# Patient Record
Sex: FEMALE | Race: WHITE | NOT HISPANIC OR LATINO | Employment: OTHER | URBAN - METROPOLITAN AREA
[De-identification: names, ages, dates, MRNs, and addresses within clinical notes are randomized per-mention and may not be internally consistent; named-entity substitution may affect disease eponyms.]

---

## 2022-10-10 ENCOUNTER — OFFICE VISIT (OUTPATIENT)
Dept: ORTHOPEDIC SURGERY | Facility: CLINIC | Age: 87
End: 2022-10-10

## 2022-10-10 VITALS — TEMPERATURE: 98.7 F | WEIGHT: 140 LBS | HEIGHT: 69 IN | BODY MASS INDEX: 20.73 KG/M2

## 2022-10-10 DIAGNOSIS — S42.291A CLOSED FRACTURE OF HEAD OF RIGHT HUMERUS, INITIAL ENCOUNTER: ICD-10-CM
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
## 2022-10-10 PROCEDURE — 99203 OFFICE O/P NEW LOW 30 MIN: CPT | Performed by: NURSE PRACTITIONER

## 2022-10-10 PROCEDURE — 73060 X-RAY EXAM OF HUMERUS: CPT | Performed by: NURSE PRACTITIONER

## 2022-10-10 NOTE — PROGRESS NOTES
Patient Name: Agueda Krishna   YOB: 1925  Referring Primary Care Physician: Angel Story MD  BMI: Body mass index is 20.67 kg/m².    Chief Complaint:    Chief Complaint   Patient presents with   • Right Upper Arm - Pain        HPI: New pt here with son with a humeral neck fracture - fell at home on 10-5-22. Pt has expressive aphasia from a stroke and lives independently . Denies hitting head.   Pt ambulates with a cane   Past history of injury to arm before 11 year ago humeral head fracture and RHD     Agueda Krishna is a 89 y.o. female who presents today for evaluation of   Chief Complaint   Patient presents with   • Right Upper Arm - Pain         Subjective   Medications:   Home Medications:  Current Outpatient Medications on File Prior to Visit   Medication Sig   • carbidopa-levodopa (SINEMET)  MG per tablet    • cholestyramine (QUESTRAN) 4 g packet DISSOLVE AND TAKE 1 PACKET BY MOUTH THREE TIMES DAILY WITH MEALS FOR CONTROL OF DIARRHEA   • levothyroxine (SYNTHROID, LEVOTHROID) 75 MCG tablet    • metoprolol tartrate (LOPRESSOR) 25 MG tablet    • rosuvastatin (CRESTOR) 40 MG tablet    • vitamin D3 125 MCG (5000 UT) capsule capsule Take 5,000 Units by mouth Daily.     No current facility-administered medications on file prior to visit.     Current Medications:  Scheduled Meds:  Continuous Infusions:No current facility-administered medications for this visit.    PRN Meds:.    I have reviewed the patient's medical history in detail and updated the computerized patient record.  Review and summarization of old records includes:    Past Medical History:   Diagnosis Date   • History of stroke 03/31/2009   • Hyperlipidemia    • Hypertension    • Hypothyroid         Past Surgical History:   Procedure Laterality Date   • CATARACT EXTRACTION Bilateral    • HYSTERECTOMY     • REPLACEMENT TOTAL KNEE Left    • TOTAL HIP ARTHROPLASTY Right         Social History     Occupational History   • Not on file  "  Tobacco Use   • Smoking status: Never   • Smokeless tobacco: Never   Substance and Sexual Activity   • Alcohol use: Yes   • Drug use: Not on file   • Sexual activity: Not on file      Social History     Social History Narrative   • Not on file      History reviewed. No pertinent family history.    ROS: 14 point review of systems was performed and all other systems were reviewed and are negative except for documented findings in HPI and today's encounter.     Allergies:   Allergies   Allergen Reactions   • Nitrofurantoin Hives   • Sulfa Antibiotics Hives     Constitutional:  Denies fever, shaking or chills   Eyes:  Denies change in visual acuity   HENT:  Denies nasal congestion or sore throat   Respiratory:  Denies cough or shortness of breath   Cardiovascular:  Denies chest pain or severe LE edema   GI:  Denies abdominal pain, nausea, vomiting, bloody stools or diarrhea   Musculoskeletal:  Numbness, tingling, pain, or loss of motor function only as noted above in history of present illness.  : Denies painful urination or hematuria  Integument:  Denies rash, lesion or ulceration   Neurologic:  Denies headache or focal weakness  Endocrine:  Denies lymphadenopathy  Psych:  Denies confusion or change in mental status   Hem:  Denies active bleeding    OBJECTIVE:  Physical Exam: 89 y.o. female  Wt Readings from Last 3 Encounters:   10/10/22 63.5 kg (140 lb)     Ht Readings from Last 1 Encounters:   10/10/22 175.3 cm (69\")     Body mass index is 20.67 kg/m².  Vitals:    10/10/22 1329   Temp: 98.7 °F (37.1 °C)     Vital signs reviewed.     General Appearance:    Alert, cooperative, in no acute distress                  Eyes: conjunctiva clear  ENT: external ears and nose atraumatic  CV: no peripheral edema  Resp: normal respiratory effort  Skin: no rashes or wounds; normal turgor  Psych: mood and affect appropriate  Lymph: no nodes appreciated  Neuro: gross sensation intact  Vascular:  Palpable peripheral pulse in noted " extremity  Musculoskeletal Extremities: skin warm dry and intact, hematoma to humeral head with no deformity, elbow nttp, +pms and nvi, capp refill   Radiology: 2 views right humerus done for pain with no comparison films show humeral head fracture       Assessment:     ICD-10-CM ICD-9-CM   1. Accidental fall, initial encounter  W19.XXXA E888.9   2. Closed fracture of head of right humerus, initial encounter  S42.291A 812.09        Procedures  Continue sling and rest, ice, elevate and     MDM/Plan:   The diagnosis(es), natural history, pathophysiology and treatment for diagnosis(es) were discussed. Opportunity given and questions answered.  Biomechanics of pertinent body areas discussed.  When appropriate, the use of ambulatory aids discussed.  EXERCISES:  Advice on benefits of, and types of regular/moderate exercise pertaining to orthopedic diagnosis(es).  MEDICATIONS:  The risks, benefits, warnings,side effects and alternatives of medications discussed.  Inflammation/pain control; with cold, heat, elevation and/or liniments discussed as appropriate  MEDICAL RECORDS reviewed from other provider(s) for past and current medical history pertinent to this complaint.      10/10/2022    Much of this encounter note is an electronic transcription/translation of spoken language to printed text. The electronic translation of spoken language may permit erroneous, or at times, nonsensical words or phrases to be inadvertently transcribed; Although I have reviewed the note for such errors, some may still exist

## 2022-10-24 ENCOUNTER — OFFICE VISIT (OUTPATIENT)
Dept: ORTHOPEDIC SURGERY | Facility: CLINIC | Age: 87
End: 2022-10-24

## 2022-10-24 VITALS — TEMPERATURE: 97.6 F | HEIGHT: 68 IN | WEIGHT: 165 LBS | BODY MASS INDEX: 25.01 KG/M2

## 2022-10-24 DIAGNOSIS — S42.201A CLOSED FRACTURE OF PROXIMAL END OF RIGHT HUMERUS, UNSPECIFIED FRACTURE MORPHOLOGY, INITIAL ENCOUNTER: Primary | ICD-10-CM

## 2022-10-24 DIAGNOSIS — Z09 FRACTURE FOLLOW-UP: ICD-10-CM

## 2022-10-24 PROCEDURE — 99214 OFFICE O/P EST MOD 30 MIN: CPT | Performed by: ORTHOPAEDIC SURGERY

## 2022-10-24 PROCEDURE — 73060 X-RAY EXAM OF HUMERUS: CPT | Performed by: ORTHOPAEDIC SURGERY

## 2022-10-24 RX ORDER — LEVOFLOXACIN 500 MG/1
TABLET, FILM COATED ORAL
COMMUNITY
Start: 2022-10-20

## 2022-10-24 NOTE — PROGRESS NOTES
Agueda Krishna : 10/20/1925 MRN: [id number] DATE: 10/24/2022    CC: Closed treatment of right proximal humerus fracture    HPI: Patient presents with her son that she had history of stroke and is nonverbal.  Right-hand-dominant.  Patient apparently had injury about 3 weeks ago and was seen several days later in ER placed in a sling.  Comes out occasionally just to move the elbow and wrist.    Vitals:    10/24/22 1338   Temp: 97.6 °F (36.4 °C)       Exam:  Skin intact and benign.  Arm and forearm are both soft.  Shoulder moves fluidly with pendulum exercises.  Good motor and sensory function distally.  Palpable radial pulse with good cap refill.      Imaginv xrays including AP and scapular Y are ordered and reviewed by me to evaluate alignment and for comparison purposes.  Evidence of proximal humerus fracture alignment satisfactory.    Impression:   2.5 weeks s/p closed treatment of right proximal humerus fracture    Plan:    1.  Continue use of sling.  2.  Begin working on pendulum exercises.  Okay to move elbow and wrist avoid stiffness do all this 3-5 times a day.  Ice as needed.  Tylenol okay for pain avoid anti-inflammatories at this time.  Recommend calcium and vitamin D for bone healing and health.  3.  Follow-up in 3 weeks with repeat 2v xrays.  4.  Counseled the patient about appropriate activity modifications and restrictions, including no driving.    Ronald Florez MD

## 2022-11-17 ENCOUNTER — OFFICE VISIT (OUTPATIENT)
Dept: ORTHOPEDIC SURGERY | Facility: CLINIC | Age: 87
End: 2022-11-17

## 2022-11-17 VITALS — WEIGHT: 165 LBS | HEIGHT: 68 IN | TEMPERATURE: 97.6 F | BODY MASS INDEX: 25.01 KG/M2

## 2022-11-17 DIAGNOSIS — R52 PAIN: ICD-10-CM

## 2022-11-17 DIAGNOSIS — S42.201D CLOSED FRACTURE OF PROXIMAL END OF RIGHT HUMERUS WITH ROUTINE HEALING, UNSPECIFIED FRACTURE MORPHOLOGY, SUBSEQUENT ENCOUNTER: Primary | ICD-10-CM

## 2022-11-17 PROCEDURE — 73030 X-RAY EXAM OF SHOULDER: CPT | Performed by: ORTHOPAEDIC SURGERY

## 2022-11-17 PROCEDURE — 99213 OFFICE O/P EST LOW 20 MIN: CPT | Performed by: ORTHOPAEDIC SURGERY

## 2022-11-17 NOTE — PROGRESS NOTES
"Agueda Krishna : 10/20/1925 MRN: [id number] DATE: 2022    CC: Closed treatment of right proximal humerus fracture    HPI: Pt. returns to clinic today for follow-up.  Reports that the pain continues to improve.  She is here with her son.  Again she is nonverbal at her baseline.  Denies any new concerns or issues.    Vitals:    22 1131   Temp: 97.6 °F (36.4 °C)   TempSrc: Temporal   Weight: 74.8 kg (165 lb)   Height: 172.7 cm (67.99\")       Exam:  Contour of shoulder appears normal.  Skin intact and benign.  Arm and forearm soft.  Shoulder motion is limited but well tolerated.  Good motor and sensory function distally.  Palpable pulse with good cap refill.      Imaging  AP and scapular Y views of the shoulder are ordered and reviewed by me to evaluate alignment and for comparison purposes.  No new or concerning findings noted.  Fracture appears to be healing.  No change in alignment.    Impression:   6 weeks s/p closed treatment of right proximal humerus fracture    Plan:    1.  Continue to work on progressive range of motion.  PT ordered.  Wean out of sling over next few weeks.  2.  F/u in 6 weeks with repeat 2v xrays.  3.  Counseled the patient about appropriate activity modifications and restrictions, including no heavy lifting, pushing or pulling  4.  Follow-up in 6 weeks.    Ronald Florez MD      "

## 2022-11-28 ENCOUNTER — TELEPHONE (OUTPATIENT)
Dept: ORTHOPEDIC SURGERY | Facility: CLINIC | Age: 87
End: 2022-11-28

## 2022-11-28 NOTE — TELEPHONE ENCOUNTER
Provider: KAMILA    Caller: JANNET FORMAN     Relationship to Patient: POA    Phone Number: [phone]    Reason for Call: TO DISCUSS REHAB FACILITY AND IF ARCELIA FORMAN  NEEDS ANOTHER APPOINTMENT WITH DR TREVIÑO    When was the patient last seen: 11/17/22

## 2023-06-15 ENCOUNTER — TRANSCRIBE ORDERS (OUTPATIENT)
Dept: ADMINISTRATIVE | Facility: HOSPITAL | Age: 88
End: 2023-06-15
Payer: MEDICARE

## 2023-06-15 DIAGNOSIS — N18.31 CHRONIC KIDNEY DISEASE (CKD) STAGE G3A/A1, MODERATELY DECREASED GLOMERULAR FILTRATION RATE (GFR) BETWEEN 45-59 ML/MIN/1.73 SQUARE METER AND ALBUMINURIA CREATININE RATIO LESS THAN 30 MG/G (CMS/H*: Primary | ICD-10-CM

## 2023-09-18 ENCOUNTER — HOSPITAL ENCOUNTER (OUTPATIENT)
Dept: ULTRASOUND IMAGING | Facility: HOSPITAL | Age: 88
Discharge: HOME | End: 2023-09-18
Admitting: INTERNAL MEDICINE
Payer: MEDICARE

## 2023-09-18 DIAGNOSIS — N18.31 CHRONIC KIDNEY DISEASE (CKD) STAGE G3A/A1, MODERATELY DECREASED GLOMERULAR FILTRATION RATE (GFR) BETWEEN 45-59 ML/MIN/1.73 SQUARE METER AND ALBUMINURIA CREATININE RATIO LESS THAN 30 MG/G (CMS/H*: ICD-10-CM

## 2023-09-18 PROCEDURE — 76775 US EXAM ABDO BACK WALL LIM: CPT

## 2025-07-01 ENCOUNTER — APPOINTMENT (OUTPATIENT)
Dept: GENERAL RADIOLOGY | Facility: HOSPITAL | Age: OVER 89
DRG: 853 | End: 2025-07-01
Payer: MEDICARE

## 2025-07-01 ENCOUNTER — ANESTHESIA (OUTPATIENT)
Dept: PERIOP | Facility: HOSPITAL | Age: OVER 89
End: 2025-07-01
Payer: MEDICARE

## 2025-07-01 ENCOUNTER — HOSPITAL ENCOUNTER (INPATIENT)
Facility: HOSPITAL | Age: OVER 89
LOS: 9 days | Discharge: NURSING, REHAB OR LONG-TERM CARE | DRG: 853 | End: 2025-07-10
Attending: EMERGENCY MEDICINE | Admitting: HOSPITALIST
Payer: MEDICARE

## 2025-07-01 ENCOUNTER — APPOINTMENT (OUTPATIENT)
Dept: CT IMAGING | Facility: HOSPITAL | Age: OVER 89
DRG: 853 | End: 2025-07-01
Payer: MEDICARE

## 2025-07-01 ENCOUNTER — ANESTHESIA EVENT (OUTPATIENT)
Dept: PERIOP | Facility: HOSPITAL | Age: OVER 89
End: 2025-07-01
Payer: MEDICARE

## 2025-07-01 DIAGNOSIS — D69.6 THROMBOCYTOPENIA: ICD-10-CM

## 2025-07-01 DIAGNOSIS — R11.10 VOMITING AND DIARRHEA: ICD-10-CM

## 2025-07-01 DIAGNOSIS — D72.829 LEUKOCYTOSIS, UNSPECIFIED TYPE: ICD-10-CM

## 2025-07-01 DIAGNOSIS — D64.9 ANEMIA, UNSPECIFIED TYPE: ICD-10-CM

## 2025-07-01 DIAGNOSIS — N20.1 LEFT URETERAL CALCULUS: Primary | ICD-10-CM

## 2025-07-01 DIAGNOSIS — E87.20 LACTIC ACIDOSIS: ICD-10-CM

## 2025-07-01 DIAGNOSIS — N17.9 ACUTE KIDNEY INJURY: ICD-10-CM

## 2025-07-01 DIAGNOSIS — R57.1 HYPOVOLEMIC SHOCK: ICD-10-CM

## 2025-07-01 DIAGNOSIS — R19.7 VOMITING AND DIARRHEA: ICD-10-CM

## 2025-07-01 LAB
ABO GROUP BLD: NORMAL
ALBUMIN SERPL-MCNC: 2.7 G/DL (ref 3.5–5.2)
ALBUMIN/GLOB SERPL: 1.1 G/DL
ALP SERPL-CCNC: 113 U/L (ref 39–117)
ALT SERPL W P-5'-P-CCNC: 44 U/L (ref 1–33)
ANION GAP SERPL CALCULATED.3IONS-SCNC: 13 MMOL/L (ref 5–15)
ANISOCYTOSIS BLD QL: ABNORMAL
ANTI-FYA: NORMAL
APTT PPP: 35.5 SECONDS (ref 22.7–35.4)
AST SERPL-CCNC: 99 U/L (ref 1–32)
BACTERIA UR QL AUTO: ABNORMAL /HPF
BASOPHILS # BLD MANUAL: 0 10*3/MM3 (ref 0–0.2)
BASOPHILS NFR BLD MANUAL: 0 % (ref 0–1.5)
BILIRUB SERPL-MCNC: 0.6 MG/DL (ref 0–1.2)
BILIRUB UR QL STRIP: NEGATIVE
BLD GP AB SCN SERPL QL: POSITIVE
BUN SERPL-MCNC: 42 MG/DL (ref 8–23)
BUN/CREAT SERPL: 14.6 (ref 7–25)
CALCIUM SPEC-SCNC: 8.2 MG/DL (ref 8.2–9.6)
CHLORIDE SERPL-SCNC: 111 MMOL/L (ref 98–107)
CLARITY UR: ABNORMAL
CO2 SERPL-SCNC: 17 MMOL/L (ref 22–29)
COLOR UR: ABNORMAL
CREAT SERPL-MCNC: 2.88 MG/DL (ref 0.57–1)
D-LACTATE SERPL-SCNC: 2.2 MMOL/L (ref 0.5–2)
D-LACTATE SERPL-SCNC: 4.4 MMOL/L (ref 0.5–2)
D-LACTATE SERPL-SCNC: 5.5 MMOL/L (ref 0.5–2)
DEPRECATED RDW RBC AUTO: 48 FL (ref 37–54)
DUFFY A ANTIGEN: NEGATIVE
EGFRCR SERPLBLD CKD-EPI 2021: 14.2 ML/MIN/1.73
ELLIPTOCYTES BLD QL SMEAR: ABNORMAL
EOSINOPHIL # BLD MANUAL: 0 10*3/MM3 (ref 0–0.4)
EOSINOPHIL NFR BLD MANUAL: 0 % (ref 0.3–6.2)
ERYTHROCYTE [DISTWIDTH] IN BLOOD BY AUTOMATED COUNT: 14.4 % (ref 12.3–15.4)
GLOBULIN UR ELPH-MCNC: 2.5 GM/DL
GLUCOSE SERPL-MCNC: 60 MG/DL (ref 65–99)
GLUCOSE UR STRIP-MCNC: NEGATIVE MG/DL
GRAN CASTS URNS QL MICRO: PRESENT /LPF
HCT VFR BLD AUTO: 30.4 % (ref 34–46.6)
HGB BLD-MCNC: 9.2 G/DL (ref 12–15.9)
HGB UR QL STRIP.AUTO: ABNORMAL
HYALINE CASTS UR QL AUTO: ABNORMAL /LPF
INR PPP: 1.72 (ref 0.9–1.1)
KETONES UR QL STRIP: NEGATIVE
LEUKOCYTE ESTERASE UR QL STRIP.AUTO: ABNORMAL
LIPASE SERPL-CCNC: 16 U/L (ref 13–60)
LYMPHOCYTES # BLD MANUAL: 0.23 10*3/MM3 (ref 0.7–3.1)
LYMPHOCYTES NFR BLD MANUAL: 4 % (ref 5–12)
MCH RBC QN AUTO: 27.8 PG (ref 26.6–33)
MCHC RBC AUTO-ENTMCNC: 30.3 G/DL (ref 31.5–35.7)
MCV RBC AUTO: 91.8 FL (ref 79–97)
MONOCYTES # BLD: 0.94 10*3/MM3 (ref 0.1–0.9)
NEUTROPHILS # BLD AUTO: 22.24 10*3/MM3 (ref 1.7–7)
NEUTROPHILS NFR BLD MANUAL: 95 % (ref 42.7–76)
NITRITE UR QL STRIP: POSITIVE
PH UR STRIP.AUTO: <=5 [PH] (ref 5–8)
PLAT MORPH BLD: NORMAL
PLATELET # BLD AUTO: 22 10*3/MM3 (ref 140–450)
PLATELET # BLD AUTO: 36 10*3/MM3 (ref 140–450)
PLATELETS.RETICULATED NFR BLD AUTO: 13.5 % (ref 0.9–6.5)
POIKILOCYTOSIS BLD QL SMEAR: ABNORMAL
POTASSIUM SERPL-SCNC: 4.9 MMOL/L (ref 3.5–5.2)
PROCALCITONIN SERPL-MCNC: 95.3 NG/ML (ref 0–0.25)
PROT SERPL-MCNC: 5.2 G/DL (ref 6–8.5)
PROT UR QL STRIP: ABNORMAL
PROTHROMBIN TIME: 20.2 SECONDS (ref 11.7–14.2)
RBC # BLD AUTO: 3.31 10*6/MM3 (ref 3.77–5.28)
RBC # UR STRIP: ABNORMAL /HPF
REF LAB TEST METHOD: ABNORMAL
RETICS # AUTO: 0.1 10*6/MM3 (ref 0.02–0.13)
RETICS/RBC NFR AUTO: 2.37 % (ref 0.7–1.9)
RH BLD: POSITIVE
SODIUM SERPL-SCNC: 141 MMOL/L (ref 136–145)
SP GR UR STRIP: 1.01 (ref 1–1.03)
SQUAMOUS #/AREA URNS HPF: ABNORMAL /HPF
T&S EXPIRATION DATE: NORMAL
UROBILINOGEN UR QL STRIP: ABNORMAL
VARIANT LYMPHS NFR BLD MANUAL: 1 % (ref 19.6–45.3)
WBC # UR STRIP: ABNORMAL /HPF
WBC CLUMPS # UR AUTO: PRESENT /HPF
WBC MORPH BLD: NORMAL
WBC NRBC COR # BLD AUTO: 23.41 10*3/MM3 (ref 3.4–10.8)

## 2025-07-01 PROCEDURE — 86905 BLOOD TYPING RBC ANTIGENS: CPT | Performed by: EMERGENCY MEDICINE

## 2025-07-01 PROCEDURE — P9612 CATHETERIZE FOR URINE SPEC: HCPCS

## 2025-07-01 PROCEDURE — C1769 GUIDE WIRE: HCPCS | Performed by: STUDENT IN AN ORGANIZED HEALTH CARE EDUCATION/TRAINING PROGRAM

## 2025-07-01 PROCEDURE — C2617 STENT, NON-COR, TEM W/O DEL: HCPCS | Performed by: STUDENT IN AN ORGANIZED HEALTH CARE EDUCATION/TRAINING PROGRAM

## 2025-07-01 PROCEDURE — 86902 BLOOD TYPE ANTIGEN DONOR EA: CPT

## 2025-07-01 PROCEDURE — 25010000002 LIDOCAINE 2% SOLUTION: Performed by: ANESTHESIOLOGY

## 2025-07-01 PROCEDURE — 25810000003 LACTATED RINGERS SOLUTION: Performed by: EMERGENCY MEDICINE

## 2025-07-01 PROCEDURE — 80053 COMPREHEN METABOLIC PANEL: CPT | Performed by: EMERGENCY MEDICINE

## 2025-07-01 PROCEDURE — 25010000002 ONDANSETRON PER 1 MG: Performed by: ANESTHESIOLOGY

## 2025-07-01 PROCEDURE — 36415 COLL VENOUS BLD VENIPUNCTURE: CPT | Performed by: EMERGENCY MEDICINE

## 2025-07-01 PROCEDURE — 83605 ASSAY OF LACTIC ACID: CPT | Performed by: EMERGENCY MEDICINE

## 2025-07-01 PROCEDURE — 85025 COMPLETE CBC W/AUTO DIFF WBC: CPT | Performed by: EMERGENCY MEDICINE

## 2025-07-01 PROCEDURE — 85007 BL SMEAR W/DIFF WBC COUNT: CPT | Performed by: EMERGENCY MEDICINE

## 2025-07-01 PROCEDURE — 0T778DZ DILATION OF LEFT URETER WITH INTRALUMINAL DEVICE, VIA NATURAL OR ARTIFICIAL OPENING ENDOSCOPIC: ICD-10-PCS | Performed by: STUDENT IN AN ORGANIZED HEALTH CARE EDUCATION/TRAINING PROGRAM

## 2025-07-01 PROCEDURE — 25810000003 LACTATED RINGERS PER 1000 ML: Performed by: EMERGENCY MEDICINE

## 2025-07-01 PROCEDURE — 84145 PROCALCITONIN (PCT): CPT | Performed by: HOSPITALIST

## 2025-07-01 PROCEDURE — 25010000002 DEXAMETHASONE SODIUM PHOSPHATE 20 MG/5ML SOLUTION: Performed by: ANESTHESIOLOGY

## 2025-07-01 PROCEDURE — 85055 RETICULATED PLATELET ASSAY: CPT | Performed by: HOSPITALIST

## 2025-07-01 PROCEDURE — 99291 CRITICAL CARE FIRST HOUR: CPT

## 2025-07-01 PROCEDURE — 83690 ASSAY OF LIPASE: CPT | Performed by: EMERGENCY MEDICINE

## 2025-07-01 PROCEDURE — 85045 AUTOMATED RETICULOCYTE COUNT: CPT | Performed by: HOSPITALIST

## 2025-07-01 PROCEDURE — 25010000002 FENTANYL CITRATE (PF) 50 MCG/ML SOLUTION: Performed by: ANESTHESIOLOGY

## 2025-07-01 PROCEDURE — 86900 BLOOD TYPING SEROLOGIC ABO: CPT | Performed by: EMERGENCY MEDICINE

## 2025-07-01 PROCEDURE — 25010000002 FAMOTIDINE 10 MG/ML SOLUTION: Performed by: ANESTHESIOLOGY

## 2025-07-01 PROCEDURE — 25510000001 IOPAMIDOL 61 % SOLUTION: Performed by: STUDENT IN AN ORGANIZED HEALTH CARE EDUCATION/TRAINING PROGRAM

## 2025-07-01 PROCEDURE — 25010000002 VASOPRESSIN 20 UNIT/ML SOLUTION: Performed by: ANESTHESIOLOGY

## 2025-07-01 PROCEDURE — 86920 COMPATIBILITY TEST SPIN: CPT

## 2025-07-01 PROCEDURE — 86922 COMPATIBILITY TEST ANTIGLOB: CPT

## 2025-07-01 PROCEDURE — 87154 CUL TYP ID BLD PTHGN 6+ TRGT: CPT | Performed by: EMERGENCY MEDICINE

## 2025-07-01 PROCEDURE — 74420 UROGRAPHY RTRGR +-KUB: CPT

## 2025-07-01 PROCEDURE — 85610 PROTHROMBIN TIME: CPT | Performed by: EMERGENCY MEDICINE

## 2025-07-01 PROCEDURE — 83540 ASSAY OF IRON: CPT | Performed by: INTERNAL MEDICINE

## 2025-07-01 PROCEDURE — 81001 URINALYSIS AUTO W/SCOPE: CPT | Performed by: EMERGENCY MEDICINE

## 2025-07-01 PROCEDURE — 82728 ASSAY OF FERRITIN: CPT | Performed by: INTERNAL MEDICINE

## 2025-07-01 PROCEDURE — 25010000002 PIPERACILLIN SOD-TAZOBACTAM PER 1 G: Performed by: EMERGENCY MEDICINE

## 2025-07-01 PROCEDURE — C1758 CATHETER, URETERAL: HCPCS | Performed by: STUDENT IN AN ORGANIZED HEALTH CARE EDUCATION/TRAINING PROGRAM

## 2025-07-01 PROCEDURE — BT1F1ZZ FLUOROSCOPY OF LEFT KIDNEY, URETER AND BLADDER USING LOW OSMOLAR CONTRAST: ICD-10-PCS | Performed by: STUDENT IN AN ORGANIZED HEALTH CARE EDUCATION/TRAINING PROGRAM

## 2025-07-01 PROCEDURE — 25010000002 PROPOFOL 10 MG/ML EMULSION: Performed by: ANESTHESIOLOGY

## 2025-07-01 PROCEDURE — 86870 RBC ANTIBODY IDENTIFICATION: CPT | Performed by: EMERGENCY MEDICINE

## 2025-07-01 PROCEDURE — 86901 BLOOD TYPING SEROLOGIC RH(D): CPT | Performed by: EMERGENCY MEDICINE

## 2025-07-01 PROCEDURE — 87186 SC STD MICRODIL/AGAR DIL: CPT | Performed by: EMERGENCY MEDICINE

## 2025-07-01 PROCEDURE — 25810000003 SODIUM CHLORIDE 0.9 % SOLUTION: Performed by: ANESTHESIOLOGY

## 2025-07-01 PROCEDURE — 85730 THROMBOPLASTIN TIME PARTIAL: CPT | Performed by: EMERGENCY MEDICINE

## 2025-07-01 PROCEDURE — 74176 CT ABD & PELVIS W/O CONTRAST: CPT

## 2025-07-01 PROCEDURE — 84466 ASSAY OF TRANSFERRIN: CPT | Performed by: INTERNAL MEDICINE

## 2025-07-01 PROCEDURE — 87040 BLOOD CULTURE FOR BACTERIA: CPT | Performed by: EMERGENCY MEDICINE

## 2025-07-01 PROCEDURE — 86850 RBC ANTIBODY SCREEN: CPT | Performed by: EMERGENCY MEDICINE

## 2025-07-01 PROCEDURE — 25010000002 DROPERIDOL PER 5 MG: Performed by: ANESTHESIOLOGY

## 2025-07-01 DEVICE — URETERAL STENT
Type: IMPLANTABLE DEVICE | Site: URETER | Status: FUNCTIONAL
Brand: CONTOUR™

## 2025-07-01 RX ORDER — DEXAMETHASONE SODIUM PHOSPHATE 4 MG/ML
INJECTION, SOLUTION INTRA-ARTICULAR; INTRALESIONAL; INTRAMUSCULAR; INTRAVENOUS; SOFT TISSUE AS NEEDED
Status: DISCONTINUED | OUTPATIENT
Start: 2025-07-01 | End: 2025-07-01 | Stop reason: SURG

## 2025-07-01 RX ORDER — NITROGLYCERIN 0.4 MG/1
0.4 TABLET SUBLINGUAL
Status: DISCONTINUED | OUTPATIENT
Start: 2025-07-01 | End: 2025-07-10 | Stop reason: HOSPADM

## 2025-07-01 RX ORDER — SODIUM CHLORIDE 0.9 % (FLUSH) 0.9 %
10 SYRINGE (ML) INJECTION AS NEEDED
Status: DISCONTINUED | OUTPATIENT
Start: 2025-07-01 | End: 2025-07-10 | Stop reason: HOSPADM

## 2025-07-01 RX ORDER — SODIUM CHLORIDE 9 MG/ML
9 INJECTION, SOLUTION INTRAVENOUS ONCE
Status: COMPLETED | OUTPATIENT
Start: 2025-07-01 | End: 2025-07-01

## 2025-07-01 RX ORDER — ACETAMINOPHEN 325 MG/1
650 TABLET ORAL EVERY 4 HOURS PRN
Status: DISCONTINUED | OUTPATIENT
Start: 2025-07-01 | End: 2025-07-10 | Stop reason: HOSPADM

## 2025-07-01 RX ORDER — SODIUM CHLORIDE 0.9 % (FLUSH) 0.9 %
10 SYRINGE (ML) INJECTION EVERY 12 HOURS SCHEDULED
Status: DISCONTINUED | OUTPATIENT
Start: 2025-07-01 | End: 2025-07-10 | Stop reason: HOSPADM

## 2025-07-01 RX ORDER — ATROPINE SULFATE 0.4 MG/ML
0.4 INJECTION, SOLUTION INTRAMUSCULAR; INTRAVENOUS; SUBCUTANEOUS ONCE AS NEEDED
Status: DISCONTINUED | OUTPATIENT
Start: 2025-07-01 | End: 2025-07-01 | Stop reason: HOSPADM

## 2025-07-01 RX ORDER — ACETAMINOPHEN 650 MG/1
650 SUPPOSITORY RECTAL EVERY 4 HOURS PRN
Status: DISCONTINUED | OUTPATIENT
Start: 2025-07-01 | End: 2025-07-10 | Stop reason: HOSPADM

## 2025-07-01 RX ORDER — PROPOFOL 10 MG/ML
VIAL (ML) INTRAVENOUS AS NEEDED
Status: DISCONTINUED | OUTPATIENT
Start: 2025-07-01 | End: 2025-07-01 | Stop reason: SURG

## 2025-07-01 RX ORDER — SODIUM CHLORIDE 9 MG/ML
40 INJECTION, SOLUTION INTRAVENOUS AS NEEDED
Status: DISCONTINUED | OUTPATIENT
Start: 2025-07-01 | End: 2025-07-10 | Stop reason: HOSPADM

## 2025-07-01 RX ORDER — SODIUM CHLORIDE 9 MG/ML
125 INJECTION, SOLUTION INTRAVENOUS CONTINUOUS
Status: DISCONTINUED | OUTPATIENT
Start: 2025-07-01 | End: 2025-07-01

## 2025-07-01 RX ORDER — EPHEDRINE SULFATE 50 MG/ML
5 INJECTION, SOLUTION INTRAVENOUS ONCE AS NEEDED
Status: DISCONTINUED | OUTPATIENT
Start: 2025-07-01 | End: 2025-07-01 | Stop reason: HOSPADM

## 2025-07-01 RX ORDER — IPRATROPIUM BROMIDE AND ALBUTEROL SULFATE 2.5; .5 MG/3ML; MG/3ML
3 SOLUTION RESPIRATORY (INHALATION) ONCE AS NEEDED
Status: DISCONTINUED | OUTPATIENT
Start: 2025-07-01 | End: 2025-07-01 | Stop reason: HOSPADM

## 2025-07-01 RX ORDER — SODIUM CHLORIDE, SODIUM LACTATE, POTASSIUM CHLORIDE, CALCIUM CHLORIDE 600; 310; 30; 20 MG/100ML; MG/100ML; MG/100ML; MG/100ML
9 INJECTION, SOLUTION INTRAVENOUS CONTINUOUS
Status: ACTIVE | OUTPATIENT
Start: 2025-07-01 | End: 2025-07-02

## 2025-07-01 RX ORDER — ACETAMINOPHEN 160 MG/5ML
650 SOLUTION ORAL EVERY 4 HOURS PRN
Status: DISCONTINUED | OUTPATIENT
Start: 2025-07-01 | End: 2025-07-10 | Stop reason: HOSPADM

## 2025-07-01 RX ORDER — SODIUM CHLORIDE, SODIUM LACTATE, POTASSIUM CHLORIDE, CALCIUM CHLORIDE 600; 310; 30; 20 MG/100ML; MG/100ML; MG/100ML; MG/100ML
125 INJECTION, SOLUTION INTRAVENOUS CONTINUOUS
Status: ACTIVE | OUTPATIENT
Start: 2025-07-01 | End: 2025-07-01

## 2025-07-01 RX ORDER — CHOLECALCIFEROL (VITAMIN D3) 25 MCG
5000 TABLET ORAL DAILY
Status: DISCONTINUED | OUTPATIENT
Start: 2025-07-01 | End: 2025-07-10 | Stop reason: HOSPADM

## 2025-07-01 RX ORDER — SODIUM CHLORIDE 0.9 % (FLUSH) 0.9 %
3 SYRINGE (ML) INJECTION EVERY 12 HOURS SCHEDULED
Status: DISCONTINUED | OUTPATIENT
Start: 2025-07-01 | End: 2025-07-01 | Stop reason: HOSPADM

## 2025-07-01 RX ORDER — LEVOTHYROXINE SODIUM 75 UG/1
75 TABLET ORAL
Status: DISCONTINUED | OUTPATIENT
Start: 2025-07-02 | End: 2025-07-10 | Stop reason: HOSPADM

## 2025-07-01 RX ORDER — SOLIFENACIN SUCCINATE 5 MG/1
5 TABLET, FILM COATED ORAL DAILY
COMMUNITY
End: 2025-07-10 | Stop reason: HOSPADM

## 2025-07-01 RX ORDER — BUPIVACAINE HCL/0.9 % NACL/PF 0.125 %
PLASTIC BAG, INJECTION (ML) EPIDURAL AS NEEDED
Status: DISCONTINUED | OUTPATIENT
Start: 2025-07-01 | End: 2025-07-01 | Stop reason: SURG

## 2025-07-01 RX ORDER — CARBIDOPA AND LEVODOPA 25; 100 MG/1; MG/1
1 TABLET ORAL 2 TIMES DAILY
Status: DISCONTINUED | OUTPATIENT
Start: 2025-07-01 | End: 2025-07-10 | Stop reason: HOSPADM

## 2025-07-01 RX ORDER — INDOMETHACIN 25 MG/1
CAPSULE ORAL AS NEEDED
Status: DISCONTINUED | OUTPATIENT
Start: 2025-07-01 | End: 2025-07-01 | Stop reason: SURG

## 2025-07-01 RX ORDER — ONDANSETRON 4 MG/1
4 TABLET, ORALLY DISINTEGRATING ORAL EVERY 6 HOURS PRN
Status: DISCONTINUED | OUTPATIENT
Start: 2025-07-01 | End: 2025-07-10 | Stop reason: HOSPADM

## 2025-07-01 RX ORDER — FAMOTIDINE 10 MG/ML
20 INJECTION, SOLUTION INTRAVENOUS ONCE
Status: COMPLETED | OUTPATIENT
Start: 2025-07-01 | End: 2025-07-01

## 2025-07-01 RX ORDER — ONDANSETRON 2 MG/ML
INJECTION INTRAMUSCULAR; INTRAVENOUS AS NEEDED
Status: DISCONTINUED | OUTPATIENT
Start: 2025-07-01 | End: 2025-07-01 | Stop reason: SURG

## 2025-07-01 RX ORDER — IOPAMIDOL 612 MG/ML
INJECTION, SOLUTION INTRAVASCULAR AS NEEDED
Status: DISCONTINUED | OUTPATIENT
Start: 2025-07-01 | End: 2025-07-01 | Stop reason: HOSPADM

## 2025-07-01 RX ORDER — LIDOCAINE HYDROCHLORIDE 20 MG/ML
INJECTION, SOLUTION INFILTRATION; PERINEURAL AS NEEDED
Status: DISCONTINUED | OUTPATIENT
Start: 2025-07-01 | End: 2025-07-01 | Stop reason: SURG

## 2025-07-01 RX ORDER — VASOPRESSIN 20 [USP'U]/ML
INJECTION, SOLUTION INTRAVENOUS AS NEEDED
Status: DISCONTINUED | OUTPATIENT
Start: 2025-07-01 | End: 2025-07-01 | Stop reason: SURG

## 2025-07-01 RX ORDER — ONDANSETRON 2 MG/ML
4 INJECTION INTRAMUSCULAR; INTRAVENOUS ONCE AS NEEDED
Status: DISCONTINUED | OUTPATIENT
Start: 2025-07-01 | End: 2025-07-01 | Stop reason: HOSPADM

## 2025-07-01 RX ORDER — DROPERIDOL 2.5 MG/ML
0.62 INJECTION, SOLUTION INTRAMUSCULAR; INTRAVENOUS
Status: DISCONTINUED | OUTPATIENT
Start: 2025-07-01 | End: 2025-07-01 | Stop reason: HOSPADM

## 2025-07-01 RX ORDER — METOPROLOL TARTRATE 25 MG/1
12.5 TABLET, FILM COATED ORAL 2 TIMES DAILY
Status: DISCONTINUED | OUTPATIENT
Start: 2025-07-01 | End: 2025-07-10 | Stop reason: HOSPADM

## 2025-07-01 RX ORDER — DEXTROSE MONOHYDRATE 25 G/50ML
25 INJECTION, SOLUTION INTRAVENOUS ONCE
Status: COMPLETED | OUTPATIENT
Start: 2025-07-01 | End: 2025-07-01

## 2025-07-01 RX ORDER — PROMETHAZINE HYDROCHLORIDE 25 MG/1
25 SUPPOSITORY RECTAL ONCE AS NEEDED
Status: DISCONTINUED | OUTPATIENT
Start: 2025-07-01 | End: 2025-07-01 | Stop reason: HOSPADM

## 2025-07-01 RX ORDER — FENTANYL CITRATE 50 UG/ML
50 INJECTION, SOLUTION INTRAMUSCULAR; INTRAVENOUS ONCE AS NEEDED
Status: DISCONTINUED | OUTPATIENT
Start: 2025-07-01 | End: 2025-07-01 | Stop reason: HOSPADM

## 2025-07-01 RX ORDER — SODIUM CHLORIDE 0.9 % (FLUSH) 0.9 %
3-10 SYRINGE (ML) INJECTION AS NEEDED
Status: DISCONTINUED | OUTPATIENT
Start: 2025-07-01 | End: 2025-07-01 | Stop reason: HOSPADM

## 2025-07-01 RX ORDER — LIDOCAINE HYDROCHLORIDE 10 MG/ML
0.5 INJECTION, SOLUTION INFILTRATION; PERINEURAL ONCE AS NEEDED
Status: DISCONTINUED | OUTPATIENT
Start: 2025-07-01 | End: 2025-07-01 | Stop reason: HOSPADM

## 2025-07-01 RX ORDER — MAGNESIUM HYDROXIDE 1200 MG/15ML
LIQUID ORAL AS NEEDED
Status: DISCONTINUED | OUTPATIENT
Start: 2025-07-01 | End: 2025-07-01 | Stop reason: HOSPADM

## 2025-07-01 RX ORDER — DIPHENHYDRAMINE HYDROCHLORIDE 50 MG/ML
12.5 INJECTION, SOLUTION INTRAMUSCULAR; INTRAVENOUS
Status: DISCONTINUED | OUTPATIENT
Start: 2025-07-01 | End: 2025-07-01 | Stop reason: HOSPADM

## 2025-07-01 RX ORDER — AMOXICILLIN 500 MG/1
1 CAPSULE ORAL EVERY 12 HOURS SCHEDULED
COMMUNITY
Start: 2025-06-09 | End: 2025-07-01

## 2025-07-01 RX ORDER — HYDRALAZINE HYDROCHLORIDE 20 MG/ML
5 INJECTION INTRAMUSCULAR; INTRAVENOUS
Status: DISCONTINUED | OUTPATIENT
Start: 2025-07-01 | End: 2025-07-01 | Stop reason: HOSPADM

## 2025-07-01 RX ORDER — PROMETHAZINE HYDROCHLORIDE 25 MG/1
25 TABLET ORAL ONCE AS NEEDED
Status: DISCONTINUED | OUTPATIENT
Start: 2025-07-01 | End: 2025-07-01 | Stop reason: HOSPADM

## 2025-07-01 RX ORDER — ONDANSETRON 2 MG/ML
4 INJECTION INTRAMUSCULAR; INTRAVENOUS EVERY 6 HOURS PRN
Status: DISCONTINUED | OUTPATIENT
Start: 2025-07-01 | End: 2025-07-10 | Stop reason: HOSPADM

## 2025-07-01 RX ORDER — FENTANYL CITRATE 50 UG/ML
25 INJECTION, SOLUTION INTRAMUSCULAR; INTRAVENOUS
Status: DISCONTINUED | OUTPATIENT
Start: 2025-07-01 | End: 2025-07-01 | Stop reason: HOSPADM

## 2025-07-01 RX ORDER — NALOXONE HCL 0.4 MG/ML
0.2 VIAL (ML) INJECTION AS NEEDED
Status: DISCONTINUED | OUTPATIENT
Start: 2025-07-01 | End: 2025-07-01 | Stop reason: HOSPADM

## 2025-07-01 RX ORDER — FLUMAZENIL 0.1 MG/ML
0.2 INJECTION INTRAVENOUS AS NEEDED
Status: DISCONTINUED | OUTPATIENT
Start: 2025-07-01 | End: 2025-07-01 | Stop reason: HOSPADM

## 2025-07-01 RX ORDER — LABETALOL HYDROCHLORIDE 5 MG/ML
5 INJECTION, SOLUTION INTRAVENOUS
Status: DISCONTINUED | OUTPATIENT
Start: 2025-07-01 | End: 2025-07-01 | Stop reason: HOSPADM

## 2025-07-01 RX ADMIN — SODIUM CHLORIDE 9 ML/HR: 9 INJECTION, SOLUTION INTRAVENOUS at 17:28

## 2025-07-01 RX ADMIN — DEXAMETHASONE SODIUM PHOSPHATE 4 MG: 4 INJECTION, SOLUTION INTRAMUSCULAR; INTRAVENOUS at 17:55

## 2025-07-01 RX ADMIN — Medication 200 MCG: at 17:56

## 2025-07-01 RX ADMIN — PIPERACILLIN AND TAZOBACTAM 3.38 G: 3; .375 INJECTION, POWDER, FOR SOLUTION INTRAVENOUS at 14:28

## 2025-07-01 RX ADMIN — SODIUM BICARBONATE 50 MEQ: 84 INJECTION INTRAVENOUS at 17:42

## 2025-07-01 RX ADMIN — SODIUM CHLORIDE, POTASSIUM CHLORIDE, SODIUM LACTATE AND CALCIUM CHLORIDE 1000 ML: 600; 310; 30; 20 INJECTION, SOLUTION INTRAVENOUS at 12:31

## 2025-07-01 RX ADMIN — PROPOFOL 50 MG: 10 INJECTION, EMULSION INTRAVENOUS at 17:45

## 2025-07-01 RX ADMIN — Medication 200 MCG: at 18:06

## 2025-07-01 RX ADMIN — FAMOTIDINE 20 MG: 10 INJECTION INTRAVENOUS at 17:28

## 2025-07-01 RX ADMIN — SODIUM CHLORIDE, POTASSIUM CHLORIDE, SODIUM LACTATE AND CALCIUM CHLORIDE 1109 ML: 600; 310; 30; 20 INJECTION, SOLUTION INTRAVENOUS at 13:35

## 2025-07-01 RX ADMIN — ONDANSETRON 4 MG: 2 INJECTION, SOLUTION INTRAMUSCULAR; INTRAVENOUS at 18:05

## 2025-07-01 RX ADMIN — LIDOCAINE HYDROCHLORIDE 50 MG: 20 INJECTION, SOLUTION INFILTRATION; PERINEURAL at 17:45

## 2025-07-01 RX ADMIN — DEXTROSE MONOHYDRATE 25 ML: 25 INJECTION, SOLUTION INTRAVENOUS at 13:32

## 2025-07-01 RX ADMIN — DROPERIDOL 0.62 MG: 2.5 INJECTION, SOLUTION INTRAMUSCULAR; INTRAVENOUS at 19:31

## 2025-07-01 RX ADMIN — FENTANYL CITRATE 25 MCG: 50 INJECTION, SOLUTION INTRAMUSCULAR; INTRAVENOUS at 18:40

## 2025-07-01 RX ADMIN — FENTANYL CITRATE 25 MCG: 50 INJECTION, SOLUTION INTRAMUSCULAR; INTRAVENOUS at 18:35

## 2025-07-01 RX ADMIN — VASOPRESSIN 2 UNITS: 20 INJECTION INTRAVENOUS at 17:44

## 2025-07-01 RX ADMIN — Medication 10 ML: at 21:47

## 2025-07-01 RX ADMIN — SODIUM CHLORIDE, POTASSIUM CHLORIDE, SODIUM LACTATE AND CALCIUM CHLORIDE 125 ML/HR: 600; 310; 30; 20 INJECTION, SOLUTION INTRAVENOUS at 15:16

## 2025-07-01 NOTE — PROGRESS NOTES
Clinical Pharmacy Services: Medication History    Agueda Krishna is a 89 y.o. female presenting to [facility] for   Chief Complaint   Patient presents with    Vomiting       She  has a past medical history of History of stroke (03/31/2009), Hyperlipidemia, Hypertension, and Hypothyroid.    Allergies as of 07/01/2025 - Reviewed 07/01/2025   Allergen Reaction Noted    Nitrofurantoin Hives 09/30/2020    Sulfa antibiotics Hives 09/04/2014       Medication information was obtained from: Family Members   Pharmacy and Phone Number:     Prior to Admission Medications       Prescriptions Last Dose Informant Patient Reported? Taking?    carbidopa-levodopa (SINEMET)  MG per tablet  Pharmacy Yes Yes    Take 1 tablet by mouth 2 (Two) Times a Day.    levothyroxine (SYNTHROID, LEVOTHROID) 75 MCG tablet  Pharmacy Yes Yes    Take 1 tablet by mouth Every Morning.    metoprolol tartrate (LOPRESSOR) 25 MG tablet  Pharmacy Yes Yes    Take 0.5 tablets by mouth 2 (Two) Times a Day.    rosuvastatin (CRESTOR) 40 MG tablet  Pharmacy Yes Yes    Take 1 tablet by mouth Daily.    solifenacin (VESICARE) 5 MG tablet  Family Member Yes Yes    Take 1 tablet by mouth Daily.    vitamin D3 125 MCG (5000 UT) capsule capsule  Family Member Yes Yes    Take 1 capsule by mouth Daily.    cholestyramine (QUESTRAN) 4 g packet Not Taking  Yes No    DISSOLVE AND TAKE 1 PACKET BY MOUTH THREE TIMES DAILY WITH MEALS FOR CONTROL OF DIARRHEA    Patient not taking:  Reported on 7/1/2025    levoFLOXacin (LEVAQUIN) 500 MG tablet Not Taking  Yes No    TAKE 1 TABLET BY MOUTH ONCE DAILY FOR 10 DAYS    Patient not taking:  Reported on 7/1/2025              Medication notes:     This medication list is complete to the best of my knowledge as of 7/1/2025    Please call if questions.    Harjit Knapp  Medication History Technician  [phone]    7/1/2025 13:58 EDT

## 2025-07-01 NOTE — ANESTHESIA POSTPROCEDURE EVALUATION
Patient: Agueda Krishna    Procedure Summary       Date: 07/01/25 Room / Location: [facility] OR 01 / [facility] MAIN OR    Anesthesia Start: 1739 Anesthesia Stop: 1823    Procedure: CYSTOSCOPY, RETROGRADE PYELOGRAM, AND LEFT STENT INSERTION (Left) Diagnosis:       Acute kidney injury      Left ureteral calculus      (Acute kidney injury [N17.9])      (Left ureteral calculus [N20.1])    Surgeons: Joel Beebe MD Provider: Miguel Lutz MD    Anesthesia Type: general ASA Status: 4 - Emergent            Anesthesia Type: general    Vitals  Vitals Value Taken Time   /84 07/01/25 18:45   Temp 36.4 °C (97.6 °F) 07/01/25 18:22   Pulse 68 07/01/25 19:51   Resp 20 07/01/25 18:45   SpO2 96 % 07/01/25 19:51   Vitals shown include unfiled device data.        Anesthesia Post Evaluation

## 2025-07-01 NOTE — ED PROVIDER NOTES
EMERGENCY DEPARTMENT ENCOUNTER  Room Number:  30/30  PCP: Angel Stroy MD  Independent Historians: Patient, sons      HPI:  Chief Complaint: had concerns including Vomiting.  Diarrhea    A complete HPI/ROS/PMH/PSH/SH/FH are unobtainable due to: Weakness      Context: Agueda Krishna is a 89 y.o. female with a medical history of stroke, hyperlipidemia, hypertension who presents to the ED c/o acute weakness.  Family states that she developed vomiting and diarrhea yesterday.  She has not vomited today but she has continued to have diarrhea.  No known fever.  No black or bloody stool.  No reported hematemesis.  Patient lives alone and is typically ambulatory.      Review of prior external notes (non-ED) -and- Review of prior external test results outside of this encounter: I reviewed family medicine visit from 6/4/2025.  Patient seen in follow-up for stage III CKD, vitamin D deficiency.        PAST MEDICAL HISTORY  Active Ambulatory Problems     Diagnosis Date Noted    No Active Ambulatory Problems     Resolved Ambulatory Problems     Diagnosis Date Noted    No Resolved Ambulatory Problems     Past Medical History:   Diagnosis Date    History of stroke 03/31/2009    Hyperlipidemia     Hypertension     Hypothyroid          PAST SURGICAL HISTORY  Past Surgical History:   Procedure Laterality Date    CATARACT EXTRACTION Bilateral     HYSTERECTOMY      REPLACEMENT TOTAL KNEE Left     TOTAL HIP ARTHROPLASTY Right          FAMILY HISTORY  History reviewed. No pertinent family history.      SOCIAL HISTORY  Social History     Socioeconomic History    Marital status:    Tobacco Use    Smoking status: Never    Smokeless tobacco: Never   Substance and Sexual Activity    Alcohol use: Yes         ALLERGIES  Nitrofurantoin and Sulfa antibiotics      REVIEW OF SYSTEMS  Review of all 14 systems is negative other than stated in the HPI above.      PHYSICAL EXAM    I have reviewed the triage vital signs and nursing  notes.    ED Triage Vitals   Temp Heart Rate Resp BP SpO2   07/01/25 1207 07/01/25 1206 07/01/25 1206 07/01/25 1209 07/01/25 1206   98 °F (36.7 °C) 79 16 (!) 82/47 98 %      Temp src Heart Rate Source Patient Position BP Location FiO2 (%)   07/01/25 1207 07/01/25 1206 07/01/25 1209 07/01/25 1209 --   Oral Monitor Sitting Right arm          GENERAL: awake and alert, patient appears generally weak, slow to respond to questions  HENT: Normocephalic, atraumatic  EYES: no scleral icterus  CV: regular rhythm, regular rate  RESPIRATORY: normal effort  ABDOMEN: soft, nondistended, nontender throughout  MUSCULOSKELETAL: no deformity  NEURO: alert, moves all extremities, follows commands  PSYCH: calm, cooperative  SKIN: Warm, dry          LAB RESULTS  Recent Results (from the past 24 hours)   Comprehensive Metabolic Panel    Collection Time: 07/01/25 12:30 PM    Specimen: Blood   Result Value Ref Range    Glucose 60 (L) 65 - 99 mg/dL    BUN 42.0 (H) 8.0 - 23.0 mg/dL    Creatinine 2.88 (H) 0.57 - 1.00 mg/dL    Sodium 141 136 - 145 mmol/L    Potassium 4.9 3.5 - 5.2 mmol/L    Chloride 111 (H) 98 - 107 mmol/L    CO2 17.0 (L) 22.0 - 29.0 mmol/L    Calcium 8.2 8.2 - 9.6 mg/dL    Total Protein 5.2 (L) 6.0 - 8.5 g/dL    Albumin 2.7 (L) 3.5 - 5.2 g/dL    ALT (SGPT) 44 (H) 1 - 33 U/L    AST (SGOT) 99 (H) 1 - 32 U/L    Alkaline Phosphatase 113 39 - 117 U/L    Total Bilirubin 0.6 0.0 - 1.2 mg/dL    Globulin 2.5 gm/dL    A/G Ratio 1.1 g/dL    BUN/Creatinine Ratio 14.6 7.0 - 25.0    Anion Gap 13.0 5.0 - 15.0 mmol/L    eGFR 14.2 (L) >60.0 mL/min/1.73   Lipase    Collection Time: 07/01/25 12:30 PM    Specimen: Blood   Result Value Ref Range    Lipase 16 13 - 60 U/L   Lactic Acid, Plasma    Collection Time: 07/01/25 12:30 PM    Specimen: Blood   Result Value Ref Range    Lactate 4.4 (C) 0.5 - 2.0 mmol/L   CBC Auto Differential    Collection Time: 07/01/25 12:30 PM    Specimen: Blood   Result Value Ref Range    WBC 23.41 (H) 3.40 - 10.80  10*3/mm3    RBC 3.31 (L) 3.77 - 5.28 10*6/mm3    Hemoglobin 9.2 (L) 12.0 - 15.9 g/dL    Hematocrit 30.4 (L) 34.0 - 46.6 %    MCV 91.8 79.0 - 97.0 fL    MCH 27.8 26.6 - 33.0 pg    MCHC 30.3 (L) 31.5 - 35.7 g/dL    RDW 14.4 12.3 - 15.4 %    RDW-SD 48.0 37.0 - 54.0 fl    Platelets 36 (C) 140 - 450 10*3/mm3   Manual Differential    Collection Time: 07/01/25 12:30 PM    Specimen: Blood   Result Value Ref Range    Neutrophil % 95.0 (H) 42.7 - 76.0 %    Lymphocyte % 1.0 (L) 19.6 - 45.3 %    Monocyte % 4.0 (L) 5.0 - 12.0 %    Eosinophil % 0.0 (L) 0.3 - 6.2 %    Basophil % 0.0 0.0 - 1.5 %    Neutrophils Absolute 22.24 (H) 1.70 - 7.00 10*3/mm3    Lymphocytes Absolute 0.23 (L) 0.70 - 3.10 10*3/mm3    Monocytes Absolute 0.94 (H) 0.10 - 0.90 10*3/mm3    Eosinophils Absolute 0.00 0.00 - 0.40 10*3/mm3    Basophils Absolute 0.00 0.00 - 0.20 10*3/mm3    Anisocytosis Mod/2+ None Seen    Elliptocytes Slight/1+ None Seen    Poikilocytes Mod/2+ None Seen    WBC Morphology Normal Normal    Platelet Morphology Normal Normal   aPTT    Collection Time: 07/01/25  1:24 PM    Specimen: Blood   Result Value Ref Range    PTT 35.5 (H) 22.7 - 35.4 seconds   Protime-INR    Collection Time: 07/01/25  1:24 PM    Specimen: Blood   Result Value Ref Range    Protime 20.2 (H) 11.7 - 14.2 Seconds    INR 1.72 (H) 0.90 - 1.10   Type & Screen    Collection Time: 07/01/25  1:24 PM    Specimen: Blood   Result Value Ref Range    ABO Type A     RH type Positive     Antibody Screen Positive     T&S Expiration Date 7/4/2025 11:59:59 PM        The above labs were ordered by me and independently reviewed by me.     RADIOLOGY  CT Abdomen Pelvis Without Contrast  Result Date: 7/1/2025  CT ABDOMEN PELVIS WO CONTRAST-  DATE OF EXAM: 7/1/2025 1:41 PM  INDICATION: vomiting, diarrhea, WBC 23, hypotensive.  COMPARISON: Prior renal ultrasound 9/18/2023.  TECHNIQUE: Multiple contiguous axial images were acquired through the abdomen and pelvis without the intravenous  administration of contrast. Reformatted coronal and sagittal sequences were also reviewed. Radiation dose reduction techniques were utilized, including automated exposure control and exposure modulation based on body size.  FINDINGS: Motion artifact limits evaluation.  Partially imaged small left pleural effusion and trace right pleural fluid with multifocal bibasilar subsegmental atelectasis and/or scarring. Patchy dependent groundglass opacities in the base of each lower lobe could reflect superimposed pneumonia. Bilateral lower lobe bronchial plugging. Partially imaged severe calcified coronary artery disease and aortic valve calcifications. Trace pericardial fluid.  Status post cholecystectomy. Mildly heterogeneous hepatic attenuation with subtle nodular liver contours, which could reflect underlying hepatocellular disease/cirrhosis. Recanalization of the periumbilical vein. Mild splenomegaly with the spleen measuring 14.5 cm in diameter. The pancreas and adrenal glands are unremarkable in limited noncontrast CT appearance. Incompletely evaluated 1.5 cm slightly high attenuation exophytic lesion at the upper pole of the right kidney, probable benign hemorrhagic or proteinaceous cyst. Large 7 mm x 21 mm stone in the left renal pelvis with mild left pelviectasis and mild left peripelvic and perinephric fat stranding, likely intermittently obstructing. Additional nonobstructing 3 to 4 mm stones in the left kidney. No ureteral stone is identified. The urinary bladder is nondistended. Mild urinary bladder wall thickening and patchy perivesical fat stranding, which could be accentuated by under distention. Status post hysterectomy. The adnexa are not definitively identified.  Mild diffuse gastric wall thickening could be accentuated by under distention but could also reflect a nonspecific gastritis. Short segment of distal ileum in the upper right hemipelvis with mild bowel wall thickening suggesting a nonspecific  segmental enteritis. Mild colorectal stool. Extensive colonic diverticula, without specific CT evidence for acute diverticulitis. No bowel obstruction. The appendix is normal.  Trace perihepatic free fluid and trace free fluid in the pelvis. No free intraperitoneal air. No pathologically enlarged lymph nodes in the abdomen or pelvis. Moderate to severe calcified atherosclerotic disease in the abdominal aorta and its branches without aneurysm.  Mild diffuse anasarca. Diffuse osteopenia. Mild to moderate multilevel lumbar spondylosis with mild likely chronic/degenerative grade 1 anterolisthesis of L3 on L4 and L4 on L5. Partially imaged right MARQUEZ with associated hardware artifact. No evidence of hardware complications. Mild to moderate left hip joint DJD. No acute osseous abnormality or concerning osseous lesion. The upper extremities are partially included in the field-of-view but are not adequately evaluated.       1. Large 7 mm x 21 mm stone in the left renal pelvis with mild left pelviectasis and mild left peripelvic and perinephric fat stranding, suggesting possible intermediate obstruction and possible urinary tract infection. Additional nonobstructing stones in the left kidney. 2. Urinary bladder wall thickening and mild perivesical fat stranding, which could be accentuated by underdistention but could reflect a nonspecific cystitis. Recommend correlation with urinalysis. 3. Mild diffuse gastric wall thickening could be accentuated by underdistention but could reflect a nonspecific gastritis. 4. Short segment of distal ileal wall thickening, suggesting a nonspecific segmental enteritis. 5. Mildly heterogeneous hepatic attenuation with subtle nodular liver contours, which could reflect underlying hepatocellular disease/cirrhosis, with evidence of portal venous hypertension including splenomegaly, recanalization of the periumbilical vein, and trace ascites. 6. Partially imaged small left pleural effusion and  trace right pleural fluid with multifocal bibasilar subsegmental atelectasis and/are scarring and patchy groundglass opacities in the dependent base of each lower lobe but could reflect superimposed pneumonia.  This report was finalized on 7/1/2025 2:53 PM by Rodrigo Alejandre MD on Workstation: [id number]        The above radiology studies were ordered by me.  See ED course for independent interpretations.     MEDICATIONS GIVEN IN ER  Medications   sodium chloride 0.9 % flush 10 mL (has no administration in time range)   lactated ringers infusion (has no administration in time range)   lactated ringers bolus 1,000 mL (0 mL Intravenous Stopped 7/1/25 1235)   piperacillin-tazobactam (ZOSYN) 3.375 g IVPB in 100 mL NS MBP (CD) (3.375 g Intravenous New Bag 7/1/25 1428)   lactated ringers bolus 2,109 mL (0 mL Intravenous Stopped 7/1/25 1506)   dextrose (D50W) (25 g/50 mL) IV injection 25 mL (25 mL Intravenous Given 7/1/25 1332)         ORDERS PLACED DURING THIS VISIT:  Orders Placed This Encounter   Procedures    Blood Culture - Blood,    Blood Culture - Blood,    CT Abdomen Pelvis Without Contrast    Comprehensive Metabolic Panel    Lipase    Lactic Acid, Plasma    CBC Auto Differential    Manual Differential    STAT Lactic Acid, Reflex    aPTT    Protime-INR    Urinalysis With Microscopic If Indicated (No Culture) - Urine, Catheter    Continuous Pulse Oximetry    Monitor Blood Pressure    Code Status and Medical Interventions: No CPR (Do Not Attempt to Resuscitate); Limited Support; No intubation (DNI)    [facility] (on-call MD unless specified) Details    Urology (on-call MD unless specified)    Palliative Care Nurse Consult    Type & Screen    Insert Peripheral IV    Inpatient Admission    CBC & Differential         OUTPATIENT MEDICATION MANAGEMENT:  Current Facility-Administered Medications Ordered in Epic   Medication Dose Route Frequency Provider Last Rate Last Admin    lactated ringers infusion  125 mL/hr Intravenous  Continuous Tommy Barclay MD        sodium chloride 0.9 % flush 10 mL  10 mL Intravenous PRN Tommy Barclay MD         Current Outpatient Medications Ordered in Epic   Medication Sig Dispense Refill    carbidopa-levodopa (SINEMET)  MG per tablet Take 1 tablet by mouth 2 (Two) Times a Day.      levothyroxine (SYNTHROID, LEVOTHROID) 75 MCG tablet Take 1 tablet by mouth Every Morning.      metoprolol tartrate (LOPRESSOR) 25 MG tablet Take 0.5 tablets by mouth 2 (Two) Times a Day.      rosuvastatin (CRESTOR) 40 MG tablet Take 1 tablet by mouth Daily.      solifenacin (VESICARE) 5 MG tablet Take 1 tablet by mouth Daily.      vitamin D3 125 MCG (5000 UT) capsule capsule Take 1 capsule by mouth Daily.           PROCEDURES  Procedures      EM_CC: Critical care provider statement:    Critical care time (minutes): 44.   Critical care time was exclusive of:  Separately billable procedures and treating other patients   Critical care was necessary to treat or prevent imminent or life-threatening deterioration of the following conditions:  Severe Shock   Critical care was time spent personally by me on the following activities:  Development of treatment plan with patient or surrogate, discussions with consultants, evaluation of patient's response to treatment, examination of patient, obtaining history from patient or surrogate, ordering and performing treatments and interventions, ordering and review of laboratory studies, ordering and review of radiographic studies, pulse oximetry, re-evaluation of patient's condition and review of old charts. Critical Care indicators: Unstable Vital signs       PROGRESS, DATA ANALYSIS, CONSULTS, AND MEDICAL DECISION MAKING  All labs have been independently interpreted by me.  All radiology studies have been reviewed by me. All EKG's have been independently viewed and interpreted by me.  Discussion below represents my analysis of pertinent findings related to patient's  condition, differential diagnosis, treatment plan and final disposition.    Differential diagnosis includes but is not limited to:  Hypovolemic shock  Colitis  Diverticulitis  C. difficile colitis  Sepsis    Clinical Scores:                  ED Course as of 07/01/25 1514   Tue Jul 01, 2025   1217 BP(!): 73/48 [JR]   1315 WBC(!): 23.41 [JR]   1315 Lactate(!!): 4.4 [JR]   1318 Creatinine(!): 2.88 [JR]   1318 BUN(!): 42.0 [JR]   1318 Glucose(!): 60 [JR]   1319 AST (SGOT)(!): 99 [JR]   1319 ALT (SGPT)(!): 44 [JR]   1319 Albumin(!): 2.7 [JR]   1319 Platelets(!!): 36 [JR]   1319 Hemoglobin(!): 9.2 [JR]   1323 I informed patient and her son at bedside that she is in critical condition and wants admission to the hospital.  Her most recent blood pressure reading is better.  Discussed plan to administer antibiotics as well as IV fluids.  He confirmed that patient is a DNR/DNI. [JR]   1347 I discussed with Dr. Engel, [facility] hospitalist, who agrees to admit to telemetry bed. [JR]   1404 CT abdomen pelvis independently interpreted PACS.  There appears to be an obstructing left UPJ calculus with mild left hydronephrosis.  There is also significant motion artifact and a small left pleural effusion. [JR]   1443 Discussed with JOSESITO Garcia with urology, who agrees to consult. [JR]   1513 Urinalysis pending at time of admission however patient has been covered with appropriate antibiotics and her blood pressure has responded initially to fluid resuscitation.  Urology has been informed and consulted regarding obstructing left ureteral calculus.   [JR]      ED Course User Index  [JR] Tommy Barclay MD             AS OF 15:14 EDT VITALS:    BP - 99/42  HR - 66  TEMP - 98 °F (36.7 °C) (Oral)  O2 SATS - 95%    COMPLEXITY OF CARE  The patient requires admission.      Chronic or social conditions impacting patient care (Social Determinants of Health):     DIAGNOSIS  Final diagnoses:   Hypovolemic shock   Vomiting and diarrhea    Anemia, unspecified type   Thrombocytopenia   Leukocytosis, unspecified type   Lactic acidosis   Acute kidney injury   Left ureteral calculus           DISPOSITION  Admit      Prescription drug monitoring program review:           Please note that portions of this document were completed with a voice recognition program.    Note Disclaimer: At [facility], we believe that sharing information builds trust and better relationships. You are receiving this note because you recently visited [facility]. It is possible you will see health information before a provider has talked with you about it. This kind of information can be easy to misunderstand. To help you fully understand what it means for your health, we urge you to discuss this note with your provider.         Tommy Barclay MD  07/01/25 [id number]

## 2025-07-01 NOTE — ANESTHESIA PREPROCEDURE EVALUATION
Anesthesia Evaluation     Patient summary reviewed   no history of anesthetic complications:   NPO Solid Status: Waived due to emergency  NPO Liquid Status: Waived due to emergency           Airway   Mallampati: II  TM distance: >3 FB  Neck ROM: full  No difficulty expected  Dental      Pulmonary     breath sounds clear to auscultationSleep apnea: STOP BANG 2.  Cardiovascular     Patient on routine beta blocker  Rhythm: regular  Rate: normal    (+) hypertension, hyperlipidemia  (-) past MI, dysrhythmias, angina      Neuro/Psych  (+) CVA (h/o)    ROS Comment:    - aphasic at baseline   GI/Hepatic/Renal/Endo    (+) renal disease (Cr 2.88)- stones, ARF and CRI, thyroid problem hypothyroidism  (-)  obesity    Musculoskeletal     Abdominal    Substance History      OB/GYN          Other   blood dyscrasia (Hb 9.2, Plt 36) anemia thrombocytopenia,     ROS/Med Hx Other:    Lactate 5.5   WBC 23.4                    Anesthesia Plan    ASA 4 - emergent     general     (last BP 97/48, sats 93% on RA    I have reviewed the patient's history and chart with the patient, including all pertinent laboratory results and imaging. I have explained the risks of anesthesia including but not limited to dental damage, corneal abrasion, nerve injury, MI, stroke, aspiration, and death. Patient has agreed to proceed.     )  intravenous induction     Anesthetic plan, risks, benefits, and alternatives have been provided, discussed and informed consent has been obtained with: patient and healthcare power of .    Use of blood products discussed with patient and healthcare power of  .        CODE STATUS:    Code Status (Patient has no pulse and is not breathing): No CPR (Do Not Attempt to Resuscitate)  Medical Interventions (Patient has pulse or is breathing): Limited Support  Medical Intervention Limits: No intubation (DNI)

## 2025-07-01 NOTE — ANESTHESIA PROCEDURE NOTES
Airway  Reason: elective    Date/Time: 7/1/2025 5:47 PM  Airway not difficult    General Information and Staff    Patient location during procedure: OR  Anesthesiologist: Miguel Lutz MD    Indications and Patient Condition  Indications for airway management: airway protection    Preoxygenated: yes  MILS maintained throughout    Mask difficulty assessment: 0 - not attempted    Final Airway Details    Final airway type: supraglottic airway      Successful airway: classic  Size: 3   Number of attempts at approach: 1  Assessment: lips, teeth, and gum same as pre-op and atraumatic intubation

## 2025-07-01 NOTE — H&P
HISTORY AND PHYSICAL   [facility]        Patient Identification:  Name: Agueda Krishna  Age: 89 y.o.  Sex: female  :  10/20/1925  MRN: [id number]                     Primary Care Physician: Angel Story MD    Chief Complaint: Weakness    History of Present Illness:   I am dictating this H&P for the second time.  Initially dictated at about 4:30 PM but the document has disappeared and neither I nor tactical support confined.  History is from ER staff, records and son at bedside.  89-year-old female brought in for acute weakness.  Reported that there was vomiting and diarrhea yesterday.  Patient cannot give a history.  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  Reportedly ambulatory at baseline.    Past Medical History:  Past Medical History:   Diagnosis Date    History of stroke 2009    Hyperlipidemia     Hypertension     Hypothyroid      Past Surgical History:  Past Surgical History:   Procedure Laterality Date    CATARACT EXTRACTION Bilateral     HYSTERECTOMY      REPLACEMENT TOTAL KNEE Left     TOTAL HIP ARTHROPLASTY Right       Home Meds:  Medications Prior to Admission   Medication Sig Dispense Refill Last Dose/Taking    carbidopa-levodopa (SINEMET)  MG per tablet Take 1 tablet by mouth 2 (Two) Times a Day.   Taking    levothyroxine (SYNTHROID, LEVOTHROID) 75 MCG tablet Take 1 tablet by mouth Every Morning.   Taking    metoprolol tartrate (LOPRESSOR) 25 MG tablet Take 0.5 tablets by mouth 2 (Two) Times a Day.   Taking    rosuvastatin (CRESTOR) 40 MG tablet Take 1 tablet by mouth Daily.   Taking    solifenacin (VESICARE) 5 MG tablet Take 1 tablet by mouth Daily.   Taking    vitamin D3 125 MCG (5000 UT) capsule capsule Take 1 capsule by mouth Daily.   Taking       Allergies:  Allergies   Allergen Reactions    Nitrofurantoin Hives    Sulfa Antibiotics Hives     Immunizations:  Immunization History   Administered Date(s)  Administered    COVID-19 (PFIZER) Purple Cap Monovalent 2021, 2021, 2022     Social History:   Social History     Social History Narrative    Not on file     Social History     Tobacco Use    Smoking status: Never    Smokeless tobacco: Never   Substance Use Topics    Alcohol use: Yes     Family History:  History reviewed. No pertinent family history.     Review of Systems  Review of Systems   Reason unable to perform ROS: Patient cannot communicate.       Objective:  T Max 24 hrs: Temp (24hrs), Av °F (36.7 °C), Min:97.9 °F (36.6 °C), Max:98 °F (36.7 °C)    Vitals Ranges:   Temp:  [97.9 °F (36.6 °C)-98 °F (36.7 °C)] 97.9 °F (36.6 °C)  Heart Rate:  [64-79] 65  Resp:  [16-22] 18  BP: ()/(40-73) 97/48      Exam:  Physical Exam  Constitutional:       Comments: Normal BMI but very frail appearing.  Appears confused.   HENT:      Head: Normocephalic and atraumatic.      Right Ear: External ear normal.      Left Ear: External ear normal.      Nose: Nose normal.      Mouth/Throat:      Mouth: Mucous membranes are dry.   Eyes:      General: No scleral icterus.        Right eye: No discharge.         Left eye: No discharge.      Conjunctiva/sclera: Conjunctivae normal.      Comments: Unable to evaluate EOM.   Cardiovascular:      Rate and Rhythm: Normal rate and regular rhythm.      Heart sounds:      No friction rub. No gallop.   Pulmonary:      Effort: Pulmonary effort is normal.      Breath sounds: Normal breath sounds.   Abdominal:      General: Abdomen is flat. Bowel sounds are normal. There is no distension.      Palpations: Abdomen is soft. There is no mass.      Tenderness: There is no abdominal tenderness. There is no guarding or rebound.   Musculoskeletal:      Right lower leg: No edema.      Left lower leg: No edema.   Skin:     General: Skin is warm and dry.   Neurological:      Mental Status: She is alert.      Comments: Patient is moving all extremities but highly confused.  Looking  only to the left.  Periodically reaches out for her son.  Does not respond to the examiner even with tactile stimuli.   Psychiatric:      Comments: See neurologic exam.         Data Review:  All labs and radiology reviewed.    Assessment:  Shock: Hypovolemic shock and possibly addition of septic shock.  Continue aggressive fluid cessation.  Sepsis: Likely of urologic origin.  There is been no urinary output yet.  RN is attempting In-N-Out catheterization.  Continue spectrum antibiotics pending cultures.  FAMILIA: Secondary to above as well as obstructive renal lithiasis.  Creatinine on 6/5/2025 1.8.  Obstructive renal lithiasis: Urology evaluation appreciated.  Patient going for urgent cystoscopy.  Thrombocytopenia: Chronic but worsened.  Platelet count on 6/11/2020 2598.  Based on CT scan very possibly hypersplenism but sepsis may be accounting for the worsening.  Anemia: Acute on chronic.  Hemoglobin 6/11/2025 10.7.  Recent iron panel consistent with iron deficiency as well as anemia chronic disease.  Monitor.  Reevaluate when stable.  Cirrhosis: New diagnosis.  With evidence of portal hypertension and splenomegaly.  Metabolic cephalopathy: Patient presently on a wait for urgent cystoscopy.  Consider CT of head postop.  DNR/DNI: Discussed with son at bedside.      Plan:  Please see above.  Discussed with son at bedside.  Discussed with ER provider.    Kush Engel MD  7/1/2025  17:07 EDT    EMR Dragon/Transcription disclaimer:   Much of this encounter note is an electronic transcription/translation of spoken language to printed text. The electronic translation of spoken language may permit erroneous, or at times, nonsensical words or phrases to be inadvertently transcribed; Although I have reviewed the note for such errors, some may still exist.

## 2025-07-01 NOTE — ED NOTES
Nursing report ED to floor  Agueda Krishna  89 y.o.  female    HPI :  HPI  Stated Reason for Visit: vomiting and diarrhea  History Obtained From: patient    Chief Complaint  Chief Complaint   Patient presents with    Vomiting       Admitting doctor:   Kush Engel MD    Admitting diagnosis:   The primary encounter diagnosis was Hypovolemic shock. Diagnoses of Vomiting and diarrhea, Anemia, unspecified type, Thrombocytopenia, Leukocytosis, unspecified type, Lactic acidosis, Acute kidney injury, and Left ureteral calculus were also pertinent to this visit.    Code status:   Current Code Status       Date Active Code Status Order ID Comments User Context       7/1/2025 1323 No CPR (Do Not Attempt to Resuscitate) [id number]  Tommy Barclay MD ED        Question Answer    Code Status (Patient has no pulse and is not breathing) No CPR (Do Not Attempt to Resuscitate)    Medical Interventions (Patient has pulse or is breathing) Limited Support    Medical Intervention Limits: No intubation (DNI)                    Allergies:   Nitrofurantoin and Sulfa antibiotics    Isolation:   No active isolations    Intake and Output    Intake/Output Summary (Last 24 hours) at 7/1/2025 1503  Last data filed at 7/1/2025 1235  Gross per 24 hour   Intake 1000 ml   Output --   Net 1000 ml       Weight:       07/01/25  1234   Weight: 70.3 kg (155 lb)       Most recent vitals:   Vitals:    07/01/25 1331 07/01/25 1405 07/01/25 1427 07/01/25 1454   BP: 96/73 (!) 88/71  101/48   BP Location:       Patient Position:       Pulse: 66 70 67 65   Resp: 22  22    Temp:       TempSrc:       SpO2: 100% 100% 99% 98%   Weight:       Height:           Active LDAs/IV Access:   Lines, Drains & Airways       Active LDAs       Name Placement date Placement time Site Days    Peripheral IV 07/01/25 1226 20 G Right Antecubital 07/01/25  1226  Antecubital  less than 1    External Urinary Catheter 07/01/25  1233  --  less than 1                    Labs  (abnormal labs have a star):   Labs Reviewed   COMPREHENSIVE METABOLIC PANEL - Abnormal; Notable for the following components:       Result Value    Glucose 60 (*)     BUN 42.0 (*)     Creatinine 2.88 (*)     Chloride 111 (*)     CO2 17.0 (*)     Total Protein 5.2 (*)     Albumin 2.7 (*)     ALT (SGPT) 44 (*)     AST (SGOT) 99 (*)     eGFR 14.2 (*)     All other components within normal limits    Narrative:     GFR Categories in Chronic Kidney Disease (CKD)              GFR Category          GFR (mL/min/1.73)    Interpretation  G1                    90 or greater        Normal or high (1)  G2                    60-89                Mild decrease (1)  G3a                   45-59                Mild to moderate decrease  G3b                   30-44                Moderate to severe decrease  G4                    15-29                Severe decrease  G5                    14 or less           Kidney failure    (1)In the absence of evidence of kidney disease, neither GFR category G1 or G2 fulfill the criteria for CKD.    eGFR calculation 2021 CKD-EPI creatinine equation, which does not include race as a factor   LACTIC ACID, PLASMA - Abnormal; Notable for the following components:    Lactate 4.4 (*)     All other components within normal limits   CBC WITH AUTO DIFFERENTIAL - Abnormal; Notable for the following components:    WBC 23.41 (*)     RBC 3.31 (*)     Hemoglobin 9.2 (*)     Hematocrit 30.4 (*)     MCHC 30.3 (*)     Platelets 36 (*)     All other components within normal limits   MANUAL DIFFERENTIAL - Abnormal; Notable for the following components:    Neutrophil % 95.0 (*)     Lymphocyte % 1.0 (*)     Monocyte % 4.0 (*)     Eosinophil % 0.0 (*)     Neutrophils Absolute 22.24 (*)     Lymphocytes Absolute 0.23 (*)     Monocytes Absolute 0.94 (*)     All other components within normal limits   APTT - Abnormal; Notable for the following components:    PTT 35.5 (*)     All other components within normal limits    PROTIME-INR - Abnormal; Notable for the following components:    Protime 20.2 (*)     INR 1.72 (*)     All other components within normal limits   LIPASE - Normal   BLOOD CULTURE   BLOOD CULTURE   LACTIC ACID, REFLEX   URINALYSIS W/ MICROSCOPIC IF INDICATED (NO CULTURE)   TYPE AND SCREEN   CBC AND DIFFERENTIAL    Narrative:     The following orders were created for panel order CBC & Differential.  Procedure                               Abnormality         Status                     ---------                               -----------         ------                     CBC Auto Differential[[id number]]        Abnormal            Final result                 Please view results for these tests on the individual orders.       EKG:   No orders to display       Meds given in ED:   Medications   sodium chloride 0.9 % flush 10 mL (has no administration in time range)   lactated ringers infusion (has no administration in time range)   lactated ringers bolus 1,000 mL (0 mL Intravenous Stopped 7/1/25 1235)   piperacillin-tazobactam (ZOSYN) 3.375 g IVPB in 100 mL NS MBP (CD) (3.375 g Intravenous New Bag 7/1/25 1428)   lactated ringers bolus 2,109 mL (1,109 mL Intravenous New Bag 7/1/25 1335)   dextrose (D50W) (25 g/50 mL) IV injection 25 mL (25 mL Intravenous Given 7/1/25 1332)       Imaging results:  CT Abdomen Pelvis Without Contrast  Result Date: 7/1/2025   1. Large 7 mm x 21 mm stone in the left renal pelvis with mild left pelviectasis and mild left peripelvic and perinephric fat stranding, suggesting possible intermediate obstruction and possible urinary tract infection. Additional nonobstructing stones in the left kidney. 2. Urinary bladder wall thickening and mild perivesical fat stranding, which could be accentuated by underdistention but could reflect a nonspecific cystitis. Recommend correlation with urinalysis. 3. Mild diffuse gastric wall thickening could be accentuated by underdistention but could reflect a  nonspecific gastritis. 4. Short segment of distal ileal wall thickening, suggesting a nonspecific segmental enteritis. 5. Mildly heterogeneous hepatic attenuation with subtle nodular liver contours, which could reflect underlying hepatocellular disease/cirrhosis, with evidence of portal venous hypertension including splenomegaly, recanalization of the periumbilical vein, and trace ascites. 6. Partially imaged small left pleural effusion and trace right pleural fluid with multifocal bibasilar subsegmental atelectasis and/are scarring and patchy groundglass opacities in the dependent base of each lower lobe but could reflect superimposed pneumonia.  This report was finalized on 2025 2:53 PM by Rodrigo Alejandre MD on Workstation: [id number]        Ambulatory status:   - 1-2 assist    Social issues:   Social History     Socioeconomic History    Marital status:    Tobacco Use    Smoking status: Never    Smokeless tobacco: Never   Substance and Sexual Activity    Alcohol use: Yes       Peripheral Neurovascular  Peripheral Neurovascular (Adult)  Peripheral Neurovascular WDL: WDL    Neuro Cognitive  Neuro Cognitive (Adult)  Cognitive/Neuro/Behavioral WDL: .WDL except, arousability, level of consciousness  Level of Consciousness: Responds to Pain  Arousal Level: arouses to pain    Learning  Learning Assessment  Learning Readiness and Ability: other (see comments) (acuity of illness/ education provided to family at bedside)  Education Provided  Person Taught: family member/friend  Teaching Method: verbal instruction    Respiratory  Respiratory WDL  Respiratory WDL: .WDL except, rhythm/pattern  Rhythm/Pattern, Respiratory: tachypneic    Abdominal Pain       Pain Assessments  Pain (Adult)  Preferred Pain Scale: PAINAD (Pain Assessment in Advance Dementia Scale)  PAINAD Breathin-->occasional labored breathing, short period of hyperventilation  PAINAD Negative Vocalization: 1-->occasional moan/groan, low speech,  negative/disapproving quality  PAINAD Facial Expression: 1-->sad, frightened, frown  PAINAD Body Language: 0-->relaxed  PAINAD Consolability: 0-->no need to console  PAINAD Score: 3    NIH Stroke Scale       Dalia Krishnamurthy RN  07/01/25 15:03 EDT

## 2025-07-01 NOTE — CONSULTS
FIRST UROLOGY CONSULT      Patient Identification:  NAME:  Agueda Krishna  Age:  89 y.o.   Sex:  female   :  10/20/1925   MRN:  [id number]     Chief complaint: Weakness     History of present illness:      Pt is a 89 y.o. female who presented to the ED on 25 with c/o acute weakness. Family at bedside. Urology consulted for evaluation and treatment of FAMILIA and left nephrolithiasis. Unable to obtain history from patient. Family reports that patient began having some nausea and diarrhea yesterday. She is aphasic at baseline so it is difficult for them to gauge complaints but they report she had some non-specific lower abdominal pain. They report no known prior history of kidney stones but they report she has had issues with Gianni and urinary incontinence. She is hypotensive in ED and receiving a fluid bolus. She has been NPO except for sip of water this morning.    In hospital:  -Afebrile, hypotension, otherwise VSS,  no measured UOP  -WBC - 23.41  -Lactate - 5.5 (4.4)  -Creat - 2.88  -UA - Pending collection  -Blood Cx - In process    -CT -  Large 7 mm x 21 mm stone in the left renal pelvis with mild left pelviectasis and mild left peripelvic and perinephric fat stranding,  suggesting possible intermediate obstruction and possible urinary tract  infection. Additional nonobstructing stones in the left kidney.  2. Urinary bladder wall thickening and mild perivesical fat stranding,  which could be accentuated by underdistention but could reflect a  nonspecific cystitis.       Asked to see    Past medical history:  Past Medical History:   Diagnosis Date    History of stroke 2009    Hyperlipidemia     Hypertension     Hypothyroid        Past surgical history:  Past Surgical History:   Procedure Laterality Date    CATARACT EXTRACTION Bilateral     HYSTERECTOMY      REPLACEMENT TOTAL KNEE Left     TOTAL HIP ARTHROPLASTY Right        Allergies:  Nitrofurantoin and Sulfa antibiotics    Home medications:  (Not  in a hospital admission)       Hospital medications:     lactated ringers, 125 mL/hr, Last Rate: 125 mL/hr (25 1516)        [COMPLETED] Insert Peripheral IV **AND** sodium chloride    Family history:  History reviewed. No pertinent family history.    Social history:  Social History     Tobacco Use    Smoking status: Never    Smokeless tobacco: Never   Substance Use Topics    Alcohol use: Yes       Review of systems:      Unable to obtain    Objective:  TMax 24 hours:   Temp (24hrs), Av °F (36.7 °C), Min:98 °F (36.7 °C), Max:98 °F (36.7 °C)      Vitals Ranges:   Temp:  [98 °F (36.7 °C)] 98 °F (36.7 °C)  Heart Rate:  [64-79] 64  Resp:  [16-22] 22  BP: ()/(40-73) 91/40    Intake/Output Last 3 shifts:  No intake/output data recorded.     Physical Exam:    General Appearance:    Ill-appearing, NAD   Abdomen:     Soft, NDNT   :    Deferred       Results review:   I reviewed the patient's new clinical results.    Data review:  Lab Results (last 24 hours)       Procedure Component Value Units Date/Time    Procalcitonin [[id number]] Collected: 25 1230    Specimen: Blood Updated: 25 1555    STAT Lactic Acid, Reflex [[id number]]  (Abnormal) Collected: 25 1516    Specimen: Blood from Arm, Right Updated: 25 1547     Lactate 5.5 mmol/L     aPTT [[id number]]  (Abnormal) Collected: 25 1324    Specimen: Blood Updated: 25 1349     PTT 35.5 seconds     Protime-INR [[id number]]  (Abnormal) Collected: 25 1324    Specimen: Blood Updated: 25 1349     Protime 20.2 Seconds      INR 1.72    Manual Differential [[id number]]  (Abnormal) Collected: 25 1230    Specimen: Blood Updated: 25 1328     Neutrophil % 95.0 %      Lymphocyte % 1.0 %      Monocyte % 4.0 %      Eosinophil % 0.0 %      Basophil % 0.0 %      Neutrophils Absolute 22.24 10*3/mm3      Lymphocytes Absolute 0.23 10*3/mm3      Monocytes Absolute 0.94 10*3/mm3      Eosinophils Absolute 0.00 10*3/mm3       Basophils Absolute 0.00 10*3/mm3      Anisocytosis Mod/2+     Elliptocytes Slight/1+     Poikilocytes Mod/2+     WBC Morphology Normal     Platelet Morphology Normal    Comprehensive Metabolic Panel [[id number]]  (Abnormal) Collected: 07/01/25 1230    Specimen: Blood Updated: 07/01/25 1317     Glucose 60 mg/dL      BUN 42.0 mg/dL      Creatinine 2.88 mg/dL      Sodium 141 mmol/L      Potassium 4.9 mmol/L      Comment: Slight hemolysis detected by analyzer. Result may be falsely elevated.        Chloride 111 mmol/L      CO2 17.0 mmol/L      Calcium 8.2 mg/dL      Total Protein 5.2 g/dL      Albumin 2.7 g/dL      ALT (SGPT) 44 U/L      AST (SGOT) 99 U/L      Comment: Slight hemolysis detected by analyzer. Result may be falsely elevated.        Alkaline Phosphatase 113 U/L      Total Bilirubin 0.6 mg/dL      Globulin 2.5 gm/dL      A/G Ratio 1.1 g/dL      BUN/Creatinine Ratio 14.6     Anion Gap 13.0 mmol/L      eGFR 14.2 mL/min/1.73     Narrative:      GFR Categories in Chronic Kidney Disease (CKD)              GFR Category          GFR (mL/min/1.73)    Interpretation  G1                    90 or greater        Normal or high (1)  G2                    60-89                Mild decrease (1)  G3a                   45-59                Mild to moderate decrease  G3b                   30-44                Moderate to severe decrease  G4                    15-29                Severe decrease  G5                    14 or less           Kidney failure    (1)In the absence of evidence of kidney disease, neither GFR category G1 or G2 fulfill the criteria for CKD.    eGFR calculation 2021 CKD-EPI creatinine equation, which does not include race as a factor    Lipase [[id number]]  (Normal) Collected: 07/01/25 1230    Specimen: Blood Updated: 07/01/25 1317     Lipase 16 U/L     Lactic Acid, Plasma [[id number]]  (Abnormal) Collected: 07/01/25 1230    Specimen: Blood Updated: 07/01/25 1314     Lactate 4.4 mmol/L     CBC &  Differential [[id number]]  (Abnormal) Collected: 07/01/25 1230    Specimen: Blood Updated: 07/01/25 1306    Narrative:      The following orders were created for panel order CBC & Differential.  Procedure                               Abnormality         Status                     ---------                               -----------         ------                     CBC Auto Differential[[id number]]        Abnormal            Final result                 Please view results for these tests on the individual orders.    CBC Auto Differential [[id number]]  (Abnormal) Collected: 07/01/25 1230    Specimen: Blood Updated: 07/01/25 1306     WBC 23.41 10*3/mm3      RBC 3.31 10*6/mm3      Hemoglobin 9.2 g/dL      Hematocrit 30.4 %      MCV 91.8 fL      MCH 27.8 pg      MCHC 30.3 g/dL      RDW 14.4 %      RDW-SD 48.0 fl      Platelets 36 10*3/mm3     Blood Culture - Blood, Arm, Left [[id number]] Collected: 07/01/25 1248    Specimen: Blood from Arm, Left Updated: 07/01/25 1253    Blood Culture - Blood, Arm, Right [[id number]] Collected: 07/01/25 1246    Specimen: Blood from Arm, Right Updated: 07/01/25 1253             Imaging:  Imaging Results (Last 24 Hours)       Procedure Component Value Units Date/Time    CT Abdomen Pelvis Without Contrast [[id number]] Collected: 07/01/25 1440     Updated: 07/01/25 1457    Narrative:      CT ABDOMEN PELVIS WO CONTRAST-     DATE OF EXAM: 7/1/2025 1:41 PM     INDICATION: vomiting, diarrhea, WBC 23, hypotensive.     COMPARISON: Prior renal ultrasound 9/18/2023.     TECHNIQUE: Multiple contiguous axial images were acquired through the  abdomen and pelvis without the intravenous administration of contrast.  Reformatted coronal and sagittal sequences were also reviewed. Radiation  dose reduction techniques were utilized, including automated exposure  control and exposure modulation based on body size.     FINDINGS:  Motion artifact limits evaluation.     Partially imaged small left pleural effusion  and trace right pleural  fluid with multifocal bibasilar subsegmental atelectasis and/or  scarring. Patchy dependent groundglass opacities in the base of each  lower lobe could reflect superimposed pneumonia. Bilateral lower lobe  bronchial plugging. Partially imaged severe calcified coronary artery  disease and aortic valve calcifications. Trace pericardial fluid.     Status post cholecystectomy. Mildly heterogeneous hepatic attenuation  with subtle nodular liver contours, which could reflect underlying  hepatocellular disease/cirrhosis. Recanalization of the periumbilical  vein. Mild splenomegaly with the spleen measuring 14.5 cm in diameter.  The pancreas and adrenal glands are unremarkable in limited noncontrast  CT appearance. Incompletely evaluated 1.5 cm slightly high attenuation  exophytic lesion at the upper pole of the right kidney, probable benign  hemorrhagic or proteinaceous cyst. Large 7 mm x 21 mm stone in the left  renal pelvis with mild left pelviectasis and mild left peripelvic and  perinephric fat stranding, likely intermittently obstructing. Additional  nonobstructing 3 to 4 mm stones in the left kidney. No ureteral stone is  identified. The urinary bladder is nondistended. Mild urinary bladder  wall thickening and patchy perivesical fat stranding, which could be  accentuated by under distention. Status post hysterectomy. The adnexa  are not definitively identified.     Mild diffuse gastric wall thickening could be accentuated by under  distention but could also reflect a nonspecific gastritis. Short segment  of distal ileum in the upper right hemipelvis with mild bowel wall  thickening suggesting a nonspecific segmental enteritis. Mild colorectal  stool. Extensive colonic diverticula, without specific CT evidence for  acute diverticulitis. No bowel obstruction. The appendix is normal.     Trace perihepatic free fluid and trace free fluid in the pelvis. No free  intraperitoneal air. No  pathologically enlarged lymph nodes in the  abdomen or pelvis. Moderate to severe calcified atherosclerotic disease  in the abdominal aorta and its branches without aneurysm.     Mild diffuse anasarca. Diffuse osteopenia. Mild to moderate multilevel  lumbar spondylosis with mild likely chronic/degenerative grade 1  anterolisthesis of L3 on L4 and L4 on L5. Partially imaged right MARQUEZ  with associated hardware artifact. No evidence of hardware  complications. Mild to moderate left hip joint DJD. No acute osseous  abnormality or concerning osseous lesion. The upper extremities are  partially included in the field-of-view but are not adequately  evaluated.       Impression:         1. Large 7 mm x 21 mm stone in the left renal pelvis with mild left  pelviectasis and mild left peripelvic and perinephric fat stranding,  suggesting possible intermediate obstruction and possible urinary tract  infection. Additional nonobstructing stones in the left kidney.  2. Urinary bladder wall thickening and mild perivesical fat stranding,  which could be accentuated by underdistention but could reflect a  nonspecific cystitis. Recommend correlation with urinalysis.  3. Mild diffuse gastric wall thickening could be accentuated by  underdistention but could reflect a nonspecific gastritis.  4. Short segment of distal ileal wall thickening, suggesting a  nonspecific segmental enteritis.  5. Mildly heterogeneous hepatic attenuation with subtle nodular liver  contours, which could reflect underlying hepatocellular  disease/cirrhosis, with evidence of portal venous hypertension including  splenomegaly, recanalization of the periumbilical vein, and trace  ascites.  6. Partially imaged small left pleural effusion and trace right pleural  fluid with multifocal bibasilar subsegmental atelectasis and/are  scarring and patchy groundglass opacities in the dependent base of each  lower lobe but could reflect superimposed pneumonia.     This report  was finalized on 7/1/2025 2:53 PM by Rodrigo Alejandre MD on  Workstation: [id number]                Assessment     Left UPJ calculus  2.   Left hydronephrosis  3.   Sepsis w/ shock  4.   FAMILIA      Plan:   - Obtain straight cath UA  - Keep NPO  - Add on for urgent cystoscopy, retrograde pyelogram, left ureteral stent placement today. R/B/O discussed with family and they agree with surgical management.   - She will need definitive stone management at a later date.    Radha Juares, APRN  07/01/25  15:55 EDT    Plan discussion: The pertinent medical findings were discussed in detail with , who actively participated in the review of available data, the direction of additional testing, and formulation of the care plan. This was discussed with the patient in detail.  The patient's questions were addressed, and they expressed understanding of the proposed management.

## 2025-07-01 NOTE — OP NOTE
Operative Report     [facility]    Patient: Agueda Krishna  Age:      89 y.o.  :     10/20/1925  Sex:      female    Medical Record:  [id number]    Date of Operation/Procedure:  2025    Pre-operative Diagnosis: Left urolithiasis    Post-operative Diagnosis: Left urolithiasis    Surgeon: Joel Beebe MD    Name of Operation/Procedure:  Procedure(s) and Anesthesia Type:     * CYSTOSCOPY, RETROGRADE PYELOGRAM, AND LEFT STENT INSERTION - General    Findings/Complications:      Left hydronephrosis; mild  Obstructing 2 cm stone in the renal pelvis  Purulence in the bladder    Description of procedure: After appropriate discussion of risk and benefits and informed consent obtained patient was transported to the operating room and positioned in supine position on the operating table.  General anesthesia was induced.  Timeout was completed.  Preoperative antibiotics were administered.  Patient was repositioned in the dorsolithotomy and prepped and draped in a sterile fashion.  Cystoscope inserted per urethra and advanced into the bladder.  The bladder was full of debris and purulence.  This was evacuated.  Left ureteral orifice was cannulated the sensor guidewire advanced up to the renal pelvis.  6 British by 24 cm double-J ureteral stent was inserted over the wire and up to the renal pelvis under fluoroscopic guidance.  The wire was pulled leaving a good distal curl but there was not a good proximal curl.  The stent was removed and a Pollack catheter was inserted over the wire and a retrograde pyelogram confirmed the collecting system and the filling defect with the stone in the renal pelvis.  7 British by 26 cm double-J ureteral stent was inserted over the wire and up into the upper pole past the stone.  The wire was pulled leaving a good proximal and distal curl.  Galarza catheter was placed.  This concluded the procedure.  The patient was extubated and transported to PACU in stable condition.    Plan: Patient will  be admitted and remain on IV antibiotics.  Tailor towards cultures.  Will need a staged procedure in the future for treatment of her stone.  Will probably avoid the PCNL and patient is told.    Estimated Blood Loss: none    Specimens: * No orders in the log *    Fluids/Drains: 7 [place]ican by 26 cm double-J ureteral stent, left side, no string    Joel Beebe MD  7/1/2025  18:23 EDT

## 2025-07-01 NOTE — PROGRESS NOTES
[facility] Clinical Pharmacy Services: Piperacillin-Tazobactam Consult    Pt Name: Agueda Krishna   : 10/20/1925    Indication: Empiric, likely urologic     Relevant clinical data and objective history reviewed:    Past Medical History:   Diagnosis Date    History of stroke 2009    Hyperlipidemia     Hypertension     Hypothyroid      Creatinine   Date Value Ref Range Status   2025 2.88 (H) 0.57 - 1.00 mg/dL Final   2022 1.16 (H) 0.55 - 1.02 mg/dL Final   10/07/2021 1.2 0.7 - 1.5 mg/dL Final   2020 1.1 0.7 - 1.5 mg/dL Final     BUN   Date Value Ref Range Status   2025 42.0 (H) 8.0 - 23.0 mg/dL Final   2022 34 (H) 10 - 20 mg/dL Final     Estimated Creatinine Clearance: 11.8 mL/min (A) (by C-G formula based on SCr of 2.88 mg/dL (H)).    Lab Results   Component Value Date    WBC 23.41 (H) 2025     Temp Readings from Last 3 Encounters:   25 97.9 °F (36.6 °C) (Oral)   22 97.6 °F (36.4 °C) (Temporal)   10/24/22 97.6 °F (36.4 °C)      Assessment/Plan  Estimated CrCl <20 mL/min at this time; BMI 25 kg/m2  Will start piperacillin-tazobactam 4.5 g (higher dose for septic shock) IV every 12 hours, renally adjusted    Pharmacy will continue to follow daily while on piperacillin-tazobactam and adjust as needed. Thank you for this consult.    Mallika Brock [facility]  Clinical Pharmacist

## 2025-07-02 PROBLEM — D50.9 IRON DEFICIENCY ANEMIA: Status: ACTIVE | Noted: 2025-07-02

## 2025-07-02 PROBLEM — A41.9: Status: ACTIVE | Noted: 2025-07-02

## 2025-07-02 PROBLEM — R65.20: Status: ACTIVE | Noted: 2025-07-02

## 2025-07-02 PROBLEM — D69.59: Status: ACTIVE | Noted: 2025-07-02

## 2025-07-02 PROBLEM — E53.8 FOLATE DEFICIENCY: Status: ACTIVE | Noted: 2025-07-02

## 2025-07-02 PROBLEM — R18.8 CIRRHOSIS OF LIVER WITH ASCITES: Status: ACTIVE | Noted: 2025-07-02

## 2025-07-02 PROBLEM — K74.60 CIRRHOSIS OF LIVER WITH ASCITES: Status: ACTIVE | Noted: 2025-07-02

## 2025-07-02 PROBLEM — D73.2 SPLENOMEGALY, CONGESTIVE, CHRONIC: Status: ACTIVE | Noted: 2025-07-02

## 2025-07-02 LAB
ALBUMIN SERPL-MCNC: 2.5 G/DL (ref 3.5–5.2)
ALBUMIN/GLOB SERPL: 1 G/DL
ALP SERPL-CCNC: 104 U/L (ref 39–117)
ALT SERPL W P-5'-P-CCNC: 24 U/L (ref 1–33)
ANION GAP SERPL CALCULATED.3IONS-SCNC: 11 MMOL/L (ref 5–15)
ANISOCYTOSIS BLD QL: ABNORMAL
AST SERPL-CCNC: 60 U/L (ref 1–32)
BACTERIA BLD CULT: ABNORMAL
BASOPHILS # BLD MANUAL: 0 10*3/MM3 (ref 0–0.2)
BASOPHILS NFR BLD MANUAL: 0 % (ref 0–1.5)
BILIRUB SERPL-MCNC: 0.5 MG/DL (ref 0–1.2)
BOTTLE TYPE: ABNORMAL
BUN SERPL-MCNC: 49 MG/DL (ref 8–23)
BUN/CREAT SERPL: 16.9 (ref 7–25)
CA-I SERPL ISE-MCNC: 1.07 MMOL/L (ref 1.15–1.35)
CALCIUM SPEC-SCNC: 7.8 MG/DL (ref 8.2–9.6)
CHLORIDE SERPL-SCNC: 109 MMOL/L (ref 98–107)
CO2 SERPL-SCNC: 21 MMOL/L (ref 22–29)
CREAT SERPL-MCNC: 2.9 MG/DL (ref 0.57–1)
D-LACTATE SERPL-SCNC: 1.7 MMOL/L (ref 0.5–2)
DEPRECATED RDW RBC AUTO: 46.2 FL (ref 37–54)
EGFRCR SERPLBLD CKD-EPI 2021: 14.1 ML/MIN/1.73
ELLIPTOCYTES BLD QL SMEAR: ABNORMAL
EOSINOPHIL # BLD MANUAL: 0 10*3/MM3 (ref 0–0.4)
EOSINOPHIL NFR BLD MANUAL: 0 % (ref 0.3–6.2)
EOSINOPHIL SPEC QL MICRO: 0 % EOS/100 CELLS (ref 0–0)
ERYTHROCYTE [DISTWIDTH] IN BLOOD BY AUTOMATED COUNT: 14.4 % (ref 12.3–15.4)
FERRITIN SERPL-MCNC: 154 NG/ML (ref 13–150)
FOLATE SERPL-MCNC: 4.24 NG/ML (ref 4.78–24.2)
GLOBULIN UR ELPH-MCNC: 2.6 GM/DL
GLUCOSE SERPL-MCNC: 207 MG/DL (ref 65–99)
HAV IGM SERPL QL IA: NORMAL
HBV CORE IGM SERPL QL IA: NORMAL
HBV SURFACE AG SERPL QL IA: NORMAL
HCT VFR BLD AUTO: 29.3 % (ref 34–46.6)
HCV AB SER QL: NORMAL
HGB BLD-MCNC: 9 G/DL (ref 12–15.9)
IRON 24H UR-MRATE: 14 MCG/DL (ref 37–145)
IRON SATN MFR SERPL: 4 % (ref 20–50)
LDH SERPL-CCNC: 231 U/L (ref 135–214)
LYMPHOCYTES # BLD MANUAL: 0.69 10*3/MM3 (ref 0.7–3.1)
LYMPHOCYTES NFR BLD MANUAL: 1 % (ref 5–12)
MAGNESIUM SERPL-MCNC: 1.7 MG/DL (ref 1.7–2.3)
MCH RBC QN AUTO: 27.4 PG (ref 26.6–33)
MCHC RBC AUTO-ENTMCNC: 30.7 G/DL (ref 31.5–35.7)
MCV RBC AUTO: 89.1 FL (ref 79–97)
MONOCYTES # BLD: 0.23 10*3/MM3 (ref 0.1–0.9)
NEUTROPHILS # BLD AUTO: 22.16 10*3/MM3 (ref 1.7–7)
NEUTROPHILS NFR BLD MANUAL: 96 % (ref 42.7–76)
NRBC BLD AUTO-RTO: 0 /100 WBC (ref 0–0.2)
PHOSPHATE SERPL-MCNC: 5.1 MG/DL (ref 2.5–4.5)
PLAT MORPH BLD: NORMAL
PLATELET # BLD AUTO: 30 10*3/MM3 (ref 140–450)
PLATELET # BLD AUTO: 30 10*3/MM3 (ref 140–450)
PLATELETS.RETICULATED NFR BLD AUTO: 15.1 % (ref 0.9–6.5)
POTASSIUM SERPL-SCNC: 5.1 MMOL/L (ref 3.5–5.2)
PROT SERPL-MCNC: 5.1 G/DL (ref 6–8.5)
RBC # BLD AUTO: 3.29 10*6/MM3 (ref 3.77–5.28)
SODIUM SERPL-SCNC: 141 MMOL/L (ref 136–145)
TIBC SERPL-MCNC: 373 MCG/DL (ref 298–536)
TRANSFERRIN SERPL-MCNC: 250 MG/DL (ref 200–360)
TSH SERPL DL<=0.05 MIU/L-ACNC: 2.66 UIU/ML (ref 0.27–4.2)
VARIANT LYMPHS NFR BLD MANUAL: 3 % (ref 19.6–45.3)
VIT B12 BLD-MCNC: 842 PG/ML (ref 211–946)
WBC MORPH BLD: NORMAL
WBC NRBC COR # BLD AUTO: 23.1 10*3/MM3 (ref 3.4–10.8)

## 2025-07-02 PROCEDURE — 82272 OCCULT BLD FECES 1-3 TESTS: CPT | Performed by: INTERNAL MEDICINE

## 2025-07-02 PROCEDURE — 82607 VITAMIN B-12: CPT | Performed by: INTERNAL MEDICINE

## 2025-07-02 PROCEDURE — 97530 THERAPEUTIC ACTIVITIES: CPT

## 2025-07-02 PROCEDURE — 97166 OT EVAL MOD COMPLEX 45 MIN: CPT

## 2025-07-02 PROCEDURE — 85007 BL SMEAR W/DIFF WBC COUNT: CPT | Performed by: HOSPITALIST

## 2025-07-02 PROCEDURE — 85025 COMPLETE CBC W/AUTO DIFF WBC: CPT | Performed by: HOSPITALIST

## 2025-07-02 PROCEDURE — G0545 PR INHERENT VISIT TO INPT: HCPCS | Performed by: INTERNAL MEDICINE

## 2025-07-02 PROCEDURE — 25810000003 SODIUM CHLORIDE 0.9 % SOLUTION: Performed by: HOSPITALIST

## 2025-07-02 PROCEDURE — 82746 ASSAY OF FOLIC ACID SERUM: CPT | Performed by: INTERNAL MEDICINE

## 2025-07-02 PROCEDURE — 83615 LACTATE (LD) (LDH) ENZYME: CPT | Performed by: INTERNAL MEDICINE

## 2025-07-02 PROCEDURE — 87205 SMEAR GRAM STAIN: CPT | Performed by: HOSPITALIST

## 2025-07-02 PROCEDURE — 83735 ASSAY OF MAGNESIUM: CPT | Performed by: HOSPITALIST

## 2025-07-02 PROCEDURE — 80074 ACUTE HEPATITIS PANEL: CPT | Performed by: HOSPITALIST

## 2025-07-02 PROCEDURE — 84443 ASSAY THYROID STIM HORMONE: CPT | Performed by: HOSPITALIST

## 2025-07-02 PROCEDURE — 25010000002 CEFTRIAXONE PER 250 MG: Performed by: INTERNAL MEDICINE

## 2025-07-02 PROCEDURE — 84100 ASSAY OF PHOSPHORUS: CPT | Performed by: HOSPITALIST

## 2025-07-02 PROCEDURE — 99223 1ST HOSP IP/OBS HIGH 75: CPT | Performed by: INTERNAL MEDICINE

## 2025-07-02 PROCEDURE — 25010000002 CALCIUM GLUCONATE-NACL 1-0.675 GM/50ML-% SOLUTION: Performed by: HOSPITALIST

## 2025-07-02 PROCEDURE — 80053 COMPREHEN METABOLIC PANEL: CPT | Performed by: HOSPITALIST

## 2025-07-02 PROCEDURE — 85055 RETICULATED PLATELET ASSAY: CPT | Performed by: INTERNAL MEDICINE

## 2025-07-02 PROCEDURE — 82330 ASSAY OF CALCIUM: CPT | Performed by: HOSPITALIST

## 2025-07-02 PROCEDURE — 92610 EVALUATE SWALLOWING FUNCTION: CPT

## 2025-07-02 PROCEDURE — 25010000002 PIPERACILLIN SOD-TAZOBACTAM PER 1 G: Performed by: STUDENT IN AN ORGANIZED HEALTH CARE EDUCATION/TRAINING PROGRAM

## 2025-07-02 RX ORDER — FOLIC ACID 1 MG/1
1 TABLET ORAL DAILY
Status: DISCONTINUED | OUTPATIENT
Start: 2025-07-02 | End: 2025-07-02

## 2025-07-02 RX ORDER — FOLIC ACID 1 MG/1
1 TABLET ORAL DAILY
Status: DISCONTINUED | OUTPATIENT
Start: 2025-07-02 | End: 2025-07-10 | Stop reason: HOSPADM

## 2025-07-02 RX ORDER — SODIUM CHLORIDE 9 MG/ML
100 INJECTION, SOLUTION INTRAVENOUS CONTINUOUS
Status: DISCONTINUED | OUTPATIENT
Start: 2025-07-02 | End: 2025-07-03

## 2025-07-02 RX ORDER — CALCIUM GLUCONATE 98 MG/ML
1 INJECTION, SOLUTION INTRAVENOUS ONCE
Status: DISCONTINUED | OUTPATIENT
Start: 2025-07-02 | End: 2025-07-02

## 2025-07-02 RX ORDER — CALCIUM GLUCONATE 20 MG/ML
1000 INJECTION, SOLUTION INTRAVENOUS ONCE
Status: COMPLETED | OUTPATIENT
Start: 2025-07-02 | End: 2025-07-02

## 2025-07-02 RX ADMIN — CALCIUM GLUCONATE 1000 MG: 20 INJECTION, SOLUTION INTRAVENOUS at 17:26

## 2025-07-02 RX ADMIN — METOPROLOL TARTRATE 12.5 MG: 25 TABLET, FILM COATED ORAL at 21:39

## 2025-07-02 RX ADMIN — SODIUM CHLORIDE 100 ML/HR: 9 INJECTION, SOLUTION INTRAVENOUS at 17:28

## 2025-07-02 RX ADMIN — Medication 10 ML: at 07:52

## 2025-07-02 RX ADMIN — CARBIDOPA AND LEVODOPA 1 TABLET: 25; 100 TABLET ORAL at 21:40

## 2025-07-02 RX ADMIN — PIPERACILLIN AND TAZOBACTAM 4.5 G: 4; .5 INJECTION, POWDER, FOR SOLUTION INTRAVENOUS at 00:05

## 2025-07-02 RX ADMIN — LEVOTHYROXINE SODIUM 75 MCG: 0.07 TABLET ORAL at 05:58

## 2025-07-02 RX ADMIN — FOLIC ACID 1 MG: 1 TABLET ORAL at 17:26

## 2025-07-02 RX ADMIN — CEFTRIAXONE 2000 MG: 2 INJECTION, POWDER, FOR SOLUTION INTRAMUSCULAR; INTRAVENOUS at 12:21

## 2025-07-02 RX ADMIN — Medication 10 ML: at 21:40

## 2025-07-02 RX ADMIN — Medication 5000 UNITS: at 07:50

## 2025-07-02 RX ADMIN — CARBIDOPA AND LEVODOPA 1 TABLET: 25; 100 TABLET ORAL at 07:49

## 2025-07-02 NOTE — DISCHARGE PLACEMENT REQUEST
"rAcelia Krishna (89 y.o. Female)       Date of Birth   10/20/1925    Social Security Number       Address   [address] [place] [address]    Home Phone   [phone]    MRN   [id number]       Sikhism   Yarsani    Marital Status                               Admission Date   7/1/2025    Admission Type   Emergency    Admitting Provider   Kush Engel MD    Attending Provider   David Sandoval MD    Department, Room/Bed   [address], [address]/1       Discharge Date       Discharge Disposition       Discharge Destination                                 Attending Provider: David Sandoval MD    Allergies: Nitrofurantoin, Sulfa Antibiotics    Isolation: None   Infection: None   Code Status: No CPR    Ht: 167.6 cm (66\")   Wt: 72.4 kg (159 lb 9.8 oz)    Admission Cmt: None   Principal Problem: Hypovolemic shock [R57.1]                   Active Insurance as of 7/1/2025       Primary Coverage       Payor Plan Insurance Group Employer/Plan Group    MEDICARE MEDICARE A & B        Payor Plan Address Payor Plan Phone Number Payor Plan Fax Number Effective Dates    [address] [phone]  10/1/1990 - None Entered    [facility] [id number]         Subscriber Name Subscriber Birth Date Member ID       ARCELIA KRISHNA 10/20/1925 [id number]               Secondary Coverage       Payor Plan Insurance Group Employer/Plan Group    HUMANA HUMANA [id number]       Payor Plan Address Payor Plan Phone Number Payor Plan Fax Number Effective Dates    [address] [address] [phone]  1/1/2013 - None Entered    [facility] [address]         Subscriber Name Subscriber Birth Date Member ID       ARCELIA KRISHNA 10/20/1925 [id number]                     Emergency Contacts        (Rel.) Home Phone Work Phone Mobile Phone    shantel krishna (Son) -- -- [phone]    KRISHNAJEWELL DUNCAN (Son) -- -- [phone]                " Patient

## 2025-07-02 NOTE — SIGNIFICANT NOTE
07/02/25 1549   OTHER   Discipline physical therapist   Rehab Time/Intention   Session Not Performed patient/family declined treatment  (son at bedside, requested patient sleep. PT will follow at a later date.)   Recommendation   PT - Next Appointment 07/03/25

## 2025-07-02 NOTE — PROGRESS NOTES
"  [facility] Progress Note    Chief Complaint: None    She feels better and the family is feeling much better about her course thinks she looks markedly improved since yesterday.  Seems to be tolerating her stent well.    Review of Systems:    The following systems were reviewed and negative;  respiratory, cardiovascular, and gastrointestinal          Vital Signs  BP 95/51   Pulse 68   Temp 97.3 °F (36.3 °C) (Axillary)   Resp 18   Ht 167.6 cm (66\")   Wt 72.4 kg (159 lb 9.8 oz)   SpO2 94%   BMI 25.76 kg/m²     Physical Exam:     General Appearance:    Alert, cooperative, NAD   HEENT:    No trauma, pupils reactive, hearing intact   Back:     No CVA tenderness   Lungs:     Respirations unlabored, no wheezing    Heart:    RRR, intact peripheral pulses   Abdomen:   Soft and benign   :  External genitalia with Galarza catheter   Extremities:   No edema, no deformity   Lymphatic:   No neck or groin LAD   Skin:   No bleeding, bruising or rashes   Neuro/Psych:   Orientation intact, mood/affect pleasant, no focal findings        Results Review:     I reviewed the patient's new clinical results.  Lab Results (last 24 hours)       Procedure Component Value Units Date/Time    Ferritin [[id number]]  (Abnormal) Collected: 07/01/25 1230    Specimen: Blood Updated: 07/02/25 0847     Ferritin 154.00 ng/mL     Narrative:      Results may be falsely decreased if patient taking Biotin.      Iron Profile w/o Ferritin [[id number]]  (Abnormal) Collected: 07/01/25 1230    Specimen: Blood Updated: 07/02/25 0840     Iron 14 mcg/dL      Iron Saturation (TSAT) 4 %      Transferrin 250 mg/dL      TIBC 373 mcg/dL     Blood Culture ID, PCR - Blood, Arm, Right [[id number]]  (Abnormal) Collected: 07/01/25 1246    Specimen: Blood from Arm, Right Updated: 07/02/25 0636     BCID, PCR Escherichia coli. Identification by BCID2 PCR.     BOTTLE TYPE Anaerobic Bottle    Narrative:      No resistance genes detected.    Blood Culture - Blood, Arm, " Right [[id number]]  (Abnormal) Collected: 07/01/25 1246    Specimen: Blood from Arm, Right Updated: 07/02/25 0515     Blood Culture Abnormal Stain     Gram Stain Anaerobic Bottle Gram negative bacilli    STAT Lactic Acid, Reflex [[id number]]  (Normal) Collected: 07/01/25 2345    Specimen: Blood Updated: 07/02/25 0047     Lactate 1.7 mmol/L     STAT Lactic Acid, Reflex [[id number]]  (Abnormal) Collected: 07/01/25 2049    Specimen: Blood Updated: 07/01/25 2122     Lactate 2.2 mmol/L     Immature Platelet Fraction [[id number]]  (Abnormal) Collected: 07/01/25 2050    Specimen: Blood Updated: 07/01/25 2106     IPF 13.50 %      Platelets 22 10*3/mm3     Reticulocytes [[id number]]  (Abnormal) Collected: 07/01/25 1230    Specimen: Blood Updated: 07/01/25 1739     Reticulocyte % 2.37 %      Reticulocyte Absolute 0.0978 10*6/mm3     Narrative:      Dilution x 2    Urinalysis With Microscopic If Indicated (No Culture) - Urine, Catheter [[id number]]  (Abnormal) Collected: 07/01/25 1622    Specimen: Urine, Catheter Updated: 07/01/25 1659     Color, UA Dark Yellow     Appearance, UA Turbid     pH, UA <=5.0     Specific Gravity, UA 1.011     Glucose, UA Negative     Ketones, UA Negative     Bilirubin, UA Negative     Blood, UA Large (3+)     Protein,  mg/dL (2+)     Leuk Esterase, UA Large (3+)     Nitrite, UA Positive     Urobilinogen, UA 0.2 E.U./dL    Urinalysis, Microscopic Only - Urine, Catheter [[id number]]  (Abnormal) Collected: 07/01/25 1622    Specimen: Urine, Catheter Updated: 07/01/25 1654     RBC, UA Too Numerous to Count /HPF      WBC, UA Too Numerous to Count /HPF      Bacteria, UA 4+ /HPF      Squamous Epithelial Cells, UA 13-20 /HPF      Hyaline Casts, UA 13-20 /LPF      Granular Casts, UA Present /LPF      WBC Clumps, UA Present /HPF      Methodology Automated Microscopy    Procalcitonin [[id number]]  (Abnormal) Collected: 07/01/25 1230    Specimen: Blood Updated: 07/01/25 1626     Procalcitonin 95.30 ng/mL      "Narrative:      As a Marker for Sepsis (Non-Neonates):    1. <0.5 ng/mL represents a low risk of severe sepsis and/or septic shock.  2. >2 ng/mL represents a high risk of severe sepsis and/or septic shock.    As a Marker for Lower Respiratory Tract Infections that require antibiotic therapy:    PCT on Admission    Antibiotic Therapy       6-12 Hrs later    >0.5                Strongly Recommended  >0.25 - <0.5        Recommended   0.1 - 0.25          Discouraged              Remeasure/reassess PCT  <0.1                Strongly Discouraged     Remeasure/reassess PCT    As 28 day mortality risk marker: \"Change in Procalcitonin Result\" (>80% or <=80%) if Day 0 (or Day 1) and Day 4 values are available. Refer to [email or web address]    Change in PCT <=80%  A decrease of PCT levels below or equal to 80% defines a positive change in PCT test result representing a higher risk for 28-day all-cause mortality of patients diagnosed with severe sepsis for septic shock.    Change in PCT >80%  A decrease of PCT levels of more than 80% defines a negative change in PCT result representing a lower risk for 28-day all-cause mortality of patients diagnosed with severe sepsis or septic shock.       STAT Lactic Acid, Reflex [[id number]]  (Abnormal) Collected: 07/01/25 1516    Specimen: Blood from Arm, Right Updated: 07/01/25 1547     Lactate 5.5 mmol/L     aPTT [[id number]]  (Abnormal) Collected: 07/01/25 1324    Specimen: Blood Updated: 07/01/25 1349     PTT 35.5 seconds     Protime-INR [[id number]]  (Abnormal) Collected: 07/01/25 1324    Specimen: Blood Updated: 07/01/25 1349     Protime 20.2 Seconds      INR 1.72    Manual Differential [[id number]]  (Abnormal) Collected: 07/01/25 1230    Specimen: Blood Updated: 07/01/25 1328     Neutrophil % 95.0 %      Lymphocyte % 1.0 %      Monocyte % 4.0 %      Eosinophil % 0.0 %      Basophil % 0.0 %      Neutrophils Absolute 22.24 10*3/mm3      Lymphocytes Absolute 0.23 10*3/mm3  "     Monocytes Absolute 0.94 10*3/mm3      Eosinophils Absolute 0.00 10*3/mm3      Basophils Absolute 0.00 10*3/mm3      Anisocytosis Mod/2+     Elliptocytes Slight/1+     Poikilocytes Mod/2+     WBC Morphology Normal     Platelet Morphology Normal    Comprehensive Metabolic Panel [[id number]]  (Abnormal) Collected: 07/01/25 1230    Specimen: Blood Updated: 07/01/25 1317     Glucose 60 mg/dL      BUN 42.0 mg/dL      Creatinine 2.88 mg/dL      Sodium 141 mmol/L      Potassium 4.9 mmol/L      Comment: Slight hemolysis detected by analyzer. Result may be falsely elevated.        Chloride 111 mmol/L      CO2 17.0 mmol/L      Calcium 8.2 mg/dL      Total Protein 5.2 g/dL      Albumin 2.7 g/dL      ALT (SGPT) 44 U/L      AST (SGOT) 99 U/L      Comment: Slight hemolysis detected by analyzer. Result may be falsely elevated.        Alkaline Phosphatase 113 U/L      Total Bilirubin 0.6 mg/dL      Globulin 2.5 gm/dL      A/G Ratio 1.1 g/dL      BUN/Creatinine Ratio 14.6     Anion Gap 13.0 mmol/L      eGFR 14.2 mL/min/1.73     Narrative:      GFR Categories in Chronic Kidney Disease (CKD)              GFR Category          GFR (mL/min/1.73)    Interpretation  G1                    90 or greater        Normal or high (1)  G2                    60-89                Mild decrease (1)  G3a                   45-59                Mild to moderate decrease  G3b                   30-44                Moderate to severe decrease  G4                    15-29                Severe decrease  G5                    14 or less           Kidney failure    (1)In the absence of evidence of kidney disease, neither GFR category G1 or G2 fulfill the criteria for CKD.    eGFR calculation 2021 CKD-EPI creatinine equation, which does not include race as a factor    Lipase [[id number]]  (Normal) Collected: 07/01/25 1230    Specimen: Blood Updated: 07/01/25 1317     Lipase 16 U/L     Lactic Acid, Plasma [[id number]]  (Abnormal) Collected: 07/01/25 1230     Specimen: Blood Updated: 07/01/25 1314     Lactate 4.4 mmol/L     CBC & Differential [[id number]]  (Abnormal) Collected: 07/01/25 1230    Specimen: Blood Updated: 07/01/25 1306    Narrative:      The following orders were created for panel order CBC & Differential.  Procedure                               Abnormality         Status                     ---------                               -----------         ------                     CBC Auto Differential[[id number]]        Abnormal            Final result                 Please view results for these tests on the individual orders.    CBC Auto Differential [[id number]]  (Abnormal) Collected: 07/01/25 1230    Specimen: Blood Updated: 07/01/25 1306     WBC 23.41 10*3/mm3      RBC 3.31 10*6/mm3      Hemoglobin 9.2 g/dL      Hematocrit 30.4 %      MCV 91.8 fL      MCH 27.8 pg      MCHC 30.3 g/dL      RDW 14.4 %      RDW-SD 48.0 fl      Platelets 36 10*3/mm3     Blood Culture - Blood, Arm, Left [[id number]] Collected: 07/01/25 1248    Specimen: Blood from Arm, Left Updated: 07/01/25 1253          Imaging Results (Last 24 Hours)       Procedure Component Value Units Date/Time    FL Retrograde Pyelogram In OR [[id number]] Collected: 07/01/25 1834     Updated: 07/01/25 1839    Narrative:      Fluoroscopic retrograde pyelogram     Clinical: Ureteral calculus     FT 42 seconds   mGy 4.8   5 images     FINDINGS: Initial image demonstrates a guidewire within the left renal  collecting system. A catheter was introduced and the tip manipulated to  the pelvis. Partial retrograde filling with contrast material  demonstrates a filling defect at the UPJ. This corresponds to the stone  seen on prior CT. Double J ureteral stent was placed and the tip  manipulated into one of the upper pole calyces. Position is  satisfactory.     This report was finalized on 7/1/2025 6:36 PM by Dr. Kyle Malhotra M.D  on Workstation: [id number]       CT Abdomen Pelvis Without Contrast [[id number]]  Collected: 07/01/25 1440     Updated: 07/01/25 1457    Narrative:      CT ABDOMEN PELVIS WO CONTRAST-     DATE OF EXAM: 7/1/2025 1:41 PM     INDICATION: vomiting, diarrhea, WBC 23, hypotensive.     COMPARISON: Prior renal ultrasound 9/18/2023.     TECHNIQUE: Multiple contiguous axial images were acquired through the  abdomen and pelvis without the intravenous administration of contrast.  Reformatted coronal and sagittal sequences were also reviewed. Radiation  dose reduction techniques were utilized, including automated exposure  control and exposure modulation based on body size.     FINDINGS:  Motion artifact limits evaluation.     Partially imaged small left pleural effusion and trace right pleural  fluid with multifocal bibasilar subsegmental atelectasis and/or  scarring. Patchy dependent groundglass opacities in the base of each  lower lobe could reflect superimposed pneumonia. Bilateral lower lobe  bronchial plugging. Partially imaged severe calcified coronary artery  disease and aortic valve calcifications. Trace pericardial fluid.     Status post cholecystectomy. Mildly heterogeneous hepatic attenuation  with subtle nodular liver contours, which could reflect underlying  hepatocellular disease/cirrhosis. Recanalization of the periumbilical  vein. Mild splenomegaly with the spleen measuring 14.5 cm in diameter.  The pancreas and adrenal glands are unremarkable in limited noncontrast  CT appearance. Incompletely evaluated 1.5 cm slightly high attenuation  exophytic lesion at the upper pole of the right kidney, probable benign  hemorrhagic or proteinaceous cyst. Large 7 mm x 21 mm stone in the left  renal pelvis with mild left pelviectasis and mild left peripelvic and  perinephric fat stranding, likely intermittently obstructing. Additional  nonobstructing 3 to 4 mm stones in the left kidney. No ureteral stone is  identified. The urinary bladder is nondistended. Mild urinary bladder  wall thickening and patchy  perivesical fat stranding, which could be  accentuated by under distention. Status post hysterectomy. The adnexa  are not definitively identified.     Mild diffuse gastric wall thickening could be accentuated by under  distention but could also reflect a nonspecific gastritis. Short segment  of distal ileum in the upper right hemipelvis with mild bowel wall  thickening suggesting a nonspecific segmental enteritis. Mild colorectal  stool. Extensive colonic diverticula, without specific CT evidence for  acute diverticulitis. No bowel obstruction. The appendix is normal.     Trace perihepatic free fluid and trace free fluid in the pelvis. No free  intraperitoneal air. No pathologically enlarged lymph nodes in the  abdomen or pelvis. Moderate to severe calcified atherosclerotic disease  in the abdominal aorta and its branches without aneurysm.     Mild diffuse anasarca. Diffuse osteopenia. Mild to moderate multilevel  lumbar spondylosis with mild likely chronic/degenerative grade 1  anterolisthesis of L3 on L4 and L4 on L5. Partially imaged right MARQUEZ  with associated hardware artifact. No evidence of hardware  complications. Mild to moderate left hip joint DJD. No acute osseous  abnormality or concerning osseous lesion. The upper extremities are  partially included in the field-of-view but are not adequately  evaluated.       Impression:         1. Large 7 mm x 21 mm stone in the left renal pelvis with mild left  pelviectasis and mild left peripelvic and perinephric fat stranding,  suggesting possible intermediate obstruction and possible urinary tract  infection. Additional nonobstructing stones in the left kidney.  2. Urinary bladder wall thickening and mild perivesical fat stranding,  which could be accentuated by underdistention but could reflect a  nonspecific cystitis. Recommend correlation with urinalysis.  3. Mild diffuse gastric wall thickening could be accentuated by  underdistention but could reflect a  nonspecific gastritis.  4. Short segment of distal ileal wall thickening, suggesting a  nonspecific segmental enteritis.  5. Mildly heterogeneous hepatic attenuation with subtle nodular liver  contours, which could reflect underlying hepatocellular  disease/cirrhosis, with evidence of portal venous hypertension including  splenomegaly, recanalization of the periumbilical vein, and trace  ascites.  6. Partially imaged small left pleural effusion and trace right pleural  fluid with multifocal bibasilar subsegmental atelectasis and/are  scarring and patchy groundglass opacities in the dependent base of each  lower lobe but could reflect superimposed pneumonia.     This report was finalized on 7/1/2025 2:53 PM by Rodrigo Alejandre MD on  Workstation: [id number]               Medication Review:   I have personally reviewed    Current Facility-Administered Medications:     acetaminophen (TYLENOL) tablet 650 mg, 650 mg, Oral, Q4H PRN **OR** acetaminophen (TYLENOL) 160 MG/5ML oral solution 650 mg, 650 mg, Oral, Q4H PRN **OR** acetaminophen (TYLENOL) suppository 650 mg, 650 mg, Rectal, Q4H PRN, Joel Beebe MD    carbidopa-levodopa (SINEMET)  MG per tablet 1 tablet, 1 tablet, Oral, BID, Joel Beebe MD, 1 tablet at 07/02/25 0749    cholecalciferol (VITAMIN D3) tablet 5,000 Units, 5,000 Units, Oral, Daily, Joel Beebe MD, 5,000 Units at 07/02/25 0750    lactated ringers infusion, 9 mL/hr, Intravenous, Continuous, Jesus Guevara DO    levothyroxine (SYNTHROID, LEVOTHROID) tablet 75 mcg, 75 mcg, Oral, Q AM, Joel Beebe MD, 75 mcg at 07/02/25 0558    metoprolol tartrate (LOPRESSOR) tablet 12.5 mg, 12.5 mg, Oral, BID, David Sandoval MD    nitroglycerin (NITROSTAT) SL tablet 0.4 mg, 0.4 mg, Sublingual, Q5 Min PRN, Joel Beebe MD    ondansetron ODT (ZOFRAN-ODT) disintegrating tablet 4 mg, 4 mg, Oral, Q6H PRN **OR** ondansetron (ZOFRAN) injection 4 mg, 4 mg, Intravenous, Q6H PRN,  Joel Beebe MD    piperacillin-tazobactam (ZOSYN) 4.5 g IVPB in 100 mL NS MBP (CD), 4.5 g, Intravenous, Q12H, Joel Beebe MD, 4.5 g at 07/02/25 0005    [COMPLETED] Insert Peripheral IV, , , Once **AND** sodium chloride 0.9 % flush 10 mL, 10 mL, Intravenous, PRN, Joel Beebe MD    sodium chloride 0.9 % flush 10 mL, 10 mL, Intravenous, Q12H, Joel Beebe MD, 10 mL at 07/02/25 0752    sodium chloride 0.9 % flush 10 mL, 10 mL, Intravenous, PRN, Joel Beebe MD    sodium chloride 0.9 % infusion 40 mL, 40 mL, Intravenous, PRN, Joel Beebe MD    Allergies:    Nitrofurantoin and Sulfa antibiotics    Assessment:    Active Problems:    Hypovolemic shock    Acute kidney injury    Left ureteral calculus       Left renal calculus status post stent placement for obstructing pyelonephritis    Plan:    Continue supportive care.  Cultures pending.  Definitive stone management later.      Kannan Mary MD    7/2/2025  11:10 EDT

## 2025-07-02 NOTE — PLAN OF CARE
Goal Outcome Evaluation:  Plan of Care Reviewed With: patient, daughter, son        Progress: no change  Outcome Evaluation: Pt is a 89 y.o.female admitted 2/2 acute weakness, found to have hypovolemic shock. S/p cystoscopy, retrograde pyelogram, and Left stent insertion. PMHx includes CVA, HTN, HLD. Pt presents pleasanlty aphasic with minimal conversation, oriented to self, quite [place] and legally blind. Pt's family reports, pt lives alone, in a H with 1-2STE. PLOF: IND with I/ADLs and no AD typically for functional mobility but ocassionally uses STC or furniture. SPV for safety w/ showers. Today pt was able to complete bed mob with CGA-Kaela req increased time to scoot to EOB. Ax1 for STS xfer/ mob household distance to hallway and chair with directional cueing. BUE ROM WFL. [facility] with family present. Pt currently demonstrating impaired balance, cognition, endurance, functional mobility, and safety, indicating the need for OT. Pt at this time anticipated to d/c to a SNF/[facility] or home with 24/7 assistance/supervision pending prog.    Anticipated Discharge Disposition (OT): skilled nursing facility, home with 24/7 care, home with home health

## 2025-07-02 NOTE — PROGRESS NOTES
Called and spoke to 6 E. RN.  Patient now postop.  Mental status wise much closer to baseline.  Now recognizing interacting with her family.  Blood pressure now normalized.  Called and spoke to patient's grandson at her son's request.  Answered questions.

## 2025-07-02 NOTE — THERAPY DISCHARGE NOTE
Acute Care - Speech Language Pathology   Swallow Initial Evaluation/Discharge [facility]     Patient Name: Agueda Krishna  : 10/20/1925  MRN: [id number]  Today's Date: 2025               Admit Date: 2025    Visit Dx:    ICD-10-CM ICD-9-CM   1. Left ureteral calculus  N20.1 592.1   2. Hypovolemic shock  R57.1 785.59   3. Vomiting and diarrhea  R11.10 787.03    R19.7 787.91   4. Anemia, unspecified type  D64.9 285.9   5. Thrombocytopenia  D69.6 287.5   6. Leukocytosis, unspecified type  D72.829 288.60   7. Lactic acidosis  E87.20 276.2   8. Acute kidney injury  N17.9 584.9     Patient Active Problem List   Diagnosis    Hypovolemic shock    Acute kidney injury    Left ureteral calculus     Past Medical History:   Diagnosis Date    History of stroke 2009    Hyperlipidemia     Hypertension     Hypothyroid      Past Surgical History:   Procedure Laterality Date    CATARACT EXTRACTION Bilateral     HYSTERECTOMY      REPLACEMENT TOTAL KNEE Left     TOTAL HIP ARTHROPLASTY Right        SLP Recommendation and Plan  SLP Swallowing Diagnosis: suspected pharyngeal dysphagia, R/O pharyngeal dysphagia (25)        Monitor for Signs of Aspiration: yes, notify SLP if any concerns (25)  Recommended Diagnostics: reassess via VFSS ([facility]), other (see comments) (POA and patient refused) (25)           Therapy Frequency (Swallow): evaluation only (25)                                           Outcome Evaluation: Patient seen for clinical swallow assessment. Hx of NPO and PEG tube following CVA 14 years ago with residual expressive aphasia. Live home alone. Family present feel patient is at baseline with her speech and swallow function. Pt with intermittent coughing with thins at baseline. Pt denies dysphagia. [facility] revealed questionnable oral motor and verbal apraxia and R facial droop. No overt s/s of aspiration with ice. Persistent throat clear after the swallow with  "thin via spoon, nectar via spoon, honey via spoon/cup, puree, and mech soft without voice change. No oral residue post swallow. Suspected swallow mistiming and voice change with thins via cup. SLP recs VFSS to further assess. Pt refused, but family present wishes for POA to make decision. Of note, patient without pna hx per family. (07/02/25 1032)    SWALLOW EVALUATION (Last 72 Hours)       SLP Adult Swallow Evaluation       Row Name 07/02/25 0800                   Rehab Evaluation    Document Type evaluation  -SH        Patient Effort good  -SH           General Information    Patient Profile Reviewed yes  -SH        Pertinent History Of Current Problem \"Shock: Hypovolemic shock and possibly addition of septic shock.  Continue aggressive fluid cessation.  Sepsis: Likely of urologic origin.  There is been no urinary output yet.  RN is attempting In-N-Out catheterization.  Continue spectrum antibiotics pending cultures.  FAMILIA: Secondary to above as well as obstructive renal lithiasis.  Creatinine on 6/5/2025 1.8.  Obstructive renal lithiasis: Urology evaluation appreciated.  Patient going for urgent cystoscopy.  Thrombocytopenia: Chronic but worsened.  Platelet count on 6/11/2020 2598.  Based on CT scan very possibly hypersplenism but sepsis may be accounting for the worsening.  Anemia: Acute on chronic.  Hemoglobin 6/11/2025 10.7.  Recent iron panel consistent with iron deficiency as well as anemia chronic disease.  Monitor.  Reevaluate when stable.  Cirrhosis: New diagnosis.  With evidence of portal hypertension and splenomegaly.  Metabolic cephalopathy: Patient presently on a wait for urgent cystoscopy.  Consider CT of head postop.  \"  -SH        Current Method of Nutrition chopped;nectar/syrup-thick liquids  -        Precautions/Limitations, Vision vision impairment, bilaterally  -SH        Precautions/Limitations, Hearing hearing impairment, bilaterally  -SH        Prior Level of Function-Communication " expressive language impairment  -        Prior Level of Function-Swallowing other (see comments)  NPO and PEG following stroke, but recovered swallow function per family  -        Plans/Goals Discussed with patient;family  -        Barriers to Rehab medically complex;previous functional deficit  aphasia  -           Pain    Pre/Posttreatment Pain Comment No obvious signs of distress  -           Oral Musculature and Cranial Nerve Assessment    Oral Motor General Assessment oral labial or buccal impairment  -           Clinical Swallow Eval    Clinical Swallow Evaluation Summary Patient seen for clinical swallow assessment. Hx of NPO and PEG tube following CVA 14 years ago with residual expressive aphasia. Family indicated speech is at baseline. Lives home alone. Family present feel patient is at baseline with her speech and swallow function. Pt with intermittent coughing with thins at baseline. Pt denies dysphagia. Oral mech exam revealed questionnable oral motor and verbal apraxia and R facial droop. No overt s/s of aspiration with ice. Persistent throat clear after the swallow with thin via spoon, nectar via spoon, honey via spoon/cup, puree, and mech soft without voice change. No oral residue post swallow. Suspected swallow mistiming and voice change with thins via cup. SLP recs VFSS to further assess. Pt refused, but family present wishes for POA to make decision. Of note, patient without pna hx per family.     Discussed with POA, Epifanio, over the phone. He feels the dysphagia witnessed yesterday was 2/2 agitation and reclined positioning. He would prefer to allow an unrestricted diet prior to proceeding with VFSS. Will sign off, please order VFSS as needed.  -           SLP Evaluation Clinical Impression    SLP Swallowing Diagnosis suspected pharyngeal dysphagia;R/O pharyngeal dysphagia  -           Recommendations    Therapy Frequency (Swallow) evaluation only  -        Recommended  Diagnostics reassess via VFSS ([facility] [place]other (see comments)  POA and patient refused  -        Monitor for Signs of Aspiration yes;notify SLP if any concerns  -                  User Key  (r) = Recorded By, (t) = Taken By, (c) = Cosigned By      Initials Name Effective Dates    Ping Stone SLP 01/05/24 -                     EDUCATION  The patient has been educated in the following areas:   Dysphagia (Swallowing Impairment).                   Time Calculation:    Time Calculation- SLP       Row Name 07/02/25 1276             Time Calculation- [facility]    SLP Start Time 0800  -      SLP Received On 07/02/25  -                User Key  (r) = Recorded By, (t) = Taken By, (c) = Cosigned By      Initials Name Provider Type    Ping Stone SLP Speech and Language Pathologist                    Therapy Charges for Today       Code Description Service Date Service Provider Modifiers Qty    [id number]  ST EVAL ORAL PHARYNG SWALLOW 6 7/2/2025 Ping Jones SLP GN 1                      EZE Silver  7/2/2025

## 2025-07-02 NOTE — PROGRESS NOTES
"    DAILY PROGRESS NOTE  [facility]    Patient Identification:  Name: Agueda Krishna  Age: 89 y.o.  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  Family x 2 and bedside in case more so discussed with them as well as in multidisciplinary rounds    Objective:    Scheduled Meds:carbidopa-levodopa, 1 tablet, Oral, BID  cholecalciferol, 5,000 Units, Oral, Daily  levothyroxine, 75 mcg, Oral, Q AM  metoprolol tartrate, 12.5 mg, Oral, BID  piperacillin-tazobactam, 4.5 g, Intravenous, Q12H  sodium chloride, 10 mL, Intravenous, Q12H      Continuous Infusions:lactated ringers, 9 mL/hr  Pharmacy To Dose:,         Vital signs in last 24 hours:  Temp:  [97.3 °F (36.3 °C)-98 °F (36.7 °C)] 97.3 °F (36.3 °C)  Heart Rate:  [61-79] 61  Resp:  [16-22] 18  BP: ()/() 118/40    Intake/Output:    Intake/Output Summary (Last 24 hours) at 2025 0715  Last data filed at 2025 0600  Gross per 24 hour   Intake 4409 ml   Output 350 ml   Net 4059 ml       Exam:  /40 (BP Location: Left arm, Patient Position: Lying)   Pulse 61   Temp 97.3 °F (36.3 °C) (Axillary)   Resp 18   Ht 167.6 cm (66\")   Wt 72.4 kg (159 lb 9.8 oz)   SpO2 94%   BMI 25.76 kg/m²     General Appearance:    Alert, cooperative, does not look her age                         Throat:   MMM                           Neck:   Supple, no meningismus or JVD                         Lungs:    Clear to auscultation bilaterally, respirations unlabored                 Chest Wall:    No tenderness or deformity                          Heart:    Regular rate and rhythm, S1 and S2 normal                  Abdomen:     Soft, nontender, bowel sounds active                 Extremities:  GW though moving " all, no pitting edema                        Pulses:   Pulses palpable in all extremities                            Skin:  Senile purpura     Data Review:  Labs in chart were reviewed.    Assessment:  Active Hospital Problems    Diagnosis  POA    **Hypovolemic shock [R57.1]  Yes    Acute kidney injury [N17.9]  Unknown    Left ureteral calculus [N20.1]  Unknown      Resolved Hospital Problems   No resolved problems to display.       Plan:    E. coli septicemia secondary to  etiology/UTI with left urolithiasis status post cystoscopy with stent placement per  on 7/1/2025   - BC x 1 positive-sepsis overall resolving with fluid resuscitation vitals more stable   - Zosyn initiated admission and will continue for now although anticipate transition.  ID consult placed   - Resolving metabolic encephalopathy with medical management   - Parameters adjusted on metoprolol        Severe TCP likely influenced by new diagnosis of cirrhosis made worse by sepsis   - Monitor for now   - Anemia of chronic disease   - Not endorsing cirrhosis workup just yet given her advanced age and current clinical situation   - Hematological consult placed    Hypothyroidism on levothyroxine      CODE STATUS DNR    Past history of CVA with aphasia-modified diet        *Numerous labs still pending from today?  CBC/CMP/folate/hepatitis panel/IPF/LDH/mag/phosphorus/TSH reflex/B12/ionized calcium      Addendum: lab f/up crt 2.9 - 2L IVF over 20hours and repeat bmp in am. Consider renal consult pending further trends. I anticipate improvement w/ fluids    -Urology following - Large left stone   -ATN/postobstructive ARF - sepsis o/w resolving. Check urine for eosinophils    -hep NR. Tsh normal    -heme addressed anemia/tcp/folic acid deficiency    -wbc pronounced though clinically much better   -palliative consult paused   -appreciate Dr Yuen and agree w switch to Cherrie Sandoval MD  7/2/2025  07:15 EDT

## 2025-07-02 NOTE — CONSULTS
Referring Provider: Kush Engel MD      Subjective   History of present illness: 89-year-old admitted 7/1 with a 1 day history of fatigue and diarrhea.  Patient was afebrile on admission but had an elevated lactate to 5.5, creatinine to 2.88 and a white count of 23,000.  Patient started empirically on Zosyn and CT abdomen pelvis obtained which showed large 7 mm x 21 mm stone in the left renal pelvis with mild left pelvic atelectasis and mild left peripelvic and perinephric fat stranding.  The patient went to the operating room with urology for stent placement on 7/1.  Blood cultures growing E. coli 1 out of 2 sets.  She has a history of recurrent UTIs and most recently had broadly susceptible E. coli from June 2025 at [facility] which appears to have been treated with amoxicillin        Physical Exam:   Vital Signs   Temp:  [97.3 °F (36.3 °C)-98 °F (36.7 °C)] 97.3 °F (36.3 °C)  Heart Rate:  [61-79] 68  Resp:  [16-22] 18  BP: ()/() 95/51    GENERAL: Awake and alert, in no acute distress.   HEENT: Oropharynx is clear. Hearing is grossly normal.   EYES: . No conjunctival injection. No lid lag.   LUNGS:normal respiratory effort.   SKIN: no cutaneous eruptions in exposed areas  PSYCHIATRIC: Appropriate mood, affect, insight, and judgment.     Results Review:  Procalcitonin 95.3  White count 23.41, hemoglobin 9, platelets 22  Lactate 5.5 now 1.7  Creatinine 2.88 (1.8 on June 4, 2025)  Blood culture 1/2 E. coli  CT abdomen pelvis as per HPI.  Independently interpreted lung windows and has patchy changes at the bases with small effusions      A/p  E. coli bacteremia secondary to pyelonephritis and obstructing nephrolithiasis status post stent placement  Acute kidney injury on chronic kidney disease stage 3, antibiotics dosed appropriately  Thrombocytopenia secondary to sepsis    Patient seems to be improving after admission for severe pyelonephritis from obstructing nephrolithiasis with E. coli bacteremia.   Based on prior cultures including urine culture from just a few weeks ago, I think we can de-escalate her from Zosyn to ceftriaxone 2 g IV every 24 hours.  Anticipate several more days of IV antibiotics with hopeful eventual transition to oral antibiotics to complete 1 week of therapy      Thank you for this consult.  We will continue to follow along and tailor antibiotics as the patient's clinical course evolves.                                                              The above decisions regarding antimicrobials incorporates elements of engaging in complex medical decision-making associated with antimicrobial prescribing including considerations like antimicrobial resistance patterns, interactions/complications from comorbidities including concurrent infections, public health considerations to minimize development of antimicrobial resistance, recent antibiotic exposure,

## 2025-07-02 NOTE — PLAN OF CARE
Goal Outcome Evaluation:  Plan of Care Reviewed With: patient, child           Outcome Evaluation: Patient seen for clinical swallow assessment. Hx of NPO and PEG tube following CVA 14 years ago with residual expressive aphasia. Family indicated speech is at baseline. Lives home alone. Family present feel patient is at baseline with her speech and swallow function. Pt with intermittent coughing with thins at baseline. Pt denies dysphagia. Oral mech exam revealed questionnable oral motor and verbal apraxia and R facial droop. No overt s/s of aspiration with ice. Persistent throat clear after the swallow with thin via spoon, nectar via spoon, honey via spoon/cup, puree, and mech soft without voice change. No oral residue post swallow. Suspected swallow mistiming and voice change with thins via cup. SLP recs VFSS to further assess. Pt refused, but family present wishes for POA to make decision. Of note, patient without pna hx per family.    Discussed with POA, Epifanio, over the phone. He feels the dysphagia witnessed yesterday was 2/2 agitation and reclined positioning. He would prefer to allow an unrestricted diet prior to proceeding with VFSS. Will sign off, please order VFSS as needed.

## 2025-07-02 NOTE — CONSULTS
Patient Name: Agueda Krishna  YOB: 1925  MRN: [id number]  Admission date: 7/1/2025  Reason for Encounter: MD Consult  and [facility] Clinical Nutrition Assessment     Subjective    Subjective Information     Pt is a 89 y.o. female with a history of stroke, hyperlipidemia, hypertension who presented with acute weakness. Pt's family reporting episodes of vomiting and diarrhea prior to admission. 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    7/2: Visited pt bedside. Family present and was able to provide majority of nutrition r/t history. Pt lives home alone. Family reports fair appetite and states pt typically eats breakfast in the morning and will have smaller meals during the day. Pt does not drink oral nutrition supplements at home, declines them here. Family states some possible gradual wt loss over the last few months but not a significant amount. No significant wt loss per wt hx. Some visual muscle wasting noticed, however, based on nutrition hx provided by family, suspect wasting more likely r/t to aging rather than pt's nutritional status.     Assessment    H&P and Current Problems      H&P  Past Medical History:   Diagnosis Date    History of stroke 03/31/2009    Hyperlipidemia     Hypertension     Hypothyroid       Past Surgical History:   Procedure Laterality Date    CATARACT EXTRACTION Bilateral     HYSTERECTOMY      REPLACEMENT TOTAL KNEE Left     TOTAL HIP ARTHROPLASTY Right       Current Problems   Admission Diagnosis:  Hypovolemic shock [R57.1]  Lactic acidosis [E87.20]  Thrombocytopenia [D69.6]  Left ureteral calculus [N20.1]  Acute kidney injury [N17.9]  Vomiting and diarrhea [R11.10, R19.7]  Anemia, unspecified type [D64.9]  Leukocytosis, unspecified type [D72.829]    Problem List:    Hypovolemic shock    Acute kidney injury    Left ureteral calculus       Anthropometrics      BMI, Height, Weight Estimated body  "mass index is 25.76 kg/m² as calculated from the following:    Height as of this encounter: 167.6 cm (66\").    Weight as of this encounter: 72.4 kg (159 lb 9.8 oz).    Weight Method: Bed scale       Trending Weight Changes 7/2:No significant changes       Weight History  Wt Readings from Last 10 Encounters:   07/02/25 72.4 kg (159 lb 9.8 oz)   11/17/22 74.8 kg (165 lb)   10/24/22 74.8 kg (165 lb)   10/10/22 63.5 kg (140 lb)        Labs      Comment: Reviewed.     Results from last 7 days   Lab Units 07/01/25  2345 07/01/25 2049 07/01/25  1516 07/01/25  1230   SODIUM mmol/L  --   --   --  141   POTASSIUM mmol/L  --   --   --  4.9   GLUCOSE mg/dL  --   --   --  60*   BUN mg/dL  --   --   --  42.0*   CREATININE mg/dL  --   --   --  2.88*   CALCIUM mg/dL  --   --   --  8.2   ALBUMIN g/dL  --   --   --  2.7*   LACTATE mmol/L 1.7 2.2* 5.5* 4.4*   BILIRUBIN mg/dL  --   --   --  0.6   ALK PHOS U/L  --   --   --  113   AST (SGOT) U/L  --   --   --  99*   ALT (SGPT) U/L  --   --   --  44*     Results from last 7 days   Lab Units 07/01/25 2050 07/01/25  1230   PLATELETS 10*3/mm3 22* 36*   HEMOGLOBIN g/dL  --  9.2*   HEMATOCRIT %  --  30.4*   IRON mcg/dL  --  14*     No results found for: \"HGBA1C\"       Medications       Scheduled Medications carbidopa-levodopa, 1 tablet, Oral, BID  cholecalciferol, 5,000 Units, Oral, Daily  levothyroxine, 75 mcg, Oral, Q AM  metoprolol tartrate, 12.5 mg, Oral, BID  piperacillin-tazobactam, 4.5 g, Intravenous, Q12H  sodium chloride, 10 mL, Intravenous, Q12H        Infusions lactated ringers, 9 mL/hr        PRN Medications   acetaminophen **OR** acetaminophen **OR** acetaminophen    nitroglycerin    ondansetron ODT **OR** ondansetron    [COMPLETED] Insert Peripheral IV **AND** sodium chloride    sodium chloride    sodium chloride     Physical Findings      Chewing/Swallowing    SLP consulted d/t not able to swallow meds, choked on sip of water per documentation    Dentition Mouth/Teeth WDL: " .WDL except, teeth   Teeth Symptoms: tooth/teeth missing   Edema   no edema    Gastrointestinal diarrhea, fecal incontinence, last bowel movement: 7/1   Skin bruising, pale    Lines/Drains none   I/O reviewed      Nutrition Focused Physical Exam     NFPE 07/02/25 assessed pt visually d/t pt being aphasic from previous stroke, legally blind and hard of hearing - muscle wasting visually noticed though suspect this is related to aging rather than nutritional status based on nutrition hx provided by family     Malnutrition Severity Assessment            (1)   Current Nutrition Orders & Evaluation of Intake      Oral Nutrition     Food Allergies  and Intolerances NKFA   Current PO Diet Diet: Regular/House; Texture: Soft to Chew (NDD 3); Soft to Chew: Chopped Meat; Fluid Consistency: [place] Thick   Oral Nutrition Supplement None     Trending % PO Intake Limited po intake recorded   (2)  Assessment & Plan   Nutrition Diagnosis and Goals       Nutrition Diagnosis Problem: No Nutrition Diagnosis at this Time  Etiology:    Signs/Symptoms:         Goal(s) Establish PO Intake, Tolerates PO Diet , and No Significant Weight Loss     Nutrition Intervention and Prescription       Intervention  Will monitor po intakes to assess for any inadequate po intake.      Diet Prescription -    Supplement Prescription Family declined    Education Provided  -   (3)  Monitoring/Evaluation       Monitor/Evaluation Per Protocol, PO Intake, and Weight      Electronically signed by:  Bette Mendez RD  07/02/25 10:41 EDT

## 2025-07-02 NOTE — CONSULTS
.     REASON FOR CONSULTATION:     Provide an opinion on any further workup or treatment severe thrombocytopenia and mild anemia.                             REQUESTING PHYSICIAN: Kush Engel MD    RECORDS OBTAINED:  Records of the patient's history including those obtained from the referring provider were reviewed and summarized in detail.    HISTORY OF PRESENT ILLNESS:  The patient is a 89 y.o. year old female with history of hypertension hyperlipidemia, history of a stroke, hypothyroidism, and also chronic thrombocytopenia who presented to [facility] yesterday, brought in by her sons because of altered mental status and the looks lethargic.  Patient was found to having sepsis with hypotension, also nephrolithiasis.  She was brought to hospital for further evaluation and management.     Patient was started on IV antibiotics and IV hydration.    At the time of her ER visit yesterday on 7/1/2025 laboratory studies reported platelets 36,000, hemoglobin 9.2, MCV 91.8, and WBC 23,400 including neutrophils 22,240 lymphocytes 230 monocytes 940.  Chemistry lab showed elevated lactate 4.4, and a procalcitonin 95.3, lipase 16, and the acute renal injury with a creatinine 2.88 BUN 42, sodium 141 potassium 4.9, glucose 60, calcium 8.2 and elevated AST 99 ALT 44 normal alk phos 113 and a total bilirubin 0.6.    Patient had a CT for the abnormal with no IV contrast 7/1/2025.  This reported a large 7 mm x 21 mm stone in the left renal pelvis with mild left pelviectasis.  Urinary bladder wall thickening with the perivesical fat stranding.  There is also moderate diffuse gastric wall thickening.  Short segment of distal ileal wall thickening.  There was evidence for nodular liver contours reflecting underlying hepatocellular disease/cirrhosis with evidence of portal vein hypertension and splenomegaly.  There is recannulization of the periumbilical veins and trace ascites.    Yesterday evening on 7/1/2025  patient had a cystoscopic examination by Dr. Beebe.  According to the procedure note, patient has purulence content in the bladder.  There was stent placement into the left ureter.    Lab study in the night on 7/1/2025 reported platelets 22,000 with IPF 13.5%.    This morning on 7/2/2025 blood culture was obtained in the ER was already positive for bacteria, identified as E. coli.  Sensitivity results is pending.    We also obtained a laboratory study using the blood sample from 7/1/2025 which reported ferritin 154, free iron 14 and iron saturation 4% with TIBC 373.    Repeat laboratory studies this noon time reported marginally improved the platelets 30,000, with IPF 15.1%.  Hemoglobin 9.0, and a WBC 23,100 including neutrophils 22,160.  B12 was 842 however low folate 4.24 ng/mL.  Normal TSH 2.66 in the chemistry labs showed a creatinine 2.90 BUN 49, calcium 7.8, albumin 2.5 and total protein 5.1 with a improved liver function panel AST 60, normalized ALT 24 and maintains normal alk phos and a total bilirubin 0.5.    According to his son who presents at the bedside, this patient lives by herself independently.  When his son visited patient yesterday and he found the patient was lethargic and brought it to the emergency room.  According to the son, this patient had a recent visit with the primary care physician and the laboratory study already showed thrombocytopenia however he does not remember the details.    There is no previous lipid results from the [facility] epic system.  However I searched EMR at the [facility].  2015  The earliest the CBC results was from 3/6/2015 which reported platelets 132,000, hemoglobin 12.0, and WBC 8000 including neutrophils 6000 lymphocytes 1300 monocytes 700.    Patient had multiple laboratory studies every single year from [id number] however surprisingly there was no CBC in those years.  Lab study on 6/4/2025 with a CBC results showed a platelets 84,000, hemoglobin 10.1 MCV 92.5  and a WBC 7060.  Her creatinine was 1.80, normal electrolytes, total protein 5.8 albumin 3.4 and globulin 2.4.  Her AST was 37, normal ALT alk phos and total bilirubin.    On 6/9/2025 platelets was 73,000, hemoglobin 10.5 and WBC 6570 neutrophils of 4580.  Her ferritin was 40, Free iron 18, TIBC 369 and iron saturation 5%.    On 6/11/2025 platelets was 90,000, hemoglobin 10.7 MCV 90.7 and WBC 7540.    According to patient's son, this patient has intermittent constipation and diarrhea however he does not remember patient mention any black stool or blood in the stool.    Elevated her home medication list, there were levothyroxine, sentiment, metoprolol, Crestor, Vesicare and vitamin D 3.  No evidence for iron supplement, no vitamins.         Past Medical History:   Diagnosis Date    History of stroke 03/31/2009    Hyperlipidemia     Hypertension     Hypothyroid      Past Surgical History:   Procedure Laterality Date    CATARACT EXTRACTION Bilateral     HYSTERECTOMY      REPLACEMENT TOTAL KNEE Left     TOTAL HIP ARTHROPLASTY Right        MEDICATIONS    Current Facility-Administered Medications:     acetaminophen (TYLENOL) tablet 650 mg, 650 mg, Oral, Q4H PRN **OR** acetaminophen (TYLENOL) 160 MG/5ML oral solution 650 mg, 650 mg, Oral, Q4H PRN **OR** acetaminophen (TYLENOL) suppository 650 mg, 650 mg, Rectal, Q4H PRN, Joel Beebe MD    carbidopa-levodopa (SINEMET)  MG per tablet 1 tablet, 1 tablet, Oral, BID, Joel Beebe MD, 1 tablet at 07/02/25 0749    cefTRIAXone (ROCEPHIN) 2,000 mg in sodium chloride 0.9 % 100 mL MBP, 2,000 mg, Intravenous, Q24H, Jaquan Yuen MD, Last Rate: 200 mL/hr at 07/02/25 1221, 2,000 mg at 07/02/25 1221    cholecalciferol (VITAMIN D3) tablet 5,000 Units, 5,000 Units, Oral, Daily, Joel Beebe MD, 5,000 Units at 07/02/25 0750    lactated ringers infusion, 9 mL/hr, Intravenous, Continuous, Jesus Guevara DO    levothyroxine (SYNTHROID, LEVOTHROID)  tablet 75 mcg, 75 mcg, Oral, Q AM, Joel Beebe MD, 75 mcg at 07/02/25 0558    metoprolol tartrate (LOPRESSOR) tablet 12.5 mg, 12.5 mg, Oral, BID, David Sandoval MD    nitroglycerin (NITROSTAT) SL tablet 0.4 mg, 0.4 mg, Sublingual, Q5 Min PRN, Joel Beebe MD    ondansetron ODT (ZOFRAN-ODT) disintegrating tablet 4 mg, 4 mg, Oral, Q6H PRN **OR** ondansetron (ZOFRAN) injection 4 mg, 4 mg, Intravenous, Q6H PRN, Joel Beebe MD    [COMPLETED] Insert Peripheral IV, , , Once **AND** sodium chloride 0.9 % flush 10 mL, 10 mL, Intravenous, PRN, Joel Beebe MD    sodium chloride 0.9 % flush 10 mL, 10 mL, Intravenous, Q12H, Joel Beebe MD, 10 mL at 07/02/25 0752    sodium chloride 0.9 % flush 10 mL, 10 mL, Intravenous, PRN, Joel Beebe MD    sodium chloride 0.9 % infusion 40 mL, 40 mL, Intravenous, PRN, Joel Beebe MD    ALLERGIES:     Allergies   Allergen Reactions    Nitrofurantoin Hives    Sulfa Antibiotics Hives       SOCIAL HISTORY:       Social History     Socioeconomic History    Marital status:    Tobacco Use    Smoking status: Never    Smokeless tobacco: Never   Vaping Use    Vaping status: Never Used   Substance and Sexual Activity    Alcohol use: Yes     Alcohol/week: 1.0 standard drink of alcohol     Types: 1 Shots of liquor per week     Comment: one weekly    Drug use: Defer    Sexual activity: Defer         FAMILY HISTORY:  History reviewed. No pertinent family history.    REVIEW OF SYSTEMS:  Review of Systems  See HPI           Vitals:    07/02/25 0000 07/02/25 0400 07/02/25 0500 07/02/25 0750   BP: 118/40 Comment: refused b/p  95/51   BP Location: Left arm      Patient Position: Lying      Pulse: 66 61  68   Resp: 18 18     Temp: 97.3 °F (36.3 °C)      TempSrc: Axillary      SpO2: 97% 94%     Weight:   72.4 kg (159 lb 9.8 oz)    Height:              No data to display               PHYSICAL EXAM:      CONSTITUTIONAL:  Vital signs reviewed.  Patient looks weak  laying in bed, however reports improved compared to yesterday.  No distress, looks comfortable.  EYES:  Conjunctivae and lids unremarkable.    EARS,NOSE,MOUTH,THROAT:  Ears and nose appear unremarkable.  RESPIRATORY:  Normal respiratory effort.  Lungs clear to auscultation bilaterally.  CARDIOVASCULAR:  Normal S1, S2.  No murmurs rubs or gallops.  No significant lower extremity edema.  GASTROINTESTINAL: Abdomen appears unremarkable.  Nontender.  No hepatomegaly.  No splenomegaly.  MUSCULOSKELETAL:  No cyanosis or clubbing.  SKIN:  Warm.  No rashes.  PSYCHIATRIC:  Normal judgment and insight.  Normal mood and affect.      RECENT LABS:  CBC:      Lab 07/02/25  1143 07/01/25 2050 07/01/25  1230   WBC 23.10*  --  23.41*   HEMOGLOBIN 9.0*  --  9.2*   HEMATOCRIT 29.3*  --  30.4*   PLATELETS 30*  30* 22* 36*   NEUTROS ABS 22.16*  --  22.24*   EOS ABS 0.00  --  0.00   MCV 89.1  --  91.8     Component      Latest Ref Rng 7/1/2025 7/2/2025   IPF      0.90 - 6.50 % 13.50 (H)  15.10 (H)    Platelets      140 - 450 10*3/mm3 22 (LL)  30 (LL)         CMP:        Lab 07/02/25  1143 07/01/25  1230   SODIUM 141 141   POTASSIUM 5.1 4.9   CHLORIDE 109* 111*   CO2 21.0* 17.0*   ANION GAP 11.0 13.0   BUN 49.0* 42.0*   CREATININE 2.90* 2.88*   EGFR 14.1* 14.2*   GLUCOSE 207* 60*   CALCIUM 7.8* 8.2   IONIZED CALCIUM 1.07*  --    MAGNESIUM 1.7  --    PHOSPHORUS 5.1*  --    TOTAL PROTEIN 5.1* 5.2*   ALBUMIN 2.5* 2.7*   GLOBULIN 2.6 2.5   ALT (SGPT) 24 44*   AST (SGOT) 60* 99*   BILIRUBIN 0.5 0.6   ALK PHOS 104 113   LIPASE  --  16     Lab Results   Component Value Date    IRON 14 (L) 07/01/2025    LABIRON 4 (L) 07/01/2025    TRANSFERRIN 250 07/01/2025    TIBC 373 07/01/2025    FERRITIN 154.00 (H) 07/01/2025     Lab Results   Component Value Date    FOLATE 4.24 (L) 07/02/2025     Lab Results   Component Value Date    [id number] 842 07/02/2025       PERIPHERAL BLOOD SMEAR: I reviewed her peripheral blood smear today on 7/2/2025.  There is a  significant increase of platelets however no clumping of the platelets.  There is increased WBC however no evidence for leukemia or lymphoma.  There were increased bandemia.  RBC morphology benign, except few target cells.      IMAGING:  FL Retrograde Pyelogram In OR  Fluoroscopic retrograde pyelogram     Clinical: Ureteral calculus     FT 42 seconds   mGy 4.8   5 images     FINDINGS: Initial image demonstrates a guidewire within the left renal  collecting system. A catheter was introduced and the tip manipulated to  the pelvis. Partial retrograde filling with contrast material  demonstrates a filling defect at the UPJ. This corresponds to the stone  seen on prior CT. Double J ureteral stent was placed and the tip  manipulated into one of the upper pole calyces. Position is  satisfactory.     This report was finalized on 7/1/2025 6:36 PM by Dr. Kyle Malhotra M.D  on Workstation: [id number]     CT Abdomen Pelvis Without Contrast  Narrative: CT ABDOMEN PELVIS WO CONTRAST-     DATE OF EXAM: 7/1/2025 1:41 PM     INDICATION: vomiting, diarrhea, WBC 23, hypotensive.     COMPARISON: Prior renal ultrasound 9/18/2023.     TECHNIQUE: Multiple contiguous axial images were acquired through the  abdomen and pelvis without the intravenous administration of contrast.  Reformatted coronal and sagittal sequences were also reviewed. Radiation  dose reduction techniques were utilized, including automated exposure  control and exposure modulation based on body size.     FINDINGS:  Motion artifact limits evaluation.     Partially imaged small left pleural effusion and trace right pleural  fluid with multifocal bibasilar subsegmental atelectasis and/or  scarring. Patchy dependent groundglass opacities in the base of each  lower lobe could reflect superimposed pneumonia. Bilateral lower lobe  bronchial plugging. Partially imaged severe calcified coronary artery  disease and aortic valve calcifications. Trace pericardial fluid.      Status post cholecystectomy. Mildly heterogeneous hepatic attenuation  with subtle nodular liver contours, which could reflect underlying  hepatocellular disease/cirrhosis. Recanalization of the periumbilical  vein. Mild splenomegaly with the spleen measuring 14.5 cm in diameter.  The pancreas and adrenal glands are unremarkable in limited noncontrast  CT appearance. Incompletely evaluated 1.5 cm slightly high attenuation  exophytic lesion at the upper pole of the right kidney, probable benign  hemorrhagic or proteinaceous cyst. Large 7 mm x 21 mm stone in the left  renal pelvis with mild left pelviectasis and mild left peripelvic and  perinephric fat stranding, likely intermittently obstructing. Additional  nonobstructing 3 to 4 mm stones in the left kidney. No ureteral stone is  identified. The urinary bladder is nondistended. Mild urinary bladder  wall thickening and patchy perivesical fat stranding, which could be  accentuated by under distention. Status post hysterectomy. The adnexa  are not definitively identified.     Mild diffuse gastric wall thickening could be accentuated by under  distention but could also reflect a nonspecific gastritis. Short segment  of distal ileum in the upper right hemipelvis with mild bowel wall  thickening suggesting a nonspecific segmental enteritis. Mild colorectal  stool. Extensive colonic diverticula, without specific CT evidence for  acute diverticulitis. No bowel obstruction. The appendix is normal.     Trace perihepatic free fluid and trace free fluid in the pelvis. No free  intraperitoneal air. No pathologically enlarged lymph nodes in the  abdomen or pelvis. Moderate to severe calcified atherosclerotic disease  in the abdominal aorta and its branches without aneurysm.     Mild diffuse anasarca. Diffuse osteopenia. Mild to moderate multilevel  lumbar spondylosis with mild likely chronic/degenerative grade 1  anterolisthesis of L3 on L4 and L4 on L5. Partially imaged  right MARQUEZ  with associated hardware artifact. No evidence of hardware  complications. Mild to moderate left hip joint DJD. No acute osseous  abnormality or concerning osseous lesion. The upper extremities are  partially included in the field-of-view but are not adequately  evaluated.     Impression:    1. Large 7 mm x 21 mm stone in the left renal pelvis with mild left  pelviectasis and mild left peripelvic and perinephric fat stranding,  suggesting possible intermediate obstruction and possible urinary tract  infection. Additional nonobstructing stones in the left kidney.  2. Urinary bladder wall thickening and mild perivesical fat stranding,  which could be accentuated by underdistention but could reflect a  nonspecific cystitis. Recommend correlation with urinalysis.  3. Mild diffuse gastric wall thickening could be accentuated by  underdistention but could reflect a nonspecific gastritis.  4. Short segment of distal ileal wall thickening, suggesting a  nonspecific segmental enteritis.  5. Mildly heterogeneous hepatic attenuation with subtle nodular liver  contours, which could reflect underlying hepatocellular  disease/cirrhosis, with evidence of portal venous hypertension including  splenomegaly, recanalization of the periumbilical vein, and trace  ascites.  6. Partially imaged small left pleural effusion and trace right pleural  fluid with multifocal bibasilar subsegmental atelectasis and/are  scarring and patchy groundglass opacities in the dependent base of each  lower lobe but could reflect superimposed pneumonia.     This report was finalized on 7/1/2025 2:53 PM by Rodrigo Alejandre MD on  Workstation: [id number]         Assessment & Plan     *.  Sepsis with E. coli bacteremia with leukocytosis.  Patient was hypotensive, and acute renal injury, and altered mental status with lethargic.  Patient has been given IV hydration and IV antibiotics.  Her condition has significant proved according to her son at the  bedside.  Patient herself also reports feeling better.    *.  Left nephrolithiasis, status post cystoscope with stent placement on 7/1/2025.  There was a purulent material from the bladder according to procedure note.     *.  Severe thrombocytopenia on top of chronic thrombocytopenia.  Looking back this patient has low platelets starting at least 2015.   Her son reports a few weeks ago laboratory studies showed thrombocytopenia.  I think this is from [facility] with lab study on 6/11/2025 reporting platelets 90,000.  Per records her platelet count was only 73,000 on 6/9/2025.  I think her chronic thrombocytopenia is related to liver cirrhosis and splenomegaly as evidenced on the CT scan of the abdomen from 7/1/2025.  Her son reports they do not know this information before this admission.   Currently this patient has severe thrombocytopenia on top of chronic thrombocytopenia , which is likely due to thesepsis and acute infection.  Last night platelets was 22,000 however this new laboratory studies showed slightly improved and platelets 30,000 but still with elevated IPF 15.1%.  I do not think this patient has ITP.  Elevated IPF simply indicates this patient is still having some bone marrow response to produce platelets.  There is no need for steroids or transfusion.      *.  Chronic anemia with evidence of iron deficiency.    This is evidenced by her laboratory studies from [facility] on 6/9/2025 reporting ferritin 40, and the iron saturation was only 5%.  Her baseline hemoglobin was about 10.7.  Currently worsening anemia is due to her infection.  Her iron studies 7/1/2025 reported higher level of ferritin however low iron saturation.  This fits with inflammatory conditions.  I explained to patient's son at the bedside, there is no hurry to start the patient on oral iron treatment at this point since she is pretty sick from the sepsis.  When she improves, we could consider starting oral iron treatment  because it could have caused nausea constipation.  Her son reports patient already has intermittent constipation and diarrhea.    *.  Folate deficiency.  Laboratory studies today 7/2/2025 reported folate 4.24 ng/mL and normal B12 842 pg/mL.  I recommended starting folic acid 1 mg daily to help with correction and erythropoiesis.      PLAN:  Continue IV antibiotics Rocephin.  Pending drug sensitivity study.  Management per primary team.  Start the patient on folic acid 1 mg daily.  No need for platelet transfusion.  Check stool for occult blood.  Monitor CBC in the morning.  If platelets deteriorating less than 10,000, will transfuse 1 unit of platelets.  When patient improves further, could consider starting oral iron, or could start as outpatient.    Will follow-up.      I elevated her peripheral blood smear today in conjunction with reviewing all the laboratory results.    Discussed with the patient and mostly with her son at the bedside.     I also spoke with nursing staff.    I spent 78 minutes caring for Agueda on this date of service. This time includes time spent by me in the following activities: preparing for the visit, reviewing tests, obtaining and/or reviewing a separately obtained history, performing a medically appropriate examination and/or evaluation, counseling and educating the patient/family/caregiver, ordering medications, tests, or procedures, referring and communicating with other health care professionals, documenting information in the medical record, independently interpreting results and communicating that information with the patient/family/caregiver and care coordination     Thanks for letting us participate in the care of this patient!       CONCHIS LIVINGSTON M.D., Ph.D.

## 2025-07-02 NOTE — CASE MANAGEMENT/SOCIAL WORK
Discharge Planning Assessment  [facility]     Patient Name: Agueda Krishna  MRN: [id number]  Today's Date: 7/2/2025    Admit Date: 7/1/2025    Plan: Plan skilled care at accepting facility.    BENTLEY Kirkpatrick RN   Discharge Needs Assessment       Row Name 07/02/25 0746       Living Environment    People in Home alone    Current Living Arrangements home    Potentially Unsafe Housing Conditions none    In the past 12 months has the electric, gas, oil, or water company threatened to shut off services in your home? No    Primary Care Provided by self    Provides Primary Care For no one, unable/limited ability to care for self    Family Caregiver if Needed child(sylvain), adult    Family Caregiver Names Son  ( Epifanio Krishna [phone]) , son  ( Aidan Krishna [phone]) and daughter ( Vania Krishna [phone])    Quality of Family Relationships involved;helpful;supportive    Able to Return to Prior Arrangements yes    Living Arrangement Comments Pt lives alone in a single story house.       Resource/Environmental Concerns    Resource/Environmental Concerns none    Transportation Concerns none       Transportation Needs    In the past 12 months, has lack of transportation kept you from medical appointments or from getting medications? no    In the past 12 months, has lack of transportation kept you from meetings, work, or from getting things needed for daily living? No       Food Insecurity    Within the past 12 months, you worried that your food would run out before you got the money to buy more. Never true    Within the past 12 months, the food you bought just didn't last and you didn't have money to get more. Never true       Transition Planning    Patient/Family Anticipates Transition to inpatient rehabilitation facility    Patient/Family Anticipated Services at Transition none    Transportation Anticipated family or friend will provide;health plan transportation       Discharge Needs Assessment    Readmission  Within the Last 30 Days no previous admission in last 30 days    Equipment Currently Used at Home bath bench;cane, straight;grab bar;rollator;wheelchair;commode    Concerns to be Addressed basic needs    Anticipated Changes Related to Illness none    Equipment Needed After Discharge bath bench;cane, straight;commode;grab bar, tub/shower;grab bar, toilet;rollator;wheelchair, manual    Discharge Facility/Level of Care Needs nursing facility, skilled                   Discharge Plan       Row Name 07/02/25 0749       Plan    Plan Plan skilled care at accepting facility.    BENTLEY Kirkpatrick RN    Patient/Family in Agreement with Plan yes    Plan Comments FACE SHEET VERIFIED/ IM LETTER SIGNED.  Spoke with pt's son (Epifanio) at bedside.   Pt's PCP is JOSESITO Palomo.  Pt lives alone in a single story house.  Pt is independent with ADLs.  Pt has a bath bench, cane, BSC, grab bars, rolator, and transport chair for home use if needed.   Pt gets her prescriptions at [facility] on [address].  Pt does not have issues affording medications. Pt is not current with .  Pt has been in a SNF 20 years ago.  Pt's son requesting a referral to [facility].  Pt's son states he has had other family members at [facility] and that is his facility of choice. Samira  ( [phone]) called to follow.  Pt's son  ( Epifanio Krishna [phone]) , son ( Aidan Krishna [phone]) and daughter ( Vania Krishna [phone]) will assist pt when she returns home.  Plan skilled care at accepting facility.   BENTLEY Kirkpatrick RN                  Continued Care and Services - Admitted Since 7/1/2025       Destination       Service Provider Request Status Services Address Phone Fax Patient Preferred    [facility] Pending - Request Sent -- [address] [address] [phone] --                  Expected Discharge Date and Time       Expected Discharge Date Expected Discharge Time    Jul 7, 2025             Demographic Summary       Row Name 07/02/25 0746       General Information    Admission Type inpatient    Arrived From PACU/recovery room    Required Notices Provided Important Message from Medicare    Referral Source admission list    Reason for Consult discharge planning    Preferred Language English                   Functional Status       Row Name 07/02/25 0746       Functional Status    Usual Activity Tolerance moderate    Current Activity Tolerance fair       Functional Status, IADL    Medications independent    Meal Preparation independent    Housekeeping independent    Laundry independent    Shopping assistive person       Mental Status    General Appearance WDL WDL                   Psychosocial    No documentation.                  Abuse/Neglect    No documentation.                  Legal    No documentation.                  Substance Abuse    No documentation.                  Patient Forms    No documentation.                     Melissa Kirkpatrick RN

## 2025-07-02 NOTE — THERAPY EVALUATION
Patient Name: Agueda Krishna  : 10/20/1925    MRN: [id number]                              Today's Date: 2025       Admit Date: 2025    Visit Dx:     ICD-10-CM ICD-9-CM   1. Left ureteral calculus  N20.1 592.1   2. Hypovolemic shock  R57.1 785.59   3. Vomiting and diarrhea  R11.10 787.03    R19.7 787.91   4. Anemia, unspecified type  D64.9 285.9   5. Thrombocytopenia  D69.6 287.5   6. Leukocytosis, unspecified type  D72.829 288.60   7. Lactic acidosis  E87.20 276.2   8. Acute kidney injury  N17.9 584.9     Patient Active Problem List   Diagnosis    Hypovolemic shock    Acute kidney injury    Left ureteral calculus     Past Medical History:   Diagnosis Date    History of stroke 2009    Hyperlipidemia     Hypertension     Hypothyroid      Past Surgical History:   Procedure Laterality Date    CATARACT EXTRACTION Bilateral     HYSTERECTOMY      REPLACEMENT TOTAL KNEE Left     TOTAL HIP ARTHROPLASTY Right       General Information       Row Name 25 1232          OT Time and Intention    Document Type evaluation  -PP     Mode of Treatment individual therapy;occupational therapy  -PP       Row Name 25 1232          General Information    Patient Profile Reviewed yes  -PP     Existing Precautions/Restrictions fall  -PP     Barriers to Rehab visual deficit;hearing deficit;cognitive status;medically complex  Legally blind and [place], pt greets OT but conversation very minimal  -PP       Row Name 25 1232          Living Environment    Current Living Arrangements home  -PP     People in Home alone  -PP       Row Name 25 1232          Cognition    Orientation Status (Cognition) oriented to;person  aphasic, but follows commands  -PP       Row Name 25 1232          Safety Issues/Impairments Affecting Functional Mobility    Impairments Affecting Function (Mobility) balance;strength;cognition;endurance/activity tolerance  -PP     Comment, Safety Issues/Impairments (Mobility) gait belt and  non skid socks worn for safety  -PP               User Key  (r) = Recorded By, (t) = Taken By, (c) = Cosigned By      Initials Name Provider Type    PP Qiana James OT Occupational Therapist                     Mobility/ADL's       Row Name 07/02/25 1301          Bed Mobility    Bed Mobility supine-sit;sit-supine  -PP     Supine-Sit [place] (Bed Mobility) contact guard  -PP     Sit-Supine [place] (Bed Mobility) not tested  -PP     Assistive Device (Bed Mobility) bed rails  -PP     Comment, (Bed Mobility) Increased time req as pt scooted to EOB using BUE to pull and weight shifting  -PP       Row Name 07/02/25 1301          Transfers    Transfers stand-sit transfer;sit-stand transfer  -PP       Row Name 07/02/25 130          Sit-Stand Transfer    Sit-Stand [place] (Transfers) contact guard;minimum assist (75% patient effort)  -PP     Assistive Device (Sit-Stand Transfers) other (see comments)  HHA  -PP       Row Name 07/02/25 1301          Functional Mobility    Functional Mobility- Ind. Level minimum assist (75% patient effort)  -PP     Functional Mobility- Device other (see comments)  HHA  -PP     Functional Mobility- Comment fxnl mob into hallway with directional cues d/t low vision deficit  -PP       Row Name 07/02/25 1301          Activities of Daily Living    BADL Assessment/Intervention lower body dressing;grooming;toileting;feeding  -PP       Row Name 07/02/25 1301          Lower Body Dressing Assessment/Training    [place] Level (Lower Body Dressing) doff;don;socks;maximum assist (25% patient effort)  -       Row Name 07/02/25 1301          Grooming Assessment/Training    Comment, (Grooming) Likely reqs some assist d/t low vision but family reports (I) at BL, supplies left in room for pt to complete later on  -PP       Row Name 07/02/25 1301          Toileting Assessment/Training    Comment, (Toileting) Pt able to ambulate to BR for BM if needed with A x1. FC present  -PP        Row Name 07/02/25 1301          Self-Feeding Assessment/Training    [place] Level (Feeding) feeding skills;set up  -PP               User Key  (r) = Recorded By, (t) = Taken By, (c) = Cosigned By      Initials Name Provider Type    PP Qiana James, RONNY Occupational Therapist                   Obj/Interventions       Row Name 07/02/25 1307          Vision Assessment/Intervention    Visual Impairment/Limitations WFL  -PP       Row Name 07/02/25 1307          Range of Motion Comprehensive    General Range of Motion bilateral upper extremity ROM WFL  -PP     Comment, General Range of Motion Pt BUE AROM observed to be WFL during fxnl tasks  -PP       Row Name 07/02/25 1307          Strength Comprehensive (MMT)    General Manual Muscle Testing (MMT) Assessment upper extremity strength deficits identified;other (see comments)  -PP     Comment, General Manual Muscle Testing (MMT) Assessment Pt has some gen age associated weakness noted but WFL  -PP       Row Name 07/02/25 1307          Motor Skills    Motor Skills functional endurance  -PP     Functional Endurance fair  -PP       Row Name 07/02/25 1307          Balance    Balance Assessment sitting static balance;standing static balance;sitting dynamic balance;standing dynamic balance  -PP     Static Sitting Balance supervision  -PP     Dynamic Sitting Balance contact guard  -PP     Position, Sitting Balance sitting edge of bed  -PP     Static Standing Balance contact guard;minimal assist  -PP     Dynamic Standing Balance minimal assist  -PP     Position/Device Used, Standing Balance walker, front-wheeled  -PP     Balance Interventions sitting;standing  -PP               User Key  (r) = Recorded By, (t) = Taken By, (c) = Cosigned By      Initials Name Provider Type    PP Qiana James, OT Occupational Therapist                   Goals/Plan    No documentation.                  Clinical Impression       Row Name 07/02/25 1309          Pain Assessment     Pre/Posttreatment Pain Comment Difficult to assess but no visible s/s of pain observed at this time  -PP       Row Name 07/02/25 1309          Plan of Care Review    Plan of Care Reviewed With patient;daughter;son  -PP     Progress no change  -PP     Outcome Evaluation Pt is a 89 y.o.female admitted 2/2 acute weakness, found to have hypovolemic shock. S/p cystoscopy, retrograde pyelogram, and Left stent insertion. PMHx includes CVA, HTN, HLD. Pt presents pleasanlty aphasic with minimal conversation, oriented to self, quite [place] and legally blind. Pt's family reports, pt lives alone, in a SSH with 1-2STE. PLOF: IND with I/ADLs and no AD typically for functional mobility but ocassionally uses STC or furniture. SPV for safety w/ showers. Today pt was able to complete bed mob with CGA-Kaela req increased time to scoot to EOB. Ax1 for STS xfer/ mob household distance to hallway and chair with directional cueing. BUE ROM WFL. Pt left UIC with family present. Pt currently demonstrating impaired balance, cognition, endurance, functional mobility, and safety, indicating the need for OT. Pt at this time anticipated to d/c to a SNF/TINO or home with 24/7 assistance/supervision pending prog.  -PP       Row Name 07/02/25 1301          Therapy Assessment/Plan (OT)    Rehab Potential (OT) good  -PP     Criteria for Skilled Therapeutic Interventions Met (OT) yes;skilled treatment is necessary  -PP     Therapy Frequency (OT) 3 times/wk  -PP       Row Name 07/02/25 1305          Therapy Plan Review/Discharge Plan (OT)    Anticipated Discharge Disposition (OT) skilled nursing facility;home with 24/7 care;home with home health  -PP       Row Name 07/02/25 1309          Vital Signs    O2 Delivery Pre Treatment room air  -PP       Row Name 07/02/25 1309          Positioning and Restraints    Pre-Treatment Position in bed  -PP     Post Treatment Position chair  -PP     In Chair notified nsg;reclined;encouraged to call for assist;exit alarm  on;call light within reach;with family/caregiver  -PP               User Key  (r) = Recorded By, (t) = Taken By, (c) = Cosigned By      Initials Name Provider Type    Qiana Chen OT Occupational Therapist                   Outcome Measures       Row Name 07/02/25 1321          How much help from another is currently needed...    Putting on and taking off regular lower body clothing? 1  -PP     Bathing (including washing, rinsing, and drying) 2  -PP     Toileting (which includes using toilet bed pan or urinal) 2  -PP     Putting on and taking off regular upper body clothing 2  -PP     Taking care of personal grooming (such as brushing teeth) 3  -PP     Eating meals 3  -PP     AM-PAC 6 Clicks Score (OT) 13  -PP       Row Name 07/02/25 0756          How much help from another person do you currently need...    Turning from your back to your side while in flat bed without using bedrails? 4  -MS     Moving from lying on back to sitting on the side of a flat bed without bedrails? 3  -MS     Moving to and from a bed to a chair (including a wheelchair)? 3  -MS     Standing up from a chair using your arms (e.g., wheelchair, bedside chair)? 3  -MS     Climbing 3-5 steps with a railing? 2  -MS     To walk in hospital room? 3  -MS     AM-PAC 6 Clicks Score (PT) 18  -MS       Row Name 07/02/25 1323          Modified [place] Scale    Modified [place] Scale 4 - Moderately severe disability.  Unable to walk without assistance, and unable to attend to own bodily needs without assistance.  -PP       Row Name 07/02/25 1323 07/02/25 1321       Functional Assessment    Outcome Measure Options Modified [place]  -PP AM-PAC 6 Clicks Daily Activity (OT)  -PP              User Key  (r) = Recorded By, (t) = Taken By, (c) = Cosigned By      Initials Name Provider Type    Forest Mireles, RN Registered Nurse    Qiana Chen OT Occupational Therapist                    Occupational Therapy Education       Title: PT OT SLP  Therapies (In Progress)       Topic: Occupational Therapy (In Progress)       Point: ADL training (Done)       Learning Progress Summary            Patient Acceptance, E, VU by PP at 7/2/2025 1324    Comment: Pt Ed on OT role and dc planning.                                      User Key       Initials Effective Dates Name Provider Type Discipline    PP 06/09/23 -  Qiana James OT Occupational Therapist OT                  OT Recommendation and Plan  Therapy Frequency (OT): 3 times/wk  Plan of Care Review  Plan of Care Reviewed With: patient, daughter, son  Progress: no change  Outcome Evaluation: Pt is a 89 y.o.female admitted 2/2 acute weakness, found to have hypovolemic shock. S/p cystoscopy, retrograde pyelogram, and Left stent insertion. PMHx includes CVA, HTN, HLD. Pt presents pleasanlty aphasic with minimal conversation, oriented to self, quite [place] and legally blind. Pt's family reports, pt lives alone, in a SSH with 1-2STE. PLOF: IND with I/ADLs and no AD typically for functional mobility but ocassionally uses STC or furniture. SPV for safety w/ showers. Today pt was able to complete bed mob with CGA-Kaela req increased time to scoot to EOB. Ax1 for STS xfer/ mob household distance to hallway and chair with directional cueing. BUE ROM WFL. Pt left UIC with family present. Pt currently demonstrating impaired balance, cognition, endurance, functional mobility, and safety, indicating the need for OT. Pt at this time anticipated to d/c to a SNF/TINO or home with 24/7 assistance/supervision pending prog.     Time Calculation:   Evaluation Complexity (OT)  Review Occupational Profile/Medical/Therapy History Complexity: expanded/moderate complexity  Assessment, Occupational Performance/Identification of Deficit Complexity: 3-5 performance deficits  Clinical Decision Making Complexity (OT): detailed assessment/moderate complexity  Overall Complexity of Evaluation (OT): moderate complexity     Time Calculation-  OT       Row Name 07/02/25 1224             Time Calculation- OT    OT Start Time 0958  -PP      OT Stop Time 1014  -PP      OT Time Calculation (min) 16 min  -PP      Total Timed Code Minutes- OT 8 minute(s)  -PP      OT Received On 07/02/25  -PP      OT - Next Appointment 07/04/25  -PP      OT Goal Re-Cert Due Date 07/16/25  -PP         Timed Charges    19983 - OT Therapeutic Activity Minutes 8  -PP         Untimed Charges    OT Eval/Re-eval Minutes 8  -PP         Total Minutes    Timed Charges Total Minutes 8  -PP      Untimed Charges Total Minutes 8  -PP       Total Minutes 16  -PP                User Key  (r) = Recorded By, (t) = Taken By, (c) = Cosigned By      Initials Name Provider Type    PP Qiana James, OT Occupational Therapist                  Therapy Charges for Today       Code Description Service Date Service Provider Modifiers Qty    [id number]  OT THERAPEUTIC ACT EA 15 MIN 7/2/2025 Qiana James, OT GO 1    85994424930 HC OT EVAL MOD COMPLEXITY 3 7/2/2025 JacobDamaris lovettia, OT GO 1                 Porshia Jacob, OT  7/2/2025

## 2025-07-02 NOTE — PLAN OF CARE
Goal Outcome Evaluation:  Plan of Care Reviewed With: patient        Progress: no change  Outcome Evaluation: calm at times, became aggressive with 0400 B/P, unable to obtain b/p, NSR on the monitor, VSS, f/c patent, urine blood tinge which has lightened since arrival from PACU, bruises note on both legs, no open areas noted, not able to swallow meds, choked on sip of water, will monitor, SLP consulted, Eyes closed at long interval, resting quietly in room with eyes closed inbetween care

## 2025-07-02 NOTE — PLAN OF CARE
Problem: Adult Inpatient Plan of Care  Goal: Plan of Care Review  Outcome: Progressing  Flowsheets (Taken 7/2/2025 1702)  Progress: improving  Outcome Evaluation: Patient has been cooperative during shift. Unsure of orientation, AOx?. Room air, SR, assist x1, Ambulated with OT to hallway and up in chair. Family refused PT. SLP recommends [place] thickened but son would like for her to have thin liquids at this time. ID consulted. Rocephin started. UA and OB needed. No complaints of pain, nausea or SOA. Will continue to monitor and assist patient as needed.  Plan of Care Reviewed With: patient  Goal: Patient-Specific Goal (Individualized)  Outcome: Progressing  Goal: Absence of Hospital-Acquired Illness or Injury  Outcome: Progressing  Intervention: Identify and Manage Fall Risk  Recent Flowsheet Documentation  Taken 7/2/2025 1600 by Forest Rae RN  Safety Promotion/Fall Prevention:   assistive device/personal items within reach   fall prevention program maintained   nonskid shoes/slippers when out of bed   safety round/check completed  Taken 7/2/2025 1330 by Forest Rae RN  Safety Promotion/Fall Prevention:   assistive device/personal items within reach   fall prevention program maintained   nonskid shoes/slippers when out of bed   safety round/check completed  Taken 7/2/2025 1200 by Forest Rae RN  Safety Promotion/Fall Prevention:   assistive device/personal items within reach   fall prevention program maintained   safety round/check completed   nonskid shoes/slippers when out of bed  Taken 7/2/2025 1000 by Forest Rae RN  Safety Promotion/Fall Prevention:   assistive device/personal items within reach   fall prevention program maintained   nonskid shoes/slippers when out of bed   safety round/check completed  Taken 7/2/2025 0756 by Forest Rae RN  Safety Promotion/Fall Prevention:   assistive device/personal items within reach   fall prevention program maintained   nonskid  shoes/slippers when out of bed   safety round/check completed  Intervention: Prevent Skin Injury  Recent Flowsheet Documentation  Taken 7/2/2025 1600 by Forest Rae RN  Body Position: supine  Taken 7/2/2025 1330 by Forest Rae RN  Body Position: supine  Skin Protection: incontinence pads utilized  Taken 7/2/2025 1200 by Forest Rae RN  Body Position: supine  Taken 7/2/2025 1000 by Forest Rae RN  Body Position: (chair) other (see comments)  Taken 7/2/2025 0756 by Forest Rae RN  Body Position: supine  Skin Protection: incontinence pads utilized  Goal: Optimal Comfort and Wellbeing  Outcome: Progressing  Goal: Readiness for Transition of Care  Outcome: Progressing     Problem: Sepsis/Septic Shock  Goal: Optimal Coping  Outcome: Progressing  Goal: Absence of Bleeding  Outcome: Progressing  Goal: Blood Glucose Level Within Target Range  Outcome: Progressing  Goal: Absence of Infection Signs and Symptoms  Outcome: Progressing  Intervention: Promote Recovery  Recent Flowsheet Documentation  Taken 7/2/2025 1000 by Forest Rae RN  Activity Management: up in chair  Goal: Optimal Nutrition Delivery  Outcome: Progressing     Problem: Comorbidity Management  Goal: Blood Pressure in Desired Range  Outcome: Progressing     Problem: Fall Injury Risk  Goal: Absence of Fall and Fall-Related Injury  Outcome: Progressing  Intervention: Promote Injury-Free Environment  Recent Flowsheet Documentation  Taken 7/2/2025 1600 by Forest Rae RN  Safety Promotion/Fall Prevention:   assistive device/personal items within reach   fall prevention program maintained   nonskid shoes/slippers when out of bed   safety round/check completed  Taken 7/2/2025 1330 by Forest Rae RN  Safety Promotion/Fall Prevention:   assistive device/personal items within reach   fall prevention program maintained   nonskid shoes/slippers when out of bed   safety round/check completed  Taken 7/2/2025 1200 by Butch  ROMARIO Garg  Safety Promotion/Fall Prevention:   assistive device/personal items within [facility]   fall prevention program maintained   safety round/check completed   nonskid shoes/slippers when out of bed  Taken 7/2/2025 1000 by Forest Rae RN  Safety Promotion/Fall Prevention:   assistive device/personal items within [facility]   fall prevention program maintained   nonskid shoes/slippers when out of bed   safety round/check completed  Taken 7/2/2025 0756 by Forest Rae RN  Safety Promotion/Fall Prevention:   assistive device/personal items within [facility]   fall prevention program maintained   nonskid shoes/slippers when out of bed   safety round/check completed     Problem: Skin Injury Risk Increased  Goal: Skin Health and Integrity  Outcome: Progressing  Intervention: Optimize Skin Protection  Recent Flowsheet Documentation  Taken 7/2/2025 1600 by Forest Rae RN  Head of Bed (HOB) Positioning: HOB at 30 degrees  Taken 7/2/2025 1330 by Forest Rae RN  Pressure Reduction Techniques:   frequent weight shift encouraged   weight shift assistance provided  Head of Bed (HOB) Positioning: HOB at 30 degrees  Pressure Reduction Devices: pressure-redistributing mattress utilized  Skin Protection: incontinence pads utilized  Taken 7/2/2025 1200 by Forest Rae RN  Head of Bed (HOB) Positioning: HOB at 60 degrees  Taken 7/2/2025 1000 by Forest Rae RN  Activity Management: up in chair  Taken 7/2/2025 0756 by Forest Rae RN  Pressure Reduction Techniques:   frequent weight shift encouraged   weight shift assistance provided  Head of Bed (HOB) Positioning: HOB at 45 degrees  Pressure Reduction Devices: pressure-redistributing mattress utilized  Skin Protection: incontinence pads utilized     Problem: UTI (Urinary Tract Infection)  Goal: Improved Infection Symptoms  Outcome: Progressing   Goal Outcome Evaluation:  Plan of Care Reviewed With: patient        Progress: improving  Outcome Evaluation:  Patient has been cooperative during shift. Unsure of orientation, AOx?. Room air, SR, assist x1, Ambulated with OT to hallway and up in chair. Family refused PT. SLP recommends [place] thickened but son would like for her to have thin liquids at this time. ID consulted. Rocephin started. UA and OB needed. No complaints of pain, nausea or SOA. Will continue to monitor and assist patient as needed.

## 2025-07-02 NOTE — CASE MANAGEMENT/SOCIAL WORK
Continued Stay Note  [facility]     Patient Name: Agueda Krishna  MRN: [id number]  Today's Date: 7/2/2025    Admit Date: 7/1/2025    Plan: Plan skilled care at accepting facility.    BENTLEY Kirkpatrick RN   Discharge Plan       Row Name 07/02/25 0749       Plan    Plan Plan skilled care at accepting facility.    BENTLEY Kirkpatrick RN    Patient/Family in Agreement with Plan yes    Plan Comments FACE SHEET VERIFIED/ IM LETTER SIGNED.  Spoke with pt's son (Epifanio) at bedside.   Pt's PCP is JOSESITO Palomo.  Pt lives alone in a single story house.  Pt is independent with ADLs.  Pt has a bath bench, cane, BSC, grab bars, rolator, and transport chair for home use if needed.   Pt gets her prescriptions at [facility] on [address].  Pt does not have issues affording medications. Pt is not current with .  Pt has been in a SNF 20 years ago.  Pt's son requesting a referral to [facility].  Pt's son states he has had other family members at [facility] and that is his facility of choice. Samira  ( [phone]) called to follow.  Pt's son  ( Epifanio Krishna [phone]) , son ( Aidan Krishna [phone]) and daughter ( Vania Krishna [phone]) will assist pt when she returns home.  Plan skilled care at accepting facility.   BENTLEY Kirkpatrick RN                   Discharge Codes    No documentation.                 Expected Discharge Date and Time       Expected Discharge Date Expected Discharge Time    Jul 7, 2025               Melissa Kirkpatrick RN

## 2025-07-03 LAB
ALBUMIN SERPL-MCNC: 2.4 G/DL (ref 3.5–5.2)
ALBUMIN/GLOB SERPL: 1 G/DL
ALP SERPL-CCNC: 86 U/L (ref 39–117)
ALT SERPL W P-5'-P-CCNC: 14 U/L (ref 1–33)
ANION GAP SERPL CALCULATED.3IONS-SCNC: 11.3 MMOL/L (ref 5–15)
AST SERPL-CCNC: 31 U/L (ref 1–32)
BILIRUB SERPL-MCNC: <0.2 MG/DL (ref 0–1.2)
BUN SERPL-MCNC: 56 MG/DL (ref 8–23)
BUN/CREAT SERPL: 17.9 (ref 7–25)
CA-I SERPL ISE-MCNC: 1.1 MMOL/L (ref 1.15–1.35)
CALCIUM SPEC-SCNC: 7.5 MG/DL (ref 8.2–9.6)
CHLORIDE SERPL-SCNC: 113 MMOL/L (ref 98–107)
CO2 SERPL-SCNC: 19.7 MMOL/L (ref 22–29)
CREAT SERPL-MCNC: 3.13 MG/DL (ref 0.57–1)
DEPRECATED RDW RBC AUTO: 47.4 FL (ref 37–54)
EGFRCR SERPLBLD CKD-EPI 2021: 12.9 ML/MIN/1.73
ERYTHROCYTE [DISTWIDTH] IN BLOOD BY AUTOMATED COUNT: 14.6 % (ref 12.3–15.4)
GLOBULIN UR ELPH-MCNC: 2.3 GM/DL
GLUCOSE SERPL-MCNC: 129 MG/DL (ref 65–99)
HCT VFR BLD AUTO: 26.7 % (ref 34–46.6)
HEMOCCULT STL QL: POSITIVE
HGB BLD-MCNC: 8.1 G/DL (ref 12–15.9)
MCH RBC QN AUTO: 27.1 PG (ref 26.6–33)
MCHC RBC AUTO-ENTMCNC: 30.3 G/DL (ref 31.5–35.7)
MCV RBC AUTO: 89.3 FL (ref 79–97)
PLATELET # BLD AUTO: 35 10*3/MM3 (ref 140–450)
POTASSIUM SERPL-SCNC: 4.3 MMOL/L (ref 3.5–5.2)
PROT SERPL-MCNC: 4.7 G/DL (ref 6–8.5)
RBC # BLD AUTO: 2.99 10*6/MM3 (ref 3.77–5.28)
SODIUM SERPL-SCNC: 144 MMOL/L (ref 136–145)
WBC NRBC COR # BLD AUTO: 18.94 10*3/MM3 (ref 3.4–10.8)

## 2025-07-03 PROCEDURE — 82330 ASSAY OF CALCIUM: CPT | Performed by: HOSPITALIST

## 2025-07-03 PROCEDURE — 99233 SBSQ HOSP IP/OBS HIGH 50: CPT | Performed by: INTERNAL MEDICINE

## 2025-07-03 PROCEDURE — 97162 PT EVAL MOD COMPLEX 30 MIN: CPT | Performed by: PHYSICAL THERAPIST

## 2025-07-03 PROCEDURE — 25010000002 CEFTRIAXONE PER 250 MG: Performed by: INTERNAL MEDICINE

## 2025-07-03 PROCEDURE — 25810000003 SODIUM CHLORIDE 0.9 % SOLUTION: Performed by: HOSPITALIST

## 2025-07-03 PROCEDURE — 85027 COMPLETE CBC AUTOMATED: CPT | Performed by: HOSPITALIST

## 2025-07-03 PROCEDURE — 97530 THERAPEUTIC ACTIVITIES: CPT | Performed by: PHYSICAL THERAPIST

## 2025-07-03 PROCEDURE — 80053 COMPREHEN METABOLIC PANEL: CPT | Performed by: HOSPITALIST

## 2025-07-03 PROCEDURE — G0545 PR INHERENT VISIT TO INPT: HCPCS | Performed by: INTERNAL MEDICINE

## 2025-07-03 RX ADMIN — Medication 5000 UNITS: at 07:23

## 2025-07-03 RX ADMIN — METOPROLOL TARTRATE 12.5 MG: 25 TABLET, FILM COATED ORAL at 21:02

## 2025-07-03 RX ADMIN — Medication 10 ML: at 21:02

## 2025-07-03 RX ADMIN — Medication 10 ML: at 07:24

## 2025-07-03 RX ADMIN — METOPROLOL TARTRATE 12.5 MG: 25 TABLET, FILM COATED ORAL at 07:22

## 2025-07-03 RX ADMIN — LEVOTHYROXINE SODIUM 75 MCG: 0.07 TABLET ORAL at 06:00

## 2025-07-03 RX ADMIN — CARBIDOPA AND LEVODOPA 1 TABLET: 25; 100 TABLET ORAL at 21:01

## 2025-07-03 RX ADMIN — FOLIC ACID 1 MG: 1 TABLET ORAL at 07:23

## 2025-07-03 RX ADMIN — CARBIDOPA AND LEVODOPA 1 TABLET: 25; 100 TABLET ORAL at 07:23

## 2025-07-03 RX ADMIN — SODIUM BICARBONATE: 84 INJECTION, SOLUTION INTRAVENOUS at 18:48

## 2025-07-03 RX ADMIN — CEFTRIAXONE 2000 MG: 2 INJECTION, POWDER, FOR SOLUTION INTRAMUSCULAR; INTRAVENOUS at 13:09

## 2025-07-03 RX ADMIN — SODIUM CHLORIDE 100 ML/HR: 9 INJECTION, SOLUTION INTRAVENOUS at 03:41

## 2025-07-03 NOTE — PROGRESS NOTES
[facility] Progress Note    Appears well. Eating. montes de oca draining    A&P    L urolithiasis  Sepsis    - cont montes de oca to monitor UOP until Cr improves  - WBC improved to 18  - on rocephin; blood culture + for e coli  - Cr worsening; 3.13; IVF; should hopefully recover soon; low UOP  - will follow

## 2025-07-03 NOTE — PLAN OF CARE
Goal Outcome Evaluation:  Plan of Care Reviewed With: patient        Progress: improving  Outcome Evaluation: more active this shift, bm x 2, loose speciman sent to lab, +OB, urine sent to lab 0 esinophils, can get frustrated with communication issues, has been cooperative, VSS, NSR on the monitor, Eyes closed at short intervals, resting quietly in her room

## 2025-07-03 NOTE — PROGRESS NOTES
LOS: 2 days   Patient Care Team:  Bety Mccloud APRN as PCP - General (Nurse Practitioner)    Chief Complaint: Sepsis with urinary tract infection.  Severe thrombocytopenia.    Interval History:  7/3/2025  Patient is afebrile.  Overall feeling better.  Galarza catheter still in place with cloudy lucinda-colored urine.  Platelets marginally better 35,000.    A comprehensive 14 point review of systems was performed and was negative except as mentioned.    Vital Signs:  Temp:  [97.5 °F (36.4 °C)-97.9 °F (36.6 °C)] 97.9 °F (36.6 °C)  Heart Rate:  [60-72] 72  Resp:  [16-18] 18  BP: (107-131)/(47-59) 107/59    Intake/Output Summary (Last 24 hours) at 7/3/2025 1552  Last data filed at 7/3/2025 0601  Gross per 24 hour   Intake 1255 ml   Output 350 ml   Net 905 ml       Physical Exam:  GENERAL:  Well-developed, well-nourished in no acute distress.    SKIN:  Warm, dry without rashes, purpura or petechiae.  HEENT:  Normocephalic.   LYMPHATICS:  No cervical, supraclavicular or axillary adenopathy.  CHEST: Normal respiratory effort.  Lungs clear to auscultation. Good airflow.  CARDIAC:  Regular rate and rhythm. Normal S1,S2.  ABDOMEN:  Soft, no tender.  Bowel sounds normal.  EXTREMITIES:  No lower extremity edema.      Results Review:   CBC:      Lab 07/03/25  0556 07/02/25  1143 07/01/25 2050 07/01/25  1230   WBC 18.94* 23.10*  --  23.41*   HEMOGLOBIN 8.1* 9.0*  --  9.2*   HEMATOCRIT 26.7* 29.3*  --  30.4*   PLATELETS 35* 30*  30* 22* 36*   NEUTROS ABS  --  22.16*  --  22.24*   EOS ABS  --  0.00  --  0.00   MCV 89.3 89.1  --  91.8     CMP:        Lab 07/03/25  0556 07/02/25  1143 07/01/25  1230   SODIUM 144 141 141   POTASSIUM 4.3 5.1 4.9   CHLORIDE 113* 109* 111*   CO2 19.7* 21.0* 17.0*   ANION GAP 11.3 11.0 13.0   BUN 56.0* 49.0* 42.0*   CREATININE 3.13* 2.90* 2.88*   EGFR 12.9* 14.1* 14.2*   GLUCOSE 129* 207* 60*   CALCIUM 7.5* 7.8* 8.2   IONIZED CALCIUM 1.10* 1.07*  --    MAGNESIUM  --  1.7  --    PHOSPHORUS  --  5.1*  --     TOTAL PROTEIN 4.7* 5.1* 5.2*   ALBUMIN 2.4* 2.5* 2.7*   GLOBULIN 2.3 2.6 2.5   ALT (SGPT) 14 24 44*   AST (SGOT) 31 60* 99*   BILIRUBIN <0.2 0.5 0.6   ALK PHOS 86 104 113   LIPASE  --   --  16     Lab Results   Component Value Date    VSFOLXWY47 842 07/02/2025     Lab Results   Component Value Date    FOLATE 4.24 (L) 07/02/2025     Lab Results   Component Value Date    IRON 14 (L) 07/01/2025    LABIRON 4 (L) 07/01/2025    TRANSFERRIN 250 07/01/2025    TIBC 373 07/01/2025    FERRITIN 154.00 (H) 07/01/2025           Results from last 7 days   Lab Units 07/01/25  1324   INR  1.72*   APTT seconds 35.5*         Results from last 7 days   Lab Units 07/02/25  1143   MAGNESIUM mg/dL 1.7           I reviewed the patient's new clinical results.        Assessment:  *.  Sepsis with E. coli bacteremia with leukocytosis.  Patient was hypotensive, and acute renal injury, and altered mental status with lethargic.  Patient has been given IV hydration and IV antibiotics.  Her condition has significant proved according to her son at the bedside.  Patient herself also reports feeling better.  7/3/2025 patient is afebrile.  Continue IV antibiotics.       *.  Left nephrolithiasis, status post cystoscope with stent placement on 7/1/2025.  There was a purulent material from the bladder according to procedure note.      *.  Severe thrombocytopenia on top of chronic thrombocytopenia.  Looking back this patient has low platelets starting at least 2015.   Her son reports a few weeks ago laboratory studies showed thrombocytopenia.  I think this is from [facility] with lab study on 6/11/2025 reporting platelets 90,000.  Per records her platelet count was only 73,000 on 6/9/2025.  I think her chronic thrombocytopenia is related to liver cirrhosis and splenomegaly as evidenced on the CT scan of the abdomen from 7/1/2025.  Her son reports they do not know this information before this admission.   Currently this patient has severe  thrombocytopenia on top of chronic thrombocytopenia , which is likely due to thesepsis and acute infection.  Last night platelets was 22,000 however this new laboratory studies showed slightly improved and platelets 30,000 but still with elevated IPF 15.1%.  I do not think this patient has ITP.  Elevated IPF simply indicates this patient is still having some bone marrow response to produce platelets.  There is no need for steroids or transfusion.  7/3/2025 platelets 35,000.  Continue to monitor.        *.  Chronic anemia with evidence of iron deficiency.    This is evidenced by her laboratory studies from [facility] on 6/9/2025 reporting ferritin 40, and the iron saturation was only 5%.  Her baseline hemoglobin was about 10.7.  Currently worsening anemia is due to her infection.  7/1/2025 Hb 9.2.  Her iron studies 7/1/2025 reported higher level of ferritin however low iron saturation.  This fits with inflammatory conditions.    7/2/2025 hemoglobin 9.0.  I explained to patient's son at the bedside, there is no hurry to start the patient on oral iron treatment at this point since she is pretty sick from the sepsis.  When she improves, we could consider starting oral iron treatment because it could have caused nausea constipation.  Her son reports patient already has intermittent constipation and diarrhea.  7/3/2025 hemoglobin 8.1.     *.  Folate deficiency.  Laboratory studies today 7/2/2025 reported folate 4.24 ng/mL and normal B12 842 pg/mL.  I recommended starting folic acid 1 mg daily to help with correction and erythropoiesis.        PLAN:  Continue IV antibiotics.  Patient is followed by ID service.  Continue folic acid 1 mg daily.  No need for platelet transfusion.  Monitor CBC in the morning.   When patient improves further, could consider starting oral iron, or could start as outpatient.    Will follow-up.     I discussed with the patient and her son at bedside about laboratory results and further  management plan.  Her son voiced understanding and agreeable.      Alexys Castano MD PhD  07/03/25  15:52 EDT

## 2025-07-03 NOTE — PROGRESS NOTES
Per chart review, patient was followed by Dr. Dinero Daily outpatient for nephrology care. Will direct consult to their group. Thank you.

## 2025-07-03 NOTE — CONSULTS
I was requested to see patient to provide spiritual support.  Patient cannot communicate clearly, but understood that I was the  and that I had come by to pray with her..  Her facial expression showed that she was excited for me to do so.  Her son was in the room, and stated that his mother is Denominational, and that she had received the Anointing of the Sick.  He stated that the  came in on Monday.  I asked her if she wanted for me to pray and she nodded her head and did her best to say yes.  We concluded our time with prayer.

## 2025-07-03 NOTE — PLAN OF CARE
Problem: Adult Inpatient Plan of Care  Goal: Plan of Care Review  Outcome: Progressing  Flowsheets (Taken 7/3/2025 1442)  Progress: improving  Outcome Evaluation: Patient has been cooperative during shift. No complaints of pain, nausea or SOA. Nephrology consulted. Per Urology keep catheter now for worsening renal function and strict I/O. Will continue to monitor and assist patient as needed.  Plan of Care Reviewed With: patient  Goal: Patient-Specific Goal (Individualized)  Outcome: Progressing  Goal: Absence of Hospital-Acquired Illness or Injury  Outcome: Progressing  Intervention: Identify and Manage Fall Risk  Recent Flowsheet Documentation  Taken 7/3/2025 1345 by Forest Rae RN  Safety Promotion/Fall Prevention:   assistive device/personal items within reach   fall prevention program maintained   nonskid shoes/slippers when out of bed   safety round/check completed  Taken 7/3/2025 1000 by Forest Rae RN  Safety Promotion/Fall Prevention:   assistive device/personal items within reach   fall prevention program maintained   nonskid shoes/slippers when out of bed   safety round/check completed  Taken 7/3/2025 0725 by Forest Rae RN  Safety Promotion/Fall Prevention:   assistive device/personal items within reach   fall prevention program maintained   nonskid shoes/slippers when out of bed   safety round/check completed  Intervention: Prevent Skin Injury  Recent Flowsheet Documentation  Taken 7/3/2025 1345 by Forest Rae RN  Body Position: supine  Skin Protection: incontinence pads utilized  Taken 7/3/2025 1000 by Forest Rae RN  Body Position: supine  Taken 7/3/2025 0725 by Forest Rae RN  Body Position: supine  Skin Protection: incontinence pads utilized  Goal: Optimal Comfort and Wellbeing  Outcome: Progressing  Goal: Readiness for Transition of Care  Outcome: Progressing     Problem: Sepsis/Septic Shock  Goal: Optimal Coping  Outcome: Progressing  Goal: Absence of  Bleeding  Outcome: Progressing  Goal: Blood Glucose Level Within Target Range  Outcome: Progressing  Goal: Absence of Infection Signs and Symptoms  Outcome: Progressing  Goal: Optimal Nutrition Delivery  Outcome: Progressing     Problem: Comorbidity Management  Goal: Blood Pressure in Desired Range  Outcome: Progressing     Problem: Fall Injury Risk  Goal: Absence of Fall and Fall-Related Injury  Outcome: Progressing  Intervention: Promote Injury-Free Environment  Recent Flowsheet Documentation  Taken 7/3/2025 1345 by Forest Rae RN  Safety Promotion/Fall Prevention:   assistive device/personal items within reach   fall prevention program maintained   nonskid shoes/slippers when out of bed   safety round/check completed  Taken 7/3/2025 1000 by Forest Rae RN  Safety Promotion/Fall Prevention:   assistive device/personal items within reach   fall prevention program maintained   nonskid shoes/slippers when out of bed   safety round/check completed  Taken 7/3/2025 0725 by Forest Rae RN  Safety Promotion/Fall Prevention:   assistive device/personal items within reach   fall prevention program maintained   nonskid shoes/slippers when out of bed   safety round/check completed     Problem: Skin Injury Risk Increased  Goal: Skin Health and Integrity  Outcome: Progressing  Intervention: Optimize Skin Protection  Recent Flowsheet Documentation  Taken 7/3/2025 1345 by Forest Rae RN  Pressure Reduction Techniques: frequent weight shift encouraged  Head of Bed (HOB) Positioning: HOB at 30 degrees  Pressure Reduction Devices: pressure-redistributing mattress utilized  Skin Protection: incontinence pads utilized  Taken 7/3/2025 1000 by Forest Rae RN  Head of Bed (HOB) Positioning: HOB at 45 degrees  Taken 7/3/2025 0725 by Forest Rae RN  Pressure Reduction Techniques:   frequent weight shift encouraged   weight shift assistance provided  Head of Bed (HOB) Positioning: HOB at 30  degrees  Pressure Reduction Devices: pressure-redistributing mattress utilized  Skin Protection: incontinence pads utilized     Problem: UTI (Urinary Tract Infection)  Goal: Improved Infection Symptoms  Outcome: Progressing   Goal Outcome Evaluation:  Plan of Care Reviewed With: patient        Progress: improving  Outcome Evaluation: Patient has been cooperative during shift. No complaints of pain, nausea or SOA. Nephrology consulted. Per Urology keep catheter now for worsening renal function and strict I/O. Will continue to monitor and assist patient as needed.

## 2025-07-03 NOTE — THERAPY EVALUATION
Patient Name: Agueda Krishna  : 10/20/1925    MRN: [id number]                              Today's Date: 7/3/2025       Admit Date: 2025    Visit Dx:     ICD-10-CM ICD-9-CM   1. Left ureteral calculus  N20.1 592.1   2. Hypovolemic shock  R57.1 785.59   3. Vomiting and diarrhea  R11.10 787.03    R19.7 787.91   4. Anemia, unspecified type  D64.9 285.9   5. Thrombocytopenia  D69.6 287.5   6. Leukocytosis, unspecified type  D72.829 288.60   7. Lactic acidosis  E87.20 276.2   8. Acute kidney injury  N17.9 584.9     Patient Active Problem List   Diagnosis    Hypovolemic shock    Acute kidney injury    Left ureteral calculus    Severe sepsis with thrombocytopenia    Folate deficiency    Splenomegaly, congestive, chronic    Cirrhosis of liver with ascites    Iron deficiency anemia     Past Medical History:   Diagnosis Date    History of stroke 2009    Hyperlipidemia     Hypertension     Hypothyroid      Past Surgical History:   Procedure Laterality Date    CATARACT EXTRACTION Bilateral     CYSTOSCOPY, RETROGRADE PYELOGRAM AND STENT INSERTION Left 2025    Procedure: CYSTOSCOPY, RETROGRADE PYELOGRAM, AND LEFT STENT INSERTION;  Surgeon: Joel Beebe MD;  Location: [facility];  Service: Urology;  Laterality: Left;    HYSTERECTOMY      REPLACEMENT TOTAL KNEE Left     TOTAL HIP ARTHROPLASTY Right       General Information       Row Name 25 1345          Physical Therapy Time and Intention    Document Type evaluation  -     Mode of Treatment individual therapy;physical therapy  -       Row Name 25 4625          General Information    Patient Profile Reviewed yes  -     Prior Level of Function independent:  -     Existing Precautions/Restrictions fall  -     Barriers to Rehab visual deficit;hearing deficit;medically complex  -       Row Name 25 1936          Living Environment    Current Living Arrangements home  -     People in Home alone  -       Row Name 25 1809           Cognition    Orientation Status (Cognition) oriented to;person;unable/difficult to assess  -       Row Name 07/03/25 1345          Safety Issues/Impairments Affecting Functional Mobility    Impairments Affecting Function (Mobility) balance;strength;cognition;endurance/activity tolerance  -               User Key  (r) = Recorded By, (t) = Taken By, (c) = Cosigned By      Initials Name Provider Type    Lara Caceres, PT Physical Therapist                   Mobility       Row Name 07/03/25 1351          Bed Mobility    Supine-Sit [place] (Bed Mobility) minimum assist (75% patient effort)  -     Assistive Device (Bed Mobility) bed rails  -       Row Name 07/03/25 1351          Sit-Stand Transfer    Sit-Stand [place] (Transfers) minimum assist (75% patient effort)  -       Row Name 07/03/25 1351          Gait/Stairs (Locomotion)    [place] Level (Gait) minimum assist (75% patient effort);moderate assist (50% patient effort)  -     Distance in Feet (Gait) 20  20 ft x2  -     Deviations/Abnormal Patterns (Gait) gait speed decreased;stride length decreased  -     Bilateral Gait Deviations forward flexed posture  -     Comment, (Gait/Stairs) Min to mod, HHA initially.  Improved with use of rolling walker to CGA-min a  -               User Key  (r) = Recorded By, (t) = Taken By, (c) = Cosigned By      Initials Name Provider Type    Lara Caceres, PT Physical Therapist                   Obj/Interventions       Row Name 07/03/25 1352          Range of Motion Comprehensive    Comment, General Range of Motion BLE WFL  -       Row Name 07/03/25 1352          Strength Comprehensive (MMT)    Comment, General Manual Muscle Testing (MMT) Assessment Generalized weakness, functional against gravity  -       Row Name 07/03/25 1352          Balance    Static Sitting Balance standby assist  -     Dynamic Sitting Balance contact guard  -     Position, Sitting Balance  sitting edge of bed  -KH     Static Standing Balance minimal assist;contact guard  -KH     Dynamic Standing Balance minimal assist  -KH     Position/Device Used, Standing Balance walker, rolling  -KH               User Key  (r) = Recorded By, (t) = Taken By, (c) = Cosigned By      Initials Name Provider Type    Lara Caceres, PT Physical Therapist                   Goals/Plan       Row Name 07/03/25 1349          Bed Mobility Goal 1 (PT)    Activity/Assistive Device (Bed Mobility Goal 1, PT) bed mobility activities, all  -KH     [place] Level/Cues Needed (Bed Mobility Goal 1, PT) contact guard required  -KH     Time Frame (Bed Mobility Goal 1, PT) 10 days  -       Row Name 07/03/25 1349          Transfer Goal 1 (PT)    Activity/Assistive Device (Transfer Goal 1, PT) sit-to-stand/stand-to-sit;bed-to-chair/chair-to-bed  -KH     [place] Level/Cues Needed (Transfer Goal 1, PT) contact guard required  -KH     Time Frame (Transfer Goal 1, PT) 10 days  -       Row Name 07/03/25 1349          Gait Training Goal 1 (PT)    Activity/Assistive Device (Gait Training Goal 1, PT) gait (walking locomotion)  -KH     [place] Level (Gait Training Goal 1, PT) contact guard required  -KH     Distance (Gait Training Goal 1, PT) 100  -KH     Time Frame (Gait Training Goal 1, PT) 10 days  -       Row Name 07/03/25 1349          Patient Education Goal (PT)    Activity (Patient Education Goal, PT) HEP  -KH     [place]/Cues/Accuracy (Memory Goal 2, PT) demonstrates adequately  -KH     Time Frame (Patient Education Goal, PT) 10 days  -       Row Name 07/03/25 1349          Therapy Assessment/Plan (PT)    Planned Therapy Interventions (PT) balance training;bed mobility training;home exercise program;gait training;patient/family education;transfer training;strengthening  -KH               User Key  (r) = Recorded By, (t) = Taken By, (c) = Cosigned By      Initials Name Provider Type    FRANK Busby  Lara Tavera, PT Physical Therapist                   Clinical Impression       Row Name 07/03/25 134          Pain    Pretreatment Pain Rating 0/10 - no pain  -     Posttreatment Pain Rating 0/10 - no pain  -     Pre/Posttreatment Pain Comment No signs or symptoms of pain  -       Row Name 07/03/25 1345          Plan of Care Review    Plan of Care Reviewed With patient;child  -     Outcome Evaluation Patient admitted from home with vomiting, diarrhea and generalized weakness.  Patient was in bed with family present.  Family reports patient is independently mobile at baseline and lives alone.  Patient is legally blind, [place] and aphasic.  Patient required min assist for bed mobility.  She was able to come to stand with min assist.  Initially, she ambulated with hand-held assist and required min to mod assist secondary to unsteady gait.  After brief seated rest break patient was able to stand and ambulate again with rolling walker.  Gait quality and stability were improved with use of rolling walker and patient ambulated with contact-guard assist to min assist.  Patient presents with generalized weakness, impaired balance, unsteady gait and functional mobility below baseline.  Patient would benefit from PT to address these impairments while admitted.  Recommend SNF placement at [facility] to further address these impairments and improve mobility and independence prior to returning home.  -       Row Name 07/03/25 5270          Therapy Assessment/Plan (PT)    Patient/Family Therapy Goals Statement (PT) Patient unable to say.  Family present and would like patient to [facility] to rehab  -     Rehab Potential (PT) [facility]  -     Criteria for Skilled Interventions Met (PT) yes  -     Therapy Frequency (PT) 5 times/wk  -       Row Name 07/03/25 2453          Positioning and Restraints    Pre-Treatment Position in bed  -     Post Treatment Position chair  -     In Chair reclined;call light within reach;encouraged to call  for assist;exit alarm on;with family/caregiver;notified nsg  -               User Key  (r) = Recorded By, (t) = Taken By, (c) = Cosigned By      Initials Name Provider Type    Lara Caceres, PT Physical Therapist                   Outcome Measures       Row Name 07/03/25 1350 07/03/25 0725       How much help from another person do you currently need...    Turning from your back to your side while in flat bed without using bedrails? 4  -KH 4  -MS    Moving from lying on back to sitting on the side of a flat bed without bedrails? 3  -KH 3  -MS    Moving to and from a bed to a chair (including a wheelchair)? 3  -KH 3  -MS    Standing up from a chair using your arms (e.g., wheelchair, bedside chair)? 3  -KH 3  -MS    Climbing 3-5 steps with a railing? 2  -KH 2  -MS    To walk in hospital room? 2  -KH 3  -MS    AM-PAC 6 Clicks Score (PT) 17  -KH 18  -MS    Highest Level of Mobility Goal Stand (1 or More Minutes)-5  -KH Walk 10 Steps or More-6  -MS      Row Name 07/03/25 1350          Functional Assessment    Outcome Measure Options AM-PAC 6 Clicks Basic Mobility (PT)  -               User Key  (r) = Recorded By, (t) = Taken By, (c) = Cosigned By      Initials Name Provider Type    Lara Caceres, PT Physical Therapist    Forest Mireles, RN Registered Nurse                                 Physical Therapy Education       Title: PT OT SLP Therapies (In Progress)       Topic: Physical Therapy (Not Started)       Point: Mobility training (Not Started)       Learner Progress:  Not documented in this visit.              Point: Home exercise program (Not Started)       Learner Progress:  Not documented in this visit.              Point: Body mechanics (Not Started)       Learner Progress:  Not documented in this visit.              Point: Precautions (Not Started)       Learner Progress:  Not documented in this visit.                                  PT Recommendation and Plan  Planned Therapy  Interventions (PT): balance training, bed mobility training, home exercise program, gait training, patient/family education, transfer training, strengthening  Outcome Evaluation: Patient admitted from home with vomiting, diarrhea and generalized weakness.  Patient was in bed with family present.  Family reports patient is independently mobile at baseline and lives alone.  Patient is legally blind, [place] and aphasic.  Patient required min assist for bed mobility.  She was able to come to stand with min assist.  Initially, she ambulated with hand-held assist and required min to mod assist secondary to unsteady gait.  After brief seated rest break patient was able to stand and ambulate again with rolling walker.  Gait quality and stability were improved with use of rolling walker and patient ambulated with contact-guard assist to min assist.  Patient presents with generalized weakness, impaired balance, unsteady gait and functional mobility below baseline.  Patient would benefit from PT to address these impairments while admitted.  Recommend SNF placement at [facility] to further address these impairments and improve mobility and independence prior to returning home.     Time Calculation:         PT Charges       Row Name 07/03/25 1352             Time Calculation    Start Time 1050  -      Stop Time 1115  -      Time Calculation (min) 25 min  -KH      PT Received On 07/03/25  -      PT - Next Appointment 07/04/25  -      PT Goal Re-Cert Due Date 07/13/25  -         Time Calculation- PT    Total Timed Code Minutes- PT 10 minute(s)  -         Timed Charges    11235 - PT Therapeutic Activity Minutes 10  -KH         Total Minutes    Timed Charges Total Minutes 10  -KH       Total Minutes 10  -KH                User Key  (r) = Recorded By, (t) = Taken By, (c) = Cosigned By      Initials Name Provider Type    Lara Caceres, PT Physical Therapist                  Therapy Charges for Today       Code  Description Service Date Service Provider Modifiers Qty    [id number]  PT THERAPEUTIC ACT EA 15 MIN 7/3/2025 Lara Busby, PT GP 1    [id number]  PT EVAL MOD COMPLEXITY 2 7/3/2025 Lara Busby, PT GP 1            PT G-Codes  Outcome Measure Options: AM-PAC 6 Clicks Basic Mobility (PT)  AM-PAC 6 Clicks Score (PT): 17  AM-PAC 6 Clicks Score (OT): 13  Modified Stafford Scale: 4 - Moderately severe disability.  Unable to walk without assistance, and unable to attend to own bodily needs without assistance.  PT Discharge Summary  Anticipated Discharge Disposition (PT): skilled nursing facility    Lara Busby, PT  7/3/2025

## 2025-07-03 NOTE — PROGRESS NOTES
Name: Agueda Krishna ADMIT: 2025   : 10/20/1925  PCP: Rogers BetyJOSESITO lares    MRN: [id number] LOS: 2 days   AGE/SEX: 89 y.o. female  ROOM: [facility]     Subjective   Subjective   No events overnight. Her son is at bedside. He says that she's doing much better over the last couple of days. She is largely aphasic          Objective   Objective   Vital Signs  Temp:  [97.5 °F (36.4 °C)-97.9 °F (36.6 °C)] 97.9 °F (36.6 °C)  Heart Rate:  [60-72] 72  Resp:  [16-18] 18  BP: (107-131)/(47-59) 107/59  SpO2:  [97 %-100 %] 99 %  on   ;   Device (Oxygen Therapy): room air  Body mass index is 25.76 kg/m².  Physical Exam  Constitutional:       General: She is not in acute distress.     Appearance: She is ill-appearing. She is not toxic-appearing.   Cardiovascular:      Rate and Rhythm: Normal rate and regular rhythm.      Heart sounds: Normal heart sounds.   Pulmonary:      Effort: Pulmonary effort is normal.      Breath sounds: Normal breath sounds.   Abdominal:      General: Bowel sounds are normal.      Palpations: Abdomen is soft.   Musculoskeletal:      Right lower leg: Edema present.      Left lower leg: Edema present.   Neurological:      Mental Status: She is alert.      Comments: Facial droop  Aphasia    Psychiatric:         Mood and Affect: Mood normal.         Behavior: Behavior normal.         Results Review     I reviewed the patient's new clinical results.  Results from last 7 days   Lab Units 25  0556 25  1143 25  1230   WBC 10*3/mm3 18.94* 23.10*  --  23.41*   HEMOGLOBIN g/dL 8.1* 9.0*  --  9.2*   PLATELETS 10*3/mm3 35* 30*  30* 22* 36*     Results from last 7 days   Lab Units 25  0556 25  1143 25  1230   SODIUM mmol/L 144 141 141   POTASSIUM mmol/L 4.3 5.1 4.9   CHLORIDE mmol/L 113* 109* 111*   CO2 mmol/L 19.7* 21.0* 17.0*   BUN mg/dL 56.0* 49.0* 42.0*   CREATININE mg/dL 3.13* 2.90* 2.88*   GLUCOSE mg/dL 129* 207* 60*   Estimated Creatinine Clearance: 10  "mL/min (A) (by C-G formula based on SCr of 3.13 mg/dL (H)).  Results from last 7 days   Lab Units 07/03/25  0556 07/02/25  1143 07/01/25  1230   ALBUMIN g/dL 2.4* 2.5* 2.7*   BILIRUBIN mg/dL <0.2 0.5 0.6   ALK PHOS U/L 86 104 113   AST (SGOT) U/L 31 60* 99*   ALT (SGPT) U/L 14 24 44*     Results from last 7 days   Lab Units 07/03/25  0556 07/02/25  1143 07/01/25  1230   CALCIUM mg/dL 7.5* 7.8* 8.2   ALBUMIN g/dL 2.4* 2.5* 2.7*   MAGNESIUM mg/dL  --  1.7  --    PHOSPHORUS mg/dL  --  5.1*  --      Results from last 7 days   Lab Units 07/01/25  2345 07/01/25  2049 07/01/25  1516 07/01/25  1230   PROCALCITONIN ng/mL  --   --   --  95.30*   LACTATE mmol/L 1.7 2.2* 5.5* 4.4*   No results found for: \"COVID19\"  No results found for: \"HGBA1C\", \"POCGLU\"    FL Retrograde Pyelogram In OR  Fluoroscopic retrograde pyelogram     Clinical: Ureteral calculus     FT 42 seconds   mGy 4.8   5 images     FINDINGS: Initial image demonstrates a guidewire within the left renal  collecting system. A catheter was introduced and the tip manipulated to  the pelvis. Partial retrograde filling with contrast material  demonstrates a filling defect at the UPJ. This corresponds to the stone  seen on prior CT. Double J ureteral stent was placed and the tip  manipulated into one of the upper pole calyces. Position is  satisfactory.     This report was finalized on 7/1/2025 6:36 PM by Dr. Kyle Malhotra M.D  on Workstation: [id number]     CT Abdomen Pelvis Without Contrast  Narrative: CT ABDOMEN PELVIS WO CONTRAST-     DATE OF EXAM: 7/1/2025 1:41 PM     INDICATION: vomiting, diarrhea, WBC 23, hypotensive.     COMPARISON: Prior renal ultrasound 9/18/2023.     TECHNIQUE: Multiple contiguous axial images were acquired through the  abdomen and pelvis without the intravenous administration of contrast.  Reformatted coronal and sagittal sequences were also reviewed. Radiation  dose reduction techniques were utilized, including automated exposure  control " and exposure modulation based on body size.     FINDINGS:  Motion artifact limits evaluation.     Partially imaged small left pleural effusion and trace right pleural  fluid with multifocal bibasilar subsegmental atelectasis and/or  scarring. Patchy dependent groundglass opacities in the base of each  lower lobe could reflect superimposed pneumonia. Bilateral lower lobe  bronchial plugging. Partially imaged severe calcified coronary artery  disease and aortic valve calcifications. Trace pericardial fluid.     Status post cholecystectomy. Mildly heterogeneous hepatic attenuation  with subtle nodular liver contours, which could reflect underlying  hepatocellular disease/cirrhosis. Recanalization of the periumbilical  vein. Mild splenomegaly with the spleen measuring 14.5 cm in diameter.  The pancreas and adrenal glands are unremarkable in limited noncontrast  CT appearance. Incompletely evaluated 1.5 cm slightly high attenuation  exophytic lesion at the upper pole of the right kidney, probable benign  hemorrhagic or proteinaceous cyst. Large 7 mm x 21 mm stone in the left  renal pelvis with mild left pelviectasis and mild left peripelvic and  perinephric fat stranding, likely intermittently obstructing. Additional  nonobstructing 3 to 4 mm stones in the left kidney. No ureteral stone is  identified. The urinary bladder is nondistended. Mild urinary bladder  wall thickening and patchy perivesical fat stranding, which could be  accentuated by under distention. Status post hysterectomy. The adnexa  are not definitively identified.     Mild diffuse gastric wall thickening could be accentuated by under  distention but could also reflect a nonspecific gastritis. Short segment  of distal ileum in the upper right hemipelvis with mild bowel wall  thickening suggesting a nonspecific segmental enteritis. Mild colorectal  stool. Extensive colonic diverticula, without specific CT evidence for  acute diverticulitis. No bowel  obstruction. The appendix is normal.     Trace perihepatic free fluid and trace free fluid in the pelvis. No free  intraperitoneal air. No pathologically enlarged lymph nodes in the  abdomen or pelvis. Moderate to severe calcified atherosclerotic disease  in the abdominal aorta and its branches without aneurysm.     Mild diffuse anasarca. Diffuse osteopenia. Mild to moderate multilevel  lumbar spondylosis with mild likely chronic/degenerative grade 1  anterolisthesis of L3 on L4 and L4 on L5. Partially imaged right MARQUEZ  with associated hardware artifact. No evidence of hardware  complications. Mild to moderate left hip joint DJD. No acute osseous  abnormality or concerning osseous lesion. The upper extremities are  partially included in the field-of-view but are not adequately  evaluated.     Impression:    1. Large 7 mm x 21 mm stone in the left renal pelvis with mild left  pelviectasis and mild left peripelvic and perinephric fat stranding,  suggesting possible intermediate obstruction and possible urinary tract  infection. Additional nonobstructing stones in the left kidney.  2. Urinary bladder wall thickening and mild perivesical fat stranding,  which could be accentuated by underdistention but could reflect a  nonspecific cystitis. Recommend correlation with urinalysis.  3. Mild diffuse gastric wall thickening could be accentuated by  underdistention but could reflect a nonspecific gastritis.  4. Short segment of distal ileal wall thickening, suggesting a  nonspecific segmental enteritis.  5. Mildly heterogeneous hepatic attenuation with subtle nodular liver  contours, which could reflect underlying hepatocellular  disease/cirrhosis, with evidence of portal venous hypertension including  splenomegaly, recanalization of the periumbilical vein, and trace  ascites.  6. Partially imaged small left pleural effusion and trace right pleural  fluid with multifocal bibasilar subsegmental atelectasis and/are  scarring  and patchy groundglass opacities in the dependent base of each  lower lobe but could reflect superimposed pneumonia.     This report was finalized on 7/1/2025 2:53 PM by Rodrigo Alejandre MD on  Workstation: [id number]       Scheduled Medications  carbidopa-levodopa, 1 tablet, Oral, BID  cefTRIAXone, 2,000 mg, Intravenous, Q24H  cholecalciferol, 5,000 Units, Oral, Daily  folic acid, 1 mg, Oral, Daily  levothyroxine, 75 mcg, Oral, Q AM  metoprolol tartrate, 12.5 mg, Oral, BID  sodium chloride, 10 mL, Intravenous, Q12H    Infusions  sodium chloride, 100 mL/hr, Last Rate: 100 mL/hr (07/03/25 0341)    Diet  Diet: Regular/House; Texture: Soft to Chew (NDD 3); Soft to Chew: Chopped Meat; Fluid Consistency: Nectar Thick       Assessment/Plan     Active Hospital Problems    Diagnosis  POA    **Hypovolemic shock [R57.1]  Yes    Severe sepsis with thrombocytopenia [A41.9, D69.59, R65.20]  Unknown    Folate deficiency [E53.8]  Unknown    Splenomegaly, congestive, chronic [D73.2]  Unknown    Cirrhosis of liver with ascites [K74.60, R18.8]  Unknown    Iron deficiency anemia [D50.9]  Unknown    Acute kidney injury [N17.9]  Unknown    Left ureteral calculus [N20.1]  Unknown      Resolved Hospital Problems   No resolved problems to display.       89 y.o. female admitted with Hypovolemic shock.    89 year old woman who presented with weakness, vomiting, and diarrhea and was found to have septic shock due to e coli bacteremia/pyelonephritis due to obstructive nephrolithiasis as well as an FAMILIA on CKD 3b    E coli bacteremia/pyelonephritis due to obstructive nephrolithiasis causing septic shock-s/p cystoscopy with retrograde pyelogram and left stent insertion on 7/1/25. On ceftriaxone per [facility]. Follow sensitivities  FAMILIA on CKD 3b-creatinine is stable to a little worse despite urology's relief of the obstruction. Continue IVF, but ask nephrology to follow. Strict I/Os.  Acute on chronic thrombocytopenia-chronic thrombocytopenia is likely due  to cirrhosis/splenomegaly. Acute thrombocytopenia is related to her infection. Hematology is following. She has been started on supplemental folic acid  Iron deficiency anemia-hgb is 8.1. no iv iron in the setting of bacteremia. Transfuse for hgb <7  Hyperlipidemia-on a statin as an outpatient. Can be restarted at discharge  Cirrhosis-based on imaging with evidence of portal hypertension  Hypothyroidism-synthroid  Parkinson's disease-sinemet  History of CVA with chronic aphasia   SCDs for DVT prophylaxis.  Limited code (no CPR, no intubation).  Discussed with patient and family.  Anticipate discharge to SNU facility timing yet to be determined.      Anup Mane MD  [facility]  07/03/25  13:45 EDT

## 2025-07-03 NOTE — PLAN OF CARE
Goal Outcome Evaluation:  Plan of Care Reviewed With: patient, child           Outcome Evaluation: Patient admitted from home with vomiting, diarrhea and generalized weakness.  Patient was in bed with family present.  Family reports patient is independently mobile at baseline and lives alone.  Patient is legally blind, [place] and aphasic.  Patient required min assist for bed mobility.  She was able to come to stand with min assist.  Initially, she ambulated with hand-held assist and required min to mod assist secondary to unsteady gait.  After brief seated rest break patient was able to stand and ambulate again with rolling walker.  Gait quality and stability were improved with use of rolling walker and patient ambulated with contact-guard assist to min assist.  Patient presents with generalized weakness, impaired balance, unsteady gait and functional mobility below baseline.  Patient would benefit from PT to address these impairments while admitted.  Recommend SNF placement at [facility] to further address these impairments and improve mobility and independence prior to returning home.    Anticipated Discharge Disposition (PT): skilled nursing facility

## 2025-07-03 NOTE — CONSULTS
[facility]   Consult Note    Patient Name: Agueda Krishna  : 10/20/1925  MRN: [id number]  Primary Care Physician:  Bety Mccloud APRN  Referring Physician: Kush Engel MD  Date of admission: 2025    Consults  Subjective   Subjective     Reason for Consult/ Chief Complaint: inocencio on ckd III baseline cr 1.2    History of Present Illness  Agueda Krishna is a 89 y.o. female with h/o ckd III followed by Dr. JAQUI Lang. Admitted with pyelonephritis, inocencio, and urinary obstruction.  S/p cystoscopy with stent placement.  Currently awake, no distress    Review of Systems     Personal History     Past Medical History:   Diagnosis Date    History of stroke 2009    Hyperlipidemia     Hypertension     Hypothyroid        Past Surgical History:   Procedure Laterality Date    CATARACT EXTRACTION Bilateral     CYSTOSCOPY, RETROGRADE PYELOGRAM AND STENT INSERTION Left 2025    Procedure: CYSTOSCOPY, RETROGRADE PYELOGRAM, AND LEFT STENT INSERTION;  Surgeon: Joel Beebe MD;  Location: [facility];  Service: Urology;  Laterality: Left;    HYSTERECTOMY      REPLACEMENT TOTAL KNEE Left     TOTAL HIP ARTHROPLASTY Right        Family History: family history is not on file. Otherwise pertinent FHx was reviewed and not pertinent to current issue.    Social History:  reports that she has never smoked. She has never used smokeless tobacco. She reports current alcohol use of about 1.0 standard drink of alcohol per week. Drug use questions deferred to the physician.    Home Medications:   carbidopa-levodopa, levothyroxine, metoprolol tartrate, rosuvastatin, solifenacin, and vitamin D3    Allergies:  Allergies   Allergen Reactions    Nitrofurantoin Hives    Sulfa Antibiotics Hives       Objective    Objective     Vitals:  Temp:  [97.5 °F (36.4 °C)-97.9 °F (36.6 °C)] 97.9 °F (36.6 °C)  Heart Rate:  [60-72] 72  Resp:  [16-18] 18  BP: (107-131)/(47-59) 107/59    Physical Exam  General Appearance:  In no acute distress.     HEENT: Normal HEENT exam.     Extremities: .  There is no deformity, effusion or dependent edema.    Neurological: Patient is alert     Result Review    Result Review:  I have personally reviewed the results from the time of this admission to 7/3/2025 14:48 EDT and agree with these findings:  []  Laboratory list / accordion  []  Microbiology  []  Radiology  []  EKG/Telemetry   []  Cardiology/Vascular   []  Pathology  []  Old records  []  Other:  Most notable findings include:       Assessment & Plan   Assessment / Plan     Brief Patient Summary:  Agueda Krishna is a 89 y.o. female who has inocencio on ckd III    Active Hospital Problems:  Active Hospital Problems    Diagnosis     **Hypovolemic shock     Severe sepsis with thrombocytopenia     Folate deficiency     Splenomegaly, congestive, chronic     Cirrhosis of liver with ascites     Iron deficiency anemia     Acute kidney injury     Left ureteral calculus    Urinary obstruction    Plan:   Patient seen and examined. Awake, alert. No distress. Labs and chart reviewed. With h/o ckd III baseline cr of 1.2, up to 1.8 at recent pcp visit.  admitted with urinary obstruction and sepsis. S/p cysto with stent placement. With h/o hypertension and liver cirrhosis.  Son at bedside.  No complaints currently. Cr elevated to 3 and has remained near that level since admission. Likely atn. With mild acidosis. Will continue ivf, will add bicarb. No need for hd.  Will follow    Loren Arce MD

## 2025-07-03 NOTE — PROGRESS NOTES
ID note for bacteremia  S: eating breakfast. No complaints. History per son as deaf    Physical Exam:   Vital Signs   Temp:  [97.5 °F (36.4 °C)-97.9 °F (36.6 °C)] 97.5 °F (36.4 °C)  Heart Rate:  [60-72] 60  Resp:  [16-18] 18  BP: (104-131)/(47-62) 131/56    GENERAL: Awake and alert, in no acute distress.   HEENT: Oropharynx is clear. Hearing is grossly normal.   EYES: . No conjunctival injection. No lid lag.   LUNGS:normal respiratory effort.   SKIN: no cutaneous eruptions in exposed areas       Results Review:  Cr 3.1  WBC 18.9  Blood culture 1/2 E. coli      A/p  E. coli bacteremia secondary to pyelonephritis and obstructing nephrolithiasis status post stent placement  Acute kidney injury on chronic kidney disease stage 3, antibiotics dosed appropriately  Thrombocytopenia secondary to sepsis    Patient seems to be improving after admission for severe pyelonephritis from obstructing nephrolithiasis with E. coli bacteremia.  Cont ceftriaxone 2 g IV every 24 hours.  Sensitivities of the E. coli are pending but anticipate changing over to renally dosed antibiotics as soon as tomorrow.    Check BMP and CBC in am for close abx monitoring to avoid toxicities in setting of inocencio                                                            The above decisions regarding antimicrobials incorporates elements of engaging in complex medical decision-making associated with antimicrobial prescribing including considerations like antimicrobial resistance patterns, interactions/complications from comorbidities including concurrent infections, public health considerations to minimize development of antimicrobial resistance, recent antibiotic exposure,

## 2025-07-04 ENCOUNTER — APPOINTMENT (OUTPATIENT)
Dept: CT IMAGING | Facility: HOSPITAL | Age: OVER 89
DRG: 853 | End: 2025-07-04
Payer: MEDICARE

## 2025-07-04 PROBLEM — N39.0 E. COLI UTI (URINARY TRACT INFECTION): Status: ACTIVE | Noted: 2025-07-04

## 2025-07-04 PROBLEM — B96.20 E. COLI UTI (URINARY TRACT INFECTION): Status: ACTIVE | Noted: 2025-07-04

## 2025-07-04 LAB
ANION GAP SERPL CALCULATED.3IONS-SCNC: 9 MMOL/L (ref 5–15)
ARTERIAL PATENCY WRIST A: POSITIVE
ATMOSPHERIC PRESS: 750.2 MMHG
BACTERIA SPEC AEROBE CULT: ABNORMAL
BASE EXCESS BLDA CALC-SCNC: -0.4 MMOL/L (ref 0–2)
BASOPHILS # BLD MANUAL: 0.12 10*3/MM3 (ref 0–0.2)
BASOPHILS NFR BLD MANUAL: 1 % (ref 0–1.5)
BDY SITE: ABNORMAL
BUN SERPL-MCNC: 49 MG/DL (ref 8–23)
BUN/CREAT SERPL: 18.8 (ref 7–25)
CALCIUM SPEC-SCNC: 7.2 MG/DL (ref 8.2–9.6)
CHLORIDE SERPL-SCNC: 114 MMOL/L (ref 98–107)
CO2 SERPL-SCNC: 21 MMOL/L (ref 22–29)
CREAT SERPL-MCNC: 2.61 MG/DL (ref 0.57–1)
DEPRECATED RDW RBC AUTO: 45.2 FL (ref 37–54)
DEVICE COMMENT: ABNORMAL
EGFRCR SERPLBLD CKD-EPI 2021: 16 ML/MIN/1.73
EOSINOPHIL # BLD MANUAL: 0.24 10*3/MM3 (ref 0–0.4)
EOSINOPHIL NFR BLD MANUAL: 2 % (ref 0.3–6.2)
ERYTHROCYTE [DISTWIDTH] IN BLOOD BY AUTOMATED COUNT: 14.3 % (ref 12.3–15.4)
GLUCOSE BLDC GLUCOMTR-MCNC: 84 MG/DL (ref 70–130)
GLUCOSE SERPL-MCNC: 72 MG/DL (ref 65–99)
GRAM STN SPEC: ABNORMAL
GRAM STN SPEC: ABNORMAL
HCO3 BLDA-SCNC: 24.1 MMOL/L (ref 22–28)
HCT VFR BLD AUTO: 24.1 % (ref 34–46.6)
HEMODILUTION: NO
HGB BLD-MCNC: 7.5 G/DL (ref 12–15.9)
ISOLATED FROM: ABNORMAL
LYMPHOCYTES # BLD MANUAL: 0.36 10*3/MM3 (ref 0.7–3.1)
LYMPHOCYTES NFR BLD MANUAL: 1 % (ref 5–12)
MCH RBC QN AUTO: 27.1 PG (ref 26.6–33)
MCHC RBC AUTO-ENTMCNC: 31.1 G/DL (ref 31.5–35.7)
MCV RBC AUTO: 87 FL (ref 79–97)
MODALITY: ABNORMAL
MONOCYTES # BLD: 0.12 10*3/MM3 (ref 0.1–0.9)
NEUTROPHILS # BLD AUTO: 11.18 10*3/MM3 (ref 1.7–7)
NEUTROPHILS NFR BLD MANUAL: 93 % (ref 42.7–76)
PCO2 BLDA: 38.1 MM HG (ref 35–45)
PH BLDA: 7.41 PH UNITS (ref 7.35–7.45)
PLAT MORPH BLD: NORMAL
PLATELET # BLD AUTO: 37 10*3/MM3 (ref 140–450)
PO2 BLDA: 78.6 MM HG (ref 80–100)
POTASSIUM SERPL-SCNC: 3.8 MMOL/L (ref 3.5–5.2)
RBC # BLD AUTO: 2.77 10*6/MM3 (ref 3.77–5.28)
RBC MORPH BLD: NORMAL
SAO2 % BLDCOA: 95.7 % (ref 92–98.5)
SODIUM SERPL-SCNC: 144 MMOL/L (ref 136–145)
TOTAL RATE: 20 BREATHS/MINUTE
VARIANT LYMPHS NFR BLD MANUAL: 1 % (ref 0–5)
VARIANT LYMPHS NFR BLD MANUAL: 2 % (ref 19.6–45.3)
WBC MORPH BLD: NORMAL
WBC NRBC COR # BLD AUTO: 12.02 10*3/MM3 (ref 3.4–10.8)

## 2025-07-04 PROCEDURE — 82803 BLOOD GASES ANY COMBINATION: CPT

## 2025-07-04 PROCEDURE — 99233 SBSQ HOSP IP/OBS HIGH 50: CPT | Performed by: INTERNAL MEDICINE

## 2025-07-04 PROCEDURE — G0545 PR INHERENT VISIT TO INPT: HCPCS | Performed by: INTERNAL MEDICINE

## 2025-07-04 PROCEDURE — 36600 WITHDRAWAL OF ARTERIAL BLOOD: CPT

## 2025-07-04 PROCEDURE — 85007 BL SMEAR W/DIFF WBC COUNT: CPT | Performed by: INTERNAL MEDICINE

## 2025-07-04 PROCEDURE — 99232 SBSQ HOSP IP/OBS MODERATE 35: CPT | Performed by: INTERNAL MEDICINE

## 2025-07-04 PROCEDURE — 82948 REAGENT STRIP/BLOOD GLUCOSE: CPT

## 2025-07-04 PROCEDURE — 85025 COMPLETE CBC W/AUTO DIFF WBC: CPT | Performed by: INTERNAL MEDICINE

## 2025-07-04 PROCEDURE — 70450 CT HEAD/BRAIN W/O DYE: CPT

## 2025-07-04 PROCEDURE — 80048 BASIC METABOLIC PNL TOTAL CA: CPT | Performed by: INTERNAL MEDICINE

## 2025-07-04 PROCEDURE — 25010000002 CEFTRIAXONE PER 250 MG: Performed by: INTERNAL MEDICINE

## 2025-07-04 RX ADMIN — CEFTRIAXONE 2000 MG: 2 INJECTION, POWDER, FOR SOLUTION INTRAMUSCULAR; INTRAVENOUS at 15:16

## 2025-07-04 RX ADMIN — SODIUM BICARBONATE: 84 INJECTION, SOLUTION INTRAVENOUS at 06:24

## 2025-07-04 RX ADMIN — CARBIDOPA AND LEVODOPA 1 TABLET: 25; 100 TABLET ORAL at 21:04

## 2025-07-04 RX ADMIN — METOPROLOL TARTRATE 12.5 MG: 25 TABLET, FILM COATED ORAL at 21:04

## 2025-07-04 RX ADMIN — SODIUM BICARBONATE: 84 INJECTION, SOLUTION INTRAVENOUS at 21:04

## 2025-07-04 RX ADMIN — Medication 10 ML: at 21:04

## 2025-07-04 RX ADMIN — LEVOTHYROXINE SODIUM 75 MCG: 0.07 TABLET ORAL at 06:49

## 2025-07-04 NOTE — PROGRESS NOTES
ID note    CC; f/u E coli septicemia due to  source    Subj: History from son. No acute events. No fever. She is tolerating ceftriaxone w/o rash or diarrhea.     Meds:    Current Facility-Administered Medications:     acetaminophen (TYLENOL) tablet 650 mg, 650 mg, Oral, Q4H PRN **OR** acetaminophen (TYLENOL) 160 MG/5ML oral solution 650 mg, 650 mg, Oral, Q4H PRN **OR** acetaminophen (TYLENOL) suppository 650 mg, 650 mg, Rectal, Q4H PRN, Joel Beebe MD    carbidopa-levodopa (SINEMET)  MG per tablet 1 tablet, 1 tablet, Oral, BID, Joel Beebe MD, 1 tablet at 07/03/25 2101    cefTRIAXone (ROCEPHIN) 2,000 mg in sodium chloride 0.9 % 100 mL MBP, 2,000 mg, Intravenous, Q24H, Emeka Broussard MD, Last Rate: 200 mL/hr at 07/03/25 1309, 2,000 mg at 07/03/25 1309    cholecalciferol (VITAMIN D3) tablet 5,000 Units, 5,000 Units, Oral, Daily, Joel Beebe MD, 5,000 Units at 07/03/25 0723    folic acid (FOLVITE) tablet 1 mg, 1 mg, Oral, Daily, Alexys Castano MD PhD, 1 mg at 07/03/25 0723    levothyroxine (SYNTHROID, LEVOTHROID) tablet 75 mcg, 75 mcg, Oral, Q AM, Joel Beebe MD, 75 mcg at 07/04/25 0649    metoprolol tartrate (LOPRESSOR) tablet 12.5 mg, 12.5 mg, Oral, BID, David Sandoavl MD, 12.5 mg at 07/03/25 2102    nitroglycerin (NITROSTAT) SL tablet 0.4 mg, 0.4 mg, Sublingual, Q5 Min PRN, Joel Beeeb MD    ondansetron ODT (ZOFRAN-ODT) disintegrating tablet 4 mg, 4 mg, Oral, Q6H PRN **OR** ondansetron (ZOFRAN) injection 4 mg, 4 mg, Intravenous, Q6H PRN, Joel Beebe MD    sodium chloride 0.45 % 925 mL with sodium bicarbonate 8.4 % 75 mEq infusion, , Intravenous, Continuous, Loren Arce MD, Last Rate: 100 mL/hr at 07/04/25 0624, New Bag at 07/04/25 0624    [COMPLETED] Insert Peripheral IV, , , Once **AND** sodium chloride 0.9 % flush 10 mL, 10 mL, Intravenous, PRN, Joel Beebe MD    sodium chloride 0.9 % flush 10 mL, 10 mL, Intravenous, Q12H, Joel Beebe,  MD, 10 mL at 07/03/25 2102    sodium chloride 0.9 % flush 10 mL, 10 mL, Intravenous, PRN, Joel Beebe MD    sodium chloride 0.9 % infusion 40 mL, 40 mL, Intravenous, PRN, Joel Beebe MD      Physical Exam:   Vital Signs   Temp:  [97.3 °F (36.3 °C)-97.9 °F (36.6 °C)] 97.7 °F (36.5 °C)  Heart Rate:  [61-72] 61  Resp:  [18] 18  BP: (107-168)/() 137/56    GENERAL: Lying in bed  Eyes: No scleral icterus  CV: Normal rate  LUNGS:normal respiratory effort.  No wheezing  SKIN: no rashes on exposed areas       Results Review:  CBC, BMP, blood cultures reviewed today    Lab Results   Component Value Date    WBC 12.02 (H) 07/04/2025    HGB 7.5 (L) 07/04/2025    HCT 24.1 (L) 07/04/2025    MCV 87.0 07/04/2025    PLT 37 (C) 07/04/2025     Lab Results   Component Value Date    GLUCOSE 72 07/04/2025    CALCIUM 7.2 (L) 07/04/2025     07/04/2025    K 3.8 07/04/2025    CO2 21.0 (L) 07/04/2025     (H) 07/04/2025    BUN 49.0 (H) 07/04/2025    CREATININE 2.61 (H) 07/04/2025    EGFR 16.0 (L) 07/04/2025    BCR 18.8 07/04/2025    ANIONGAP 9.0 07/04/2025     Lab Results   Component Value Date    ALT 14 07/03/2025     Urine Eos negative    Micro:  7/1 Blood culture 1/2 E. Coli (susceptible to all antibiotics tested)    Radiology:  CT abdomen/pelvis shows a 2.1 x 0.7 cm stone in the left renal pelvis and associated perinephric stranding.    Assessment/Plan:  E. coli bacteremia secondary to pyelonephritis and obstructing nephrolithiasis  2.   S/P post stent placement  3. FAMILIA superimposed upon CKD 3  4. Thrombocytopenia due to sepsis  5. Anemia    She is afebrile and WBC has improved.  She is on targeted antibiotic therapy with ceftriaxone 2 g IV every 24 hours.  It appears there are several reasonable oral antibiotics we could switch to closer to the time of discharge.  However, I suspect with her lab abnormalities and advanced age she will be here a few more days.  Therefore I recommend keeping her on ceftriaxone  at this time.    Repeat CBC and BMP in a.m. for close monitoring.    ID will see her again on 7/7 so please call ID MD on call at [phone] if any ID questions or concerns in the interim.

## 2025-07-04 NOTE — PLAN OF CARE
Goal Outcome Evaluation:              Outcome Evaluation: Order received for VFSS to determine if diet can be advanced. VFSS to be scheduled 7/7. ST to follow.

## 2025-07-04 NOTE — PROGRESS NOTES
Name: Agueda Krishna ADMIT: 2025   : 10/20/1925  PCP: Rogers BetyJOSESITO lares    MRN: [id number] LOS: 3 days   AGE/SEX: 89 y.o. female  ROOM: [facility]     Subjective   Subjective   No events overnight. Her other son and his wife are at bedside. They state that she's more sedate today. She was sleeping when I came into the room, but was able to be awoken. She nodded when I asked if she was doing ok, but didn't interact much outside of that. Her creatinine is starting to improve today.     Objective   Objective   Vital Signs  Temp:  [97.3 °F (36.3 °C)-97.9 °F (36.6 °C)] 97.7 °F (36.5 °C)  Heart Rate:  [61-72] 61  Resp:  [18] 18  BP: (107-168)/() 137/56  SpO2:  [97 %-99 %] 97 %  on   ;   Device (Oxygen Therapy): room air  Body mass index is 25.76 kg/m².  Physical Exam  Constitutional:       General: She is not in acute distress.     Appearance: She is ill-appearing. She is not toxic-appearing.   Cardiovascular:      Rate and Rhythm: Normal rate and regular rhythm.      Heart sounds: Normal heart sounds.   Pulmonary:      Effort: Pulmonary effort is normal.      Breath sounds: Normal breath sounds.   Abdominal:      General: Bowel sounds are normal.      Palpations: Abdomen is soft.   Musculoskeletal:      Right lower leg: Edema present.      Left lower leg: Edema present.   Neurological:      Mental Status: She is alert. Mental status is at baseline. She is disoriented.      Comments: Facial droop  Aphasia    Psychiatric:         Mood and Affect: Mood normal.         Behavior: Behavior normal.         Results Review     I reviewed the patient's new clinical results.  Results from last 7 days   Lab Units 25  0717 25  0556 25  1143 25  2050 25  1230   WBC 10*3/mm3 12.02* 18.94* 23.10*  --  23.41*   HEMOGLOBIN g/dL 7.5* 8.1* 9.0*  --  9.2*   PLATELETS 10*3/mm3 37* 35* 30*  30* 22* 36*     Results from last 7 days   Lab Units 25  0717 25  0556 25  1143  "07/01/25  1230   SODIUM mmol/L 144 144 141 141   POTASSIUM mmol/L 3.8 4.3 5.1 4.9   CHLORIDE mmol/L 114* 113* 109* 111*   CO2 mmol/L 21.0* 19.7* 21.0* 17.0*   BUN mg/dL 49.0* 56.0* 49.0* 42.0*   CREATININE mg/dL 2.61* 3.13* 2.90* 2.88*   GLUCOSE mg/dL 72 129* 207* 60*   Estimated Creatinine Clearance: 12 mL/min (A) (by C-G formula based on SCr of 2.61 mg/dL (H)).  Results from last 7 days   Lab Units 07/03/25  0556 07/02/25  1143 07/01/25  1230   ALBUMIN g/dL 2.4* 2.5* 2.7*   BILIRUBIN mg/dL <0.2 0.5 0.6   ALK PHOS U/L 86 104 113   AST (SGOT) U/L 31 60* 99*   ALT (SGPT) U/L 14 24 44*     Results from last 7 days   Lab Units 07/04/25  0717 07/03/25  0556 07/02/25  1143 07/01/25  1230   CALCIUM mg/dL 7.2* 7.5* 7.8* 8.2   ALBUMIN g/dL  --  2.4* 2.5* 2.7*   MAGNESIUM mg/dL  --   --  1.7  --    PHOSPHORUS mg/dL  --   --  5.1*  --      Results from last 7 days   Lab Units 07/01/25  2345 07/01/25 2049 07/01/25  1516 07/01/25  1230   PROCALCITONIN ng/mL  --   --   --  95.30*   LACTATE mmol/L 1.7 2.2* 5.5* 4.4*   No results found for: \"COVID19\"  Glucose   Date/Time Value Ref Range Status   07/04/2025 1110 84 70 - 130 mg/dL Final       FL Retrograde Pyelogram In OR  Fluoroscopic retrograde pyelogram     Clinical: Ureteral calculus     FT 42 seconds   mGy 4.8   5 images     FINDINGS: Initial image demonstrates a guidewire within the left renal  collecting system. A catheter was introduced and the tip manipulated to  the pelvis. Partial retrograde filling with contrast material  demonstrates a filling defect at the UPJ. This corresponds to the stone  seen on prior CT. Double J ureteral stent was placed and the tip  manipulated into one of the upper pole calyces. Position is  satisfactory.     This report was finalized on 7/1/2025 6:36 PM by Dr. Kyle Malhotra M.D  on Workstation: [id number]     CT Abdomen Pelvis Without Contrast  Narrative: CT ABDOMEN PELVIS WO CONTRAST-     DATE OF EXAM: 7/1/2025 1:41 PM     INDICATION: " vomiting, diarrhea, WBC 23, hypotensive.     COMPARISON: Prior renal ultrasound 9/18/2023.     TECHNIQUE: Multiple contiguous axial images were acquired through the  abdomen and pelvis without the intravenous administration of contrast.  Reformatted coronal and sagittal sequences were also reviewed. Radiation  dose reduction techniques were utilized, including automated exposure  control and exposure modulation based on body size.     FINDINGS:  Motion artifact limits evaluation.     Partially imaged small left pleural effusion and trace right pleural  fluid with multifocal bibasilar subsegmental atelectasis and/or  scarring. Patchy dependent groundglass opacities in the base of each  lower lobe could reflect superimposed pneumonia. Bilateral lower lobe  bronchial plugging. Partially imaged severe calcified coronary artery  disease and aortic valve calcifications. Trace pericardial fluid.     Status post cholecystectomy. Mildly heterogeneous hepatic attenuation  with subtle nodular liver contours, which could reflect underlying  hepatocellular disease/cirrhosis. Recanalization of the periumbilical  vein. Mild splenomegaly with the spleen measuring 14.5 cm in diameter.  The pancreas and adrenal glands are unremarkable in limited noncontrast  CT appearance. Incompletely evaluated 1.5 cm slightly high attenuation  exophytic lesion at the upper pole of the right kidney, probable benign  hemorrhagic or proteinaceous cyst. Large 7 mm x 21 mm stone in the left  renal pelvis with mild left pelviectasis and mild left peripelvic and  perinephric fat stranding, likely intermittently obstructing. Additional  nonobstructing 3 to 4 mm stones in the left kidney. No ureteral stone is  identified. The urinary bladder is nondistended. Mild urinary bladder  wall thickening and patchy perivesical fat stranding, which could be  accentuated by under distention. Status post hysterectomy. The adnexa  are not definitively identified.      Mild diffuse gastric wall thickening could be accentuated by under  distention but could also reflect a nonspecific gastritis. Short segment  of distal ileum in the upper right hemipelvis with mild bowel wall  thickening suggesting a nonspecific segmental enteritis. Mild colorectal  stool. Extensive colonic diverticula, without specific CT evidence for  acute diverticulitis. No bowel obstruction. The appendix is normal.     Trace perihepatic free fluid and trace free fluid in the pelvis. No free  intraperitoneal air. No pathologically enlarged lymph nodes in the  abdomen or pelvis. Moderate to severe calcified atherosclerotic disease  in the abdominal aorta and its branches without aneurysm.     Mild diffuse anasarca. Diffuse osteopenia. Mild to moderate multilevel  lumbar spondylosis with mild likely chronic/degenerative grade 1  anterolisthesis of L3 on L4 and L4 on L5. Partially imaged right MARQUEZ  with associated hardware artifact. No evidence of hardware  complications. Mild to moderate left hip joint DJD. No acute osseous  abnormality or concerning osseous lesion. The upper extremities are  partially included in the field-of-view but are not adequately  evaluated.     Impression:    1. Large 7 mm x 21 mm stone in the left renal pelvis with mild left  pelviectasis and mild left peripelvic and perinephric fat stranding,  suggesting possible intermediate obstruction and possible urinary tract  infection. Additional nonobstructing stones in the left kidney.  2. Urinary bladder wall thickening and mild perivesical fat stranding,  which could be accentuated by underdistention but could reflect a  nonspecific cystitis. Recommend correlation with urinalysis.  3. Mild diffuse gastric wall thickening could be accentuated by  underdistention but could reflect a nonspecific gastritis.  4. Short segment of distal ileal wall thickening, suggesting a  nonspecific segmental enteritis.  5. Mildly heterogeneous hepatic  attenuation with subtle nodular liver  contours, which could reflect underlying hepatocellular  disease/cirrhosis, with evidence of portal venous hypertension including  splenomegaly, recanalization of the periumbilical vein, and trace  ascites.  6. Partially imaged small left pleural effusion and trace right pleural  fluid with multifocal bibasilar subsegmental atelectasis and/are  scarring and patchy groundglass opacities in the dependent base of each  lower lobe but could reflect superimposed pneumonia.     This report was finalized on 7/1/2025 2:53 PM by Rodrigo Alejandre MD on  Workstation: [id number]       Scheduled Medications  carbidopa-levodopa, 1 tablet, Oral, BID  cefTRIAXone, 2,000 mg, Intravenous, Q24H  cholecalciferol, 5,000 Units, Oral, Daily  folic acid, 1 mg, Oral, Daily  levothyroxine, 75 mcg, Oral, Q AM  metoprolol tartrate, 12.5 mg, Oral, BID  sodium chloride, 10 mL, Intravenous, Q12H    Infusions  sodium chloride 0.45 % 925 mL with sodium bicarbonate 8.4 % 75 mEq infusion, , Last Rate: 100 mL/hr at 07/04/25 0624    Diet  Diet: Regular/House; Texture: Soft to Chew (NDD 3); Soft to Chew: Chopped Meat; Fluid Consistency: Nectar Thick       Assessment/Plan     Active Hospital Problems    Diagnosis  POA    **E. coli UTI (urinary tract infection) [N39.0, B96.20]  Yes    Severe sepsis with thrombocytopenia [A41.9, D69.59, R65.20]  Yes    Folate deficiency [E53.8]  Yes    Splenomegaly, congestive, chronic [D73.2]  Yes    Cirrhosis of liver with ascites [K74.60, R18.8]  Yes    Iron deficiency anemia [D50.9]  Yes    Hypovolemic shock [R57.1]  Yes    Acute kidney injury [N17.9]  Yes    Left ureteral calculus [N20.1]  Yes      Resolved Hospital Problems   No resolved problems to display.       89 y.o. female admitted with E. coli UTI (urinary tract infection).    89 year old woman who presented with weakness, vomiting, and diarrhea and was found to have septic shock due to e coli bacteremia/pyelonephritis due  to obstructive nephrolithiasis as well as an FAMILIA on CKD 3b    E coli bacteremia/pyelonephritis due to obstructive nephrolithiasis causing septic shock-s/p cystoscopy with retrograde pyelogram and left stent insertion on 7/1/25. On ceftriaxone per ID, but the organism is pan-sensitive   FAMILIA on CKD 3b-creatinine is starting to improve and is down to 2.61 today from 3.13 yesterday. Currently on a sodium bicarb drip per nephrology  Acute on chronic thrombocytopenia-chronic thrombocytopenia is likely due to cirrhosis/splenomegaly. Acute thrombocytopenia is related to her infection. Hematology is following. She has been started on supplemental folic acid  Iron deficiency anemia-hgb is 7.5. no iv iron in the setting of bacteremia. Transfuse for hgb <7  Hyperlipidemia-on a statin as an outpatient. Can be restarted at discharge  Cirrhosis-based on imaging with evidence of portal hypertension  Hypothyroidism-synthroid  Parkinson's disease-sinemet  History of CVA with chronic aphasia   Dysphagia-on a regular diet at home. Her diet was modified here earlier in the hospital stay when she was more acutely ill. Ask SLP to reassess to see if diet can be liberalized   SCDs for DVT prophylaxis.  Limited code (no CPR, no intubation).  Discussed with patient, family, and nursing staff.  Anticipate discharge to SNU facility timing yet to be determined.  Improving, but poor prognosis overall      Anup Mane MD  [facility]  07/04/25  12:40 EDT

## 2025-07-04 NOTE — PROGRESS NOTES
LOS: 3 days   Patient Care Team:  Bety Mccloud APRN as PCP - General (Nurse Practitioner)    Chief Complaint: Sepsis with urinary tract infection.  Severe thrombocytopenia.    Interval History:  7/3/2025  Patient is afebrile.  Overall feeling better.  Galarza catheter still in place with cloudy lucinda-colored urine.  Platelets marginally better 35,000.    7/4/2025  Patient is afebrile.  Vital stable.  However patient has altered mental status, according to her son who is at the bedside, patient has not said anything since her son came in around 7:30 this morning.  Patient is also not able to recognize me as she was able to for the past 2 days.       A comprehensive 14 point review of systems was performed and was negative except as mentioned.    Vital Signs:  Temp:  [97.3 °F (36.3 °C)-97.9 °F (36.6 °C)] 97.7 °F (36.5 °C)  Heart Rate:  [61-72] 61  Resp:  [18] 18  BP: (107-168)/() 137/56    Intake/Output Summary (Last 24 hours) at 7/4/2025 1235  Last data filed at 7/4/2025 0653  Gross per 24 hour   Intake 1161.67 ml   Output 1150 ml   Net 11.67 ml       Physical Exam:  GENERAL:  Well-developed, well-nourished in no acute distress.  Definitely not as reactive to conversation as she did the past 2 days.  SKIN:  Warm, dry without rashes.  Large bruises left upper arm likely from IV needle access.  HEENT:  Normocephalic.  Patient tends to stare at the right front area.  CHEST: Normal respiratory effort.  Lungs clear to auscultation. Good airflow.  CARDIAC:  Regular rate and rhythm. Normal S1,S2.  ABDOMEN:  Soft, no tender.    EXTREMITIES:  No lower extremity edema.      Results Review:   CBC:      Lab 07/04/25  0717 07/03/25  0556 07/02/25  1143 07/01/25 2050 07/01/25  1230   WBC 12.02* 18.94* 23.10*  --  23.41*   HEMOGLOBIN 7.5* 8.1* 9.0*  --  9.2*   HEMATOCRIT 24.1* 26.7* 29.3*  --  30.4*   PLATELETS 37* 35* 30*  30* 22* 36*   NEUTROS ABS 11.18*  --  22.16*  --  22.24*   EOS ABS 0.24  --  0.00  --  0.00   MCV  87.0 89.3 89.1  --  91.8     CMP:        Lab 07/04/25  0717 07/03/25  0556 07/02/25  1143 07/01/25  1230   SODIUM 144 144 141 141   POTASSIUM 3.8 4.3 5.1 4.9   CHLORIDE 114* 113* 109* 111*   CO2 21.0* 19.7* 21.0* 17.0*   ANION GAP 9.0 11.3 11.0 13.0   BUN 49.0* 56.0* 49.0* 42.0*   CREATININE 2.61* 3.13* 2.90* 2.88*   EGFR 16.0* 12.9* 14.1* 14.2*   GLUCOSE 72 129* 207* 60*   CALCIUM 7.2* 7.5* 7.8* 8.2   IONIZED CALCIUM  --  1.10* 1.07*  --    MAGNESIUM  --   --  1.7  --    PHOSPHORUS  --   --  5.1*  --    TOTAL PROTEIN  --  4.7* 5.1* 5.2*   ALBUMIN  --  2.4* 2.5* 2.7*   GLOBULIN  --  2.3 2.6 2.5   ALT (SGPT)  --  14 24 44*   AST (SGOT)  --  31 60* 99*   BILIRUBIN  --  <0.2 0.5 0.6   ALK PHOS  --  86 104 113   LIPASE  --   --   --  16     Lab Results   Component Value Date    AUMURIIH26 842 07/02/2025     Lab Results   Component Value Date    FOLATE 4.24 (L) 07/02/2025     Lab Results   Component Value Date    IRON 14 (L) 07/01/2025    LABIRON 4 (L) 07/01/2025    TRANSFERRIN 250 07/01/2025    TIBC 373 07/01/2025    FERRITIN 154.00 (H) 07/01/2025           Results from last 7 days   Lab Units 07/01/25  1324   INR  1.72*   APTT seconds 35.5*         Results from last 7 days   Lab Units 07/02/25  1143   MAGNESIUM mg/dL 1.7           I reviewed the patient's new clinical results.        Assessment:  *.  Sepsis with E. coli bacteremia with leukocytosis.  Patient was hypotensive, and acute renal injury, and altered mental status with lethargic.  Patient has been given IV hydration and IV antibiotics.  Her condition has significant proved according to her son at the bedside.  Patient herself also reports feeling better.  7/3/2025 patient is afebrile.  Continue IV antibiotics.  7/4/2025 patient is afebrile.  WBCs further improving.       *.  Left nephrolithiasis, status post cystoscope with stent placement on 7/1/2025.  There was a purulent material from the bladder according to procedure note.      *.  Severe  thrombocytopenia on top of chronic thrombocytopenia.  Looking back this patient has low platelets starting at least 2015.   Her son reports a few weeks ago laboratory studies showed thrombocytopenia.  I think this is from [facility] with lab study on 6/11/2025 reporting platelets 90,000.  Per records her platelet count was only 73,000 on 6/9/2025.  I think her chronic thrombocytopenia is related to liver cirrhosis and splenomegaly as evidenced on the CT scan of the abdomen from 7/1/2025.  Her son reports they do not know this information before this admission.   Currently this patient has severe thrombocytopenia on top of chronic thrombocytopenia , which is likely due to thesepsis and acute infection.  Last night platelets was 22,000 however this new laboratory studies showed slightly improved and platelets 30,000 but still with elevated IPF 15.1%.  I do not think this patient has ITP.  Elevated IPF simply indicates this patient is still having some bone marrow response to produce platelets.  There is no need for steroids or transfusion.  7/3/2025 platelets 35,000.  Continue to monitor.  7/4/2025 platelets 37,000.        *.  Chronic anemia with evidence of iron deficiency.    This is evidenced by her laboratory studies from [facility] on 6/9/2025 reporting ferritin 40, and the iron saturation was only 5%.  Her baseline hemoglobin was about 10.7.  Currently worsening anemia is due to her infection.  7/1/2025 Hb 9.2.  Her iron studies 7/1/2025 reported higher level of ferritin however low iron saturation.  This fits with inflammatory conditions.    7/2/2025 hemoglobin 9.0.  I explained to patient's son at the bedside, there is no hurry to start the patient on oral iron treatment at this point since she is pretty sick from the sepsis.  When she improves, we could consider starting oral iron treatment because it could have caused nausea constipation.  Her son reports patient already has intermittent  constipation and diarrhea.  7/3/2025 hemoglobin 8.1.  7/4/2025 trending down hemoglobin 7.5.      *.  Folate deficiency.  Laboratory studies today 7/2/2025 reported folate 4.24 ng/mL and normal B12 842 pg/mL.    started folic acid 1 mg daily to help with correction and erythropoiesis.     *.  Altered mental status.  7/4/2025 her son reports patient has not received anything since her son came around 7:30 AM.  Patient also does not recognize me as she used to.  Could this patient having stroke?  Intracranial hemorrhage?  Will obtain stat head CT without contrast.    PLAN:  Obtain stat head CT without contrast for further evaluation of altered mental status.    Continue IV antibiotics.  Patient is followed by ID service.  Continue folic acid 1 mg daily.  No need for platelet transfusion unless she has evidence of intracranial hemorrhage.  Monitor CBC in the morning.   When patient improves further, could consider starting oral iron, or could start as outpatient.    Will follow-up.     I discussed with the patient and her son at bedside about laboratory results and further management plan.  Her son voiced understanding and agreeable.    I communicated with the A Dr. Mane.    I also spoke with nursing staff.      Alexys Castano MD PhD  07/04/25  12:35 EDT

## 2025-07-04 NOTE — PLAN OF CARE
Goal Outcome Evaluation:  Plan of Care Reviewed With: patient        Progress: improving  Outcome Evaluation: No complaints of pain, urine hazy yellow, F/C remains in place, NSR on the monitor, Eyes closed at a long interval, resting quietly in her room

## 2025-07-04 NOTE — PROGRESS NOTES
" LOS: 3 days   Patient Care Team:  Bety Mccloud APRN as PCP - General (Nurse Practitioner)    Chief Complaint: FAMILIA, obstructive uropathy         History of Present Illness  Agueda Krishna is a 89 y.o. female with h/o ckd III followed by Dr. JAQUI Lang. Admitted with pyelonephritis, familia, and urinary obstruction.  S/p cystoscopy with stent placement.  Currently awake, no distress     Subjective  No acute events.   Started on bicarb gtt yesterday.   UOP seems to be improving.   Patient sleeping.     History taken from: patient chart    Objective     Vital Sign Min/Max for last 24 hours  Temp  Min: 97.3 °F (36.3 °C)  Max: 97.9 °F (36.6 °C)   BP  Min: 107/59  Max: 168/60   Pulse  Min: 61  Max: 72   Resp  Min: 18  Max: 18   SpO2  Min: 97 %  Max: 99 %   No data recorded   No data recorded     Flowsheet Rows      Flowsheet Row First Filed Value   Admission Height 167.6 cm (66\") Documented at 07/01/2025 1234   Admission Weight 70.3 kg (155 lb) Documented at 07/01/2025 1234            No intake/output data recorded.  I/O last 3 completed shifts:  In: 2416.7 [I.V.:2416.7]  Out: 1500 [Urine:1500]    Objective:  Vital signs: (most recent): Blood pressure 137/56, pulse 61, temperature 97.7 °F (36.5 °C), temperature source Axillary, resp. rate 18, height 167.6 cm (66\"), weight 72.4 kg (159 lb 9.8 oz), SpO2 97%, not currently breastfeeding.            Physical Examination: General appearance - sleeping, NAD   Chest - clear to auscultation, no wheezes, rales or rhonchi, symmetric air entry  Heart - normal rate, regular rhythm, normal S1, S2, no murmurs, rubs, clicks or gallops  Abdomen - soft, nontender, nondistended, no masses or organomegaly  Extremities - peripheral pulses normal, no pedal edema, no clubbing or cyanosis     Results Review:     I reviewed the patient's new clinical results.    WBC WBC   Date Value Ref Range Status   07/03/2025 18.94 (H) 3.40 - 10.80 10*3/mm3 Final   07/02/2025 23.10 (H) 3.40 - 10.80 10*3/mm3 Final " "  07/01/2025 23.41 (H) 3.40 - 10.80 10*3/mm3 Final      HGB Hemoglobin   Date Value Ref Range Status   07/03/2025 8.1 (L) 12.0 - 15.9 g/dL Final   07/02/2025 9.0 (L) 12.0 - 15.9 g/dL Final   07/01/2025 9.2 (L) 12.0 - 15.9 g/dL Final      HCT Hematocrit   Date Value Ref Range Status   07/03/2025 26.7 (L) 34.0 - 46.6 % Final   07/02/2025 29.3 (L) 34.0 - 46.6 % Final   07/01/2025 30.4 (L) 34.0 - 46.6 % Final      Platlets No results found for: \"LABPLAT\"   MCV MCV   Date Value Ref Range Status   07/03/2025 89.3 79.0 - 97.0 fL Final   07/02/2025 89.1 79.0 - 97.0 fL Final   07/01/2025 91.8 79.0 - 97.0 fL Final          Sodium Sodium   Date Value Ref Range Status   07/03/2025 144 136 - 145 mmol/L Final   07/02/2025 141 136 - 145 mmol/L Final   07/01/2025 141 136 - 145 mmol/L Final      Potassium Potassium   Date Value Ref Range Status   07/03/2025 4.3 3.5 - 5.2 mmol/L Final   07/02/2025 5.1 3.5 - 5.2 mmol/L Final   07/01/2025 4.9 3.5 - 5.2 mmol/L Final     Comment:     Slight hemolysis detected by analyzer. Result may be falsely elevated.      Chloride Chloride   Date Value Ref Range Status   07/03/2025 113 (H) 98 - 107 mmol/L Final   07/02/2025 109 (H) 98 - 107 mmol/L Final   07/01/2025 111 (H) 98 - 107 mmol/L Final      CO2 CO2   Date Value Ref Range Status   07/03/2025 19.7 (L) 22.0 - 29.0 mmol/L Final   07/02/2025 21.0 (L) 22.0 - 29.0 mmol/L Final   07/01/2025 17.0 (L) 22.0 - 29.0 mmol/L Final      BUN BUN   Date Value Ref Range Status   07/03/2025 56.0 (H) 8.0 - 23.0 mg/dL Final   07/02/2025 49.0 (H) 8.0 - 23.0 mg/dL Final   07/01/2025 42.0 (H) 8.0 - 23.0 mg/dL Final      Creatinine Creatinine   Date Value Ref Range Status   07/03/2025 3.13 (H) 0.57 - 1.00 mg/dL Final   07/02/2025 2.90 (H) 0.57 - 1.00 mg/dL Final   07/01/2025 2.88 (H) 0.57 - 1.00 mg/dL Final      Calcium Calcium   Date Value Ref Range Status   07/03/2025 7.5 (L) 8.2 - 9.6 mg/dL Final   07/02/2025 7.8 (L) 8.2 - 9.6 mg/dL Final   07/01/2025 8.2 8.2 - " "9.6 mg/dL Final      PO4 No results found for: \"CAPO4\"   Albumin Albumin   Date Value Ref Range Status   07/03/2025 2.4 (L) 3.5 - 5.2 g/dL Final   07/02/2025 2.5 (L) 3.5 - 5.2 g/dL Final   07/01/2025 2.7 (L) 3.5 - 5.2 g/dL Final      Magnesium Magnesium   Date Value Ref Range Status   07/02/2025 1.7 1.7 - 2.3 mg/dL Final      Uric Acid No results found for: \"URICACID\"     Medication Review:     Current Facility-Administered Medications:     acetaminophen (TYLENOL) tablet 650 mg, 650 mg, Oral, Q4H PRN **OR** acetaminophen (TYLENOL) 160 MG/5ML oral solution 650 mg, 650 mg, Oral, Q4H PRN **OR** acetaminophen (TYLENOL) suppository 650 mg, 650 mg, Rectal, Q4H PRN, Joel Beebe MD    carbidopa-levodopa (SINEMET)  MG per tablet 1 tablet, 1 tablet, Oral, BID, Joel Beebe MD, 1 tablet at 07/03/25 2101    cefTRIAXone (ROCEPHIN) 2,000 mg in sodium chloride 0.9 % 100 mL MBP, 2,000 mg, Intravenous, Q24H, Emeka Broussard MD, Last Rate: 200 mL/hr at 07/03/25 1309, 2,000 mg at 07/03/25 1309    cholecalciferol (VITAMIN D3) tablet 5,000 Units, 5,000 Units, Oral, Daily, Joel Beebe MD, 5,000 Units at 07/03/25 0723    folic acid (FOLVITE) tablet 1 mg, 1 mg, Oral, Daily, Alexys Castano MD PhD, 1 mg at 07/03/25 0723    levothyroxine (SYNTHROID, LEVOTHROID) tablet 75 mcg, 75 mcg, Oral, Q AM, Joel Beebe MD, 75 mcg at 07/04/25 0649    metoprolol tartrate (LOPRESSOR) tablet 12.5 mg, 12.5 mg, Oral, BID, David Sandoval MD, 12.5 mg at 07/03/25 2102    nitroglycerin (NITROSTAT) SL tablet 0.4 mg, 0.4 mg, Sublingual, Q5 Min PRN, Joel Beebe MD    ondansetron ODT (ZOFRAN-ODT) disintegrating tablet 4 mg, 4 mg, Oral, Q6H PRN **OR** ondansetron (ZOFRAN) injection 4 mg, 4 mg, Intravenous, Q6H PRN, Joel Beebe MD    sodium chloride 0.45 % 925 mL with sodium bicarbonate 8.4 % 75 mEq infusion, , Intravenous, Continuous, Loren Arce MD, Last Rate: 100 mL/hr at 07/04/25 0624, New Bag at " 07/04/25 0624    [COMPLETED] Insert Peripheral IV, , , Once **AND** sodium chloride 0.9 % flush 10 mL, 10 mL, Intravenous, PRN, Joel Beebe MD    sodium chloride 0.9 % flush 10 mL, 10 mL, Intravenous, Q12H, Joel Beebe MD, 10 mL at 07/03/25 2102    sodium chloride 0.9 % flush 10 mL, 10 mL, Intravenous, PRN, Joel Beebe MD    sodium chloride 0.9 % infusion 40 mL, 40 mL, Intravenous, PRN, Joel Beebe MD      Assessment & Plan       Hypovolemic shock    Acute kidney injury    Left ureteral calculus    Severe sepsis with thrombocytopenia    Folate deficiency    Splenomegaly, congestive, chronic    Cirrhosis of liver with ascites    Iron deficiency anemia      Assessment & Plan  FAMILIA   CKD3 (baseline around 1.2)  Obstructive uropathy s/p stent placement.   Thrombocytopenia   Metabolic acidosis     Renal function improved from 3.1 to 2.6.   UOP improved as well as metabolic acidosis   Likely ATN starting to resolve.   Electrolytes at goal  Will continue bicarb gtt today, but can change to LR tomorrow if renal function is still above her baseline and CO2 is at goal.   Discussed with son.   Will follow         Yoly Carmen MD  07/04/25  08:25 EDT

## 2025-07-04 NOTE — PROGRESS NOTES
[facility] Progress Note     Appears well.  Mostly aphasic at baseline ,eating. montes de oca draining     A&P     L urolithiasis  Sepsis  Acute kidney injury     Nephrology following  - cont montes de oca to monitor UOP until Cr improves  - WBC improved to 12.2  - on rocephin; blood culture + for e coli  - Cr improving today 2.61; IVF; should hopefully recover soon; low UOP  - will follow

## 2025-07-05 ENCOUNTER — APPOINTMENT (OUTPATIENT)
Dept: NEUROLOGY | Facility: HOSPITAL | Age: OVER 89
DRG: 853 | End: 2025-07-05
Payer: MEDICARE

## 2025-07-05 LAB
ALBUMIN SERPL-MCNC: 2.1 G/DL (ref 3.5–5.2)
ANION GAP SERPL CALCULATED.3IONS-SCNC: 7.6 MMOL/L (ref 5–15)
BACTERIA SPEC AEROBE CULT: ABNORMAL
BH BB BLOOD EXPIRATION DATE: NORMAL
BH BB BLOOD EXPIRATION DATE: NORMAL
BH BB BLOOD TYPE BARCODE: 5100
BH BB BLOOD TYPE BARCODE: 5100
BH BB DISPENSE STATUS: NORMAL
BH BB DISPENSE STATUS: NORMAL
BH BB PRODUCT CODE: NORMAL
BH BB PRODUCT CODE: NORMAL
BH BB UNIT NUMBER: NORMAL
BH BB UNIT NUMBER: NORMAL
BUN SERPL-MCNC: 40 MG/DL (ref 8–23)
BUN/CREAT SERPL: 19.4 (ref 7–25)
CALCIUM SPEC-SCNC: 7.3 MG/DL (ref 8.2–9.6)
CHLORIDE SERPL-SCNC: 111 MMOL/L (ref 98–107)
CO2 SERPL-SCNC: 23.4 MMOL/L (ref 22–29)
CREAT SERPL-MCNC: 2.06 MG/DL (ref 0.57–1)
CROSSMATCH INTERPRETATION: NORMAL
CROSSMATCH INTERPRETATION: NORMAL
DEPRECATED RDW RBC AUTO: 47.3 FL (ref 37–54)
EGFRCR SERPLBLD CKD-EPI 2021: 21.3 ML/MIN/1.73
ERYTHROCYTE [DISTWIDTH] IN BLOOD BY AUTOMATED COUNT: 14.3 % (ref 12.3–15.4)
GLUCOSE SERPL-MCNC: 84 MG/DL (ref 65–99)
GRAM STN SPEC: ABNORMAL
GRAM STN SPEC: ABNORMAL
HCT VFR BLD AUTO: 25.9 % (ref 34–46.6)
HGB BLD-MCNC: 7.7 G/DL (ref 12–15.9)
ISOLATED FROM: ABNORMAL
MCH RBC QN AUTO: 27 PG (ref 26.6–33)
MCHC RBC AUTO-ENTMCNC: 29.7 G/DL (ref 31.5–35.7)
MCV RBC AUTO: 90.9 FL (ref 79–97)
PHOSPHATE SERPL-MCNC: 3.5 MG/DL (ref 2.5–4.5)
PLATELET # BLD AUTO: 36 10*3/MM3 (ref 140–450)
PMV BLD AUTO: 13 FL (ref 6–12)
POTASSIUM SERPL-SCNC: 3.5 MMOL/L (ref 3.5–5.2)
RBC # BLD AUTO: 2.85 10*6/MM3 (ref 3.77–5.28)
SODIUM SERPL-SCNC: 142 MMOL/L (ref 136–145)
UNIT  ABO: NORMAL
UNIT  ABO: NORMAL
UNIT  RH: NORMAL
UNIT  RH: NORMAL
WBC NRBC COR # BLD AUTO: 9.78 10*3/MM3 (ref 3.4–10.8)

## 2025-07-05 PROCEDURE — 25010000002 POTASSIUM CHLORIDE PER 2 MEQ OF POTASSIUM: Performed by: INTERNAL MEDICINE

## 2025-07-05 PROCEDURE — 99232 SBSQ HOSP IP/OBS MODERATE 35: CPT | Performed by: INTERNAL MEDICINE

## 2025-07-05 PROCEDURE — 95816 EEG AWAKE AND DROWSY: CPT | Performed by: STUDENT IN AN ORGANIZED HEALTH CARE EDUCATION/TRAINING PROGRAM

## 2025-07-05 PROCEDURE — 25010000002 CEFTRIAXONE PER 250 MG: Performed by: INTERNAL MEDICINE

## 2025-07-05 PROCEDURE — 99222 1ST HOSP IP/OBS MODERATE 55: CPT | Performed by: PSYCHIATRY & NEUROLOGY

## 2025-07-05 PROCEDURE — 95816 EEG AWAKE AND DROWSY: CPT

## 2025-07-05 PROCEDURE — 25810000003 LACTATED RINGERS PER 1000 ML: Performed by: INTERNAL MEDICINE

## 2025-07-05 PROCEDURE — 80069 RENAL FUNCTION PANEL: CPT | Performed by: STUDENT IN AN ORGANIZED HEALTH CARE EDUCATION/TRAINING PROGRAM

## 2025-07-05 PROCEDURE — 85027 COMPLETE CBC AUTOMATED: CPT | Performed by: STUDENT IN AN ORGANIZED HEALTH CARE EDUCATION/TRAINING PROGRAM

## 2025-07-05 RX ADMIN — METOPROLOL TARTRATE 12.5 MG: 25 TABLET, FILM COATED ORAL at 10:04

## 2025-07-05 RX ADMIN — METOPROLOL TARTRATE 12.5 MG: 25 TABLET, FILM COATED ORAL at 22:38

## 2025-07-05 RX ADMIN — Medication 10 ML: at 22:42

## 2025-07-05 RX ADMIN — FOLIC ACID 1 MG: 1 TABLET ORAL at 10:04

## 2025-07-05 RX ADMIN — LEVOTHYROXINE SODIUM 75 MCG: 0.07 TABLET ORAL at 06:18

## 2025-07-05 RX ADMIN — CARBIDOPA AND LEVODOPA 1 TABLET: 25; 100 TABLET ORAL at 22:40

## 2025-07-05 RX ADMIN — CEFTRIAXONE 2000 MG: 2 INJECTION, POWDER, FOR SOLUTION INTRAMUSCULAR; INTRAVENOUS at 12:58

## 2025-07-05 RX ADMIN — CARBIDOPA AND LEVODOPA 1 TABLET: 25; 100 TABLET ORAL at 10:04

## 2025-07-05 RX ADMIN — Medication 5000 UNITS: at 12:58

## 2025-07-05 RX ADMIN — POTASSIUM CHLORIDE: 2 INJECTION, SOLUTION, CONCENTRATE INTRAVENOUS at 22:38

## 2025-07-05 RX ADMIN — POTASSIUM CHLORIDE: 2 INJECTION, SOLUTION, CONCENTRATE INTRAVENOUS at 07:24

## 2025-07-05 NOTE — CONSULTS
"Neurology Consult Note    Consult Date: 7/5/2025    Referring MD: Kush Engel MD    Reason for Consult I have been asked to see the patient in neurological consultation to render advice and opinion regarding metabolic encephalopathy    Agueda Krishna is a 89 y.o. 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  She had some obstruction and required cystoscopy and stent placement.  She was quite encephalopathic but apparently improved significantly overnight.  Today she appears to be close to her neurologic baseline according to her son.    Past Medical History:   Diagnosis Date    History of stroke 03/31/2009    Hyperlipidemia     Hypertension     Hypothyroid        Exam  /62 (BP Location: Left arm, Patient Position: Lying)   Pulse 57   Temp 98 °F (36.7 °C) (Oral)   Resp 18   Ht 167.6 cm (66\")   Wt 72.4 kg (159 lb 9.8 oz)   SpO2 97%   BMI 25.76 kg/m²   Gen: NAD, vitals reviewed  MS: Motor aphasia, does follow a few commands, alert, attentive  CN: Visual fields grossly intact, pupils 2 mm, reactive, conjugate gaze, right facial droop, moderate dysarthria  Motor: 4+/5 throughout    DATA:    Lab Results   Component Value Date    GLUCOSE 84 07/05/2025    CALCIUM 7.3 (L) 07/05/2025     07/05/2025    K 3.5 07/05/2025    CO2 23.4 07/05/2025     (H) 07/05/2025    BUN 40.0 (H) 07/05/2025    CREATININE 2.06 (H) 07/05/2025    BCR 19.4 07/05/2025    ANIONGAP 7.6 07/05/2025     Lab Results   Component Value Date    WBC 9.78 07/05/2025    HGB 7.7 (L) 07/05/2025    HCT 25.9 (L) 07/05/2025    MCV 90.9 07/05/2025    PLT 36 (C) 07/05/2025       Lab review: CBC, BMP reviewed, platelets 36, hemoglobin 7.7    Imaging review: I personally reviewed her CT of the head performed yesterday which shows a large chronic infarct affecting most of the left hemisphere.    EEG " showed left temporal sharp waves without electrographic seizures    Diagnoses:  Chronic left MCA stroke  Metabolic encephalopathy  Acute kidney injury  Pyelonephritis    Comment: 89-year-old female with large chronic left MCA stroke admitted for pyelonephritis.  Neurologically she seems significantly improved today according to her son.  She does have aphasia which is a baseline deficit.  She also has significant hearing and vision impairment.  Despite that she is alert and interactive today.  EEG incidentally shows some epileptiform abnormalities.  I would be hesitant to start AEDs at her age without a definite history of seizure.    PLAN:  No further neurologic workup planned

## 2025-07-05 NOTE — PROGRESS NOTES
LOS: 4 days   Patient Care Team:  Bety Mccloud APRN as PCP - General (Nurse Practitioner)    Chief Complaint: Sepsis with urinary tract infection.  Severe thrombocytopenia.    Interval History:  7/3/2025  Patient is afebrile.  Overall feeling better.  Galarza catheter still in place with cloudy lucinda-colored urine.  Platelets marginally better 35,000.    7/4/2025  Patient is afebrile.  Vital stable.  However patient has altered mental status, according to her son who is at the bedside, patient has not said anything since her son came in around 7:30 this morning.  Patient is also not able to recognize me as she was able to for the past 2 days.     Stat CT of the head was negative for intracranial hemorrhage.     7/5/2025  Patient is afebrile.  Mentality returned to baseline (this happened yesterday evening per nursing staff).  She was smiling and able to recognize me.  Stable thrombocytopenia platelets 36,000.  Hemoglobin 7.7.      A comprehensive 14 point review of systems was performed and was negative except as mentioned.    Vital Signs:  Temp:  [97.3 °F (36.3 °C)-98 °F (36.7 °C)] 98 °F (36.7 °C)  Heart Rate:  [56-63] 57  Resp:  [16-18] 18  BP: (135-146)/(48-63) 141/62    Intake/Output Summary (Last 24 hours) at 7/5/2025 0909  Last data filed at 7/5/2025 0616  Gross per 24 hour   Intake 1838.33 ml   Output 1050 ml   Net 788.33 ml       Physical Exam:  GENERAL:  Well-developed, well-nourished in no acute distress.  Mentally returned to baseline.  SKIN:  Warm, dry without rashes.  Large bruises left upper arm likely from IV needle access.  HEENT:  Normocephalic.  She smiles.  CHEST: Normal respiratory effort.  Lungs clear to auscultation. Good airflow.  CARDIAC:  Regular rate and rhythm. Normal S1,S2.  ABDOMEN:  Soft, no tender.    EXTREMITIES:  No lower extremity edema.      Results Review:   CBC:      Lab 07/05/25  0441 07/04/25  0717 07/03/25  0556 07/02/25  1143 07/01/25  2050 07/01/25  1230   WBC 9.78 12.02*  18.94* 23.10*  --  23.41*   HEMOGLOBIN 7.7* 7.5* 8.1* 9.0*  --  9.2*   HEMATOCRIT 25.9* 24.1* 26.7* 29.3*  --  30.4*   PLATELETS 36* 37* 35* 30*  30* 22* 36*   NEUTROS ABS  --  11.18*  --  22.16*  --  22.24*   EOS ABS  --  0.24  --  0.00  --  0.00   MCV 90.9 87.0 89.3 89.1  --  91.8     CMP:        Lab 07/05/25  0441 07/04/25  0717 07/03/25  0556 07/02/25  1143 07/01/25  1230   SODIUM 142 144 144 141 141   POTASSIUM 3.5 3.8 4.3 5.1 4.9   CHLORIDE 111* 114* 113* 109* 111*   CO2 23.4 21.0* 19.7* 21.0* 17.0*   ANION GAP 7.6 9.0 11.3 11.0 13.0   BUN 40.0* 49.0* 56.0* 49.0* 42.0*   CREATININE 2.06* 2.61* 3.13* 2.90* 2.88*   EGFR 21.3* 16.0* 12.9* 14.1* 14.2*   GLUCOSE 84 72 129* 207* 60*   CALCIUM 7.3* 7.2* 7.5* 7.8* 8.2   IONIZED CALCIUM  --   --  1.10* 1.07*  --    MAGNESIUM  --   --   --  1.7  --    PHOSPHORUS 3.5  --   --  5.1*  --    TOTAL PROTEIN  --   --  4.7* 5.1* 5.2*   ALBUMIN 2.1*  --  2.4* 2.5* 2.7*   GLOBULIN  --   --  2.3 2.6 2.5   ALT (SGPT)  --   --  14 24 44*   AST (SGOT)  --   --  31 60* 99*   BILIRUBIN  --   --  <0.2 0.5 0.6   ALK PHOS  --   --  86 104 113   LIPASE  --   --   --   --  16     Lab Results   Component Value Date    [id number] 842 07/02/2025     Lab Results   Component Value Date    FOLATE 4.24 (L) 07/02/2025     Lab Results   Component Value Date    IRON 14 (L) 07/01/2025    LABIRON 4 (L) 07/01/2025    TRANSFERRIN 250 07/01/2025    TIBC 373 07/01/2025    FERRITIN 154.00 (H) 07/01/2025           Results from last 7 days   Lab Units 07/01/25  1324   INR  1.72*   APTT seconds 35.5*         Results from last 7 days   Lab Units 07/02/25  1143   MAGNESIUM mg/dL 1.7           I reviewed the patient's new clinical results.        Assessment:  *.  Sepsis with E. coli bacteremia with leukocytosis.  Patient was hypotensive, and acute renal injury, and altered mental status with lethargic.  Patient has been given IV hydration and IV antibiotics.  Her condition has significant proved according to her  son at the bedside.  Patient herself also reports feeling better.  7/3/2025 patient is afebrile.  Continue IV antibiotics.  7/4/2025 patient is afebrile.  WBCs further improving.  7/5/2025 normalized WBC 9780.  Patient is afebrile.  Mentality has improved back to baseline level.       *.  Left nephrolithiasis, status post cystoscope with stent placement on 7/1/2025.  There was a purulent material from the bladder according to procedure note.      *.  Severe thrombocytopenia on top of chronic thrombocytopenia.  Looking back this patient has low platelets starting at least 2015.   Her son reports a few weeks ago laboratory studies showed thrombocytopenia.  I think this is from [facility] with lab study on 6/11/2025 reporting platelets 90,000.  Per records her platelet count was only 73,000 on 6/9/2025.  I think her chronic thrombocytopenia is related to liver cirrhosis and splenomegaly as evidenced on the CT scan of the abdomen from 7/1/2025.  Her son reports they do not know this information before this admission.   Currently this patient has severe thrombocytopenia on top of chronic thrombocytopenia , which is likely due to thesepsis and acute infection.  Last night platelets was 22,000 however this new laboratory studies showed slightly improved and platelets 30,000 but still with elevated IPF 15.1%.  I do not think this patient has ITP.  Elevated IPF simply indicates this patient is still having some bone marrow response to produce platelets.  There is no need for steroids or transfusion.  7/3/2025 platelets 35,000.  Continue to monitor.  7/4/2025 platelets 37,000.  7/5/2025 platelets 36, 000.  Continue to monitor.        *.  Chronic anemia with evidence of iron deficiency.    This is evidenced by her laboratory studies from [facility] on 6/9/2025 reporting ferritin 40, and the iron saturation was only 5%.  Her baseline hemoglobin was about 10.7.  Currently worsening anemia is due to her infection.   7/1/2025 Hb 9.2.  Her iron studies 7/1/2025 reported higher level of ferritin however low iron saturation.  This fits with inflammatory conditions.    7/2/2025 hemoglobin 9.0.  I explained to patient's son at the bedside, there is no hurry to start the patient on oral iron treatment at this point since she is pretty sick from the sepsis.  When she improves, we could consider starting oral iron treatment because it could have caused nausea constipation.  Her son reports patient already has intermittent constipation and diarrhea.  7/3/2025 hemoglobin 8.1.  7/4/2025 trending down hemoglobin 7.5.   7/5/2025 stable anemia with hemoglobin 7.7.     *.  Folate deficiency.  Laboratory studies today 7/2/2025 reported folate 4.24 ng/mL and normal B12 842 pg/mL.    started folic acid 1 mg daily to help with correction and erythropoiesis.     *.  Altered mental status.  7/4/2025 her son reports patient has not received anything since her son came around 7:30 AM.  Patient also does not recognize me as she used to.  Could this patient having stroke?  Intracranial hemorrhage?  Will obtain stat head CT without contrast.  Stat CT scan 7/4/2025 reported no evidence of intracranial hemorrhage.   7/5/2025 mentality has returned to baseline.  Not sure what happened yesterday causing her altered mental status.    *.  Coagulopathy?  7/1/2025 INR 1.7.  Patient was not on Coumadin.  This is probably due to her severe infection with sepsis picture.  Will repeat laboratory studies tomorrow for evaluation.         PLAN:  Continue IV antibiotics.  Patient is followed by ID service.  Continue folic acid 1 mg daily.  No need for platelet transfusion unless she has evidence of intracranial hemorrhage.  Monitor CBC in the morning. Check INR in AM.  When patient improves further, could consider starting oral iron, or could start as outpatient.    Will follow-up.     I discussed with the patient and her son at bedside about laboratory results and  further management plan.  Her son voiced understanding and agreeable.    I also spoke with nursing staff.      Alexys Castano MD PhD  07/05/25  09:09 EDT

## 2025-07-05 NOTE — PROGRESS NOTES
"RENAL/KCC:     LOS: 4 days   Patient Care Team:  Bety Mccloud APRN as PCP - General (Nurse Practitioner)    Chief Complaint: FAMILIA, obstructive uropathy         History of Present Illness  Agueda Krishna is a 89 y.o. female with h/o ckd III followed by Dr. JAQUI Lang. Admitted with pyelonephritis, familia, and urinary obstruction.  S/p cystoscopy with stent placement.       Subjective  No acute events.   UOP adequate  Patient sleeping    History taken from: patient chart    Objective     Vital Sign Min/Max for last 24 hours  Temp  Min: 97.3 °F (36.3 °C)  Max: 97.7 °F (36.5 °C)   BP  Min: 135/62  Max: 146/63   Pulse  Min: 56  Max: 63   Resp  Min: 16  Max: 18   SpO2  Min: 96 %  Max: 100 %   No data recorded   No data recorded     Flowsheet Rows      Flowsheet Row First Filed Value   Admission Height 167.6 cm (66\") Documented at 07/01/2025 1234   Admission Weight 70.3 kg (155 lb) Documented at 07/01/2025 1234            I/O this shift:  In: 1465 [I.V.:1465]  Out: 1050 [Urine:1050]  I/O last 3 completed shifts:  In: 1161.7 [I.V.:1161.7]  Out: 1150 [Urine:1150]    Objective:  Vital signs: (most recent): Blood pressure 141/62, pulse 57, temperature 98 °F (36.7 °C), temperature source Oral, resp. rate 18, height 167.6 cm (66\"), weight 72.4 kg (159 lb 9.8 oz), SpO2 97%, not currently breastfeeding.            Physical Examination: General appearance - sleeping, NAD   Chest - clear to auscultation, no wheezes, rales or rhonchi, symmetric air entry  Heart - normal rate, regular rhythm, normal S1, S2, no murmurs, rubs, clicks or gallops  Abdomen - soft, nontender, nondistended, no masses or organomegaly  Extremities - peripheral pulses normal, no pedal edema, no clubbing or cyanosis     Results Review:     I reviewed the patient's new clinical results.    WBC WBC   Date Value Ref Range Status   07/05/2025 9.78 3.40 - 10.80 10*3/mm3 Final   07/04/2025 12.02 (H) 3.40 - 10.80 10*3/mm3 Final   07/03/2025 18.94 (H) 3.40 - 10.80 10*3/mm3 " "Final   07/02/2025 23.10 (H) 3.40 - 10.80 10*3/mm3 Final      HGB Hemoglobin   Date Value Ref Range Status   07/05/2025 7.7 (L) 12.0 - 15.9 g/dL Final   07/04/2025 7.5 (L) 12.0 - 15.9 g/dL Final   07/03/2025 8.1 (L) 12.0 - 15.9 g/dL Final   07/02/2025 9.0 (L) 12.0 - 15.9 g/dL Final      HCT Hematocrit   Date Value Ref Range Status   07/05/2025 25.9 (L) 34.0 - 46.6 % Final   07/04/2025 24.1 (L) 34.0 - 46.6 % Final   07/03/2025 26.7 (L) 34.0 - 46.6 % Final   07/02/2025 29.3 (L) 34.0 - 46.6 % Final      Platlets No results found for: \"LABPLAT\"   MCV MCV   Date Value Ref Range Status   07/05/2025 90.9 79.0 - 97.0 fL Final   07/04/2025 87.0 79.0 - 97.0 fL Final   07/03/2025 89.3 79.0 - 97.0 fL Final   07/02/2025 89.1 79.0 - 97.0 fL Final          Sodium Sodium   Date Value Ref Range Status   07/05/2025 142 136 - 145 mmol/L Final   07/04/2025 144 136 - 145 mmol/L Final   07/03/2025 144 136 - 145 mmol/L Final   07/02/2025 141 136 - 145 mmol/L Final      Potassium Potassium   Date Value Ref Range Status   07/05/2025 3.5 3.5 - 5.2 mmol/L Final   07/04/2025 3.8 3.5 - 5.2 mmol/L Final   07/03/2025 4.3 3.5 - 5.2 mmol/L Final   07/02/2025 5.1 3.5 - 5.2 mmol/L Final      Chloride Chloride   Date Value Ref Range Status   07/05/2025 111 (H) 98 - 107 mmol/L Final   07/04/2025 114 (H) 98 - 107 mmol/L Final   07/03/2025 113 (H) 98 - 107 mmol/L Final   07/02/2025 109 (H) 98 - 107 mmol/L Final      CO2 CO2   Date Value Ref Range Status   07/05/2025 23.4 22.0 - 29.0 mmol/L Final   07/04/2025 21.0 (L) 22.0 - 29.0 mmol/L Final   07/03/2025 19.7 (L) 22.0 - 29.0 mmol/L Final   07/02/2025 21.0 (L) 22.0 - 29.0 mmol/L Final      BUN BUN   Date Value Ref Range Status   07/05/2025 40.0 (H) 8.0 - 23.0 mg/dL Final   07/04/2025 49.0 (H) 8.0 - 23.0 mg/dL Final   07/03/2025 56.0 (H) 8.0 - 23.0 mg/dL Final   07/02/2025 49.0 (H) 8.0 - 23.0 mg/dL Final      Creatinine Creatinine   Date Value Ref Range Status   07/05/2025 2.06 (H) 0.57 - 1.00 mg/dL Final " "  07/04/2025 2.61 (H) 0.57 - 1.00 mg/dL Final   07/03/2025 3.13 (H) 0.57 - 1.00 mg/dL Final   07/02/2025 2.90 (H) 0.57 - 1.00 mg/dL Final      Calcium Calcium   Date Value Ref Range Status   07/05/2025 7.3 (L) 8.2 - 9.6 mg/dL Final   07/04/2025 7.2 (L) 8.2 - 9.6 mg/dL Final   07/03/2025 7.5 (L) 8.2 - 9.6 mg/dL Final   07/02/2025 7.8 (L) 8.2 - 9.6 mg/dL Final      PO4 No results found for: \"CAPO4\"   Albumin Albumin   Date Value Ref Range Status   07/05/2025 2.1 (L) 3.5 - 5.2 g/dL Final   07/03/2025 2.4 (L) 3.5 - 5.2 g/dL Final   07/02/2025 2.5 (L) 3.5 - 5.2 g/dL Final      Magnesium Magnesium   Date Value Ref Range Status   07/02/2025 1.7 1.7 - 2.3 mg/dL Final      Uric Acid No results found for: \"URICACID\"     Medication Review:     Current Facility-Administered Medications:     acetaminophen (TYLENOL) tablet 650 mg, 650 mg, Oral, Q4H PRN **OR** acetaminophen (TYLENOL) 160 MG/5ML oral solution 650 mg, 650 mg, Oral, Q4H PRN **OR** acetaminophen (TYLENOL) suppository 650 mg, 650 mg, Rectal, Q4H PRN, Joel Beebe MD    carbidopa-levodopa (SINEMET)  MG per tablet 1 tablet, 1 tablet, Oral, BID, Joel Beebe MD, 1 tablet at 07/04/25 2104    cefTRIAXone (ROCEPHIN) 2,000 mg in sodium chloride 0.9 % 100 mL MBP, 2,000 mg, Intravenous, Q24H, Emeka Broussard MD, Last Rate: 200 mL/hr at 07/04/25 1516, 2,000 mg at 07/04/25 1516    cholecalciferol (VITAMIN D3) tablet 5,000 Units, 5,000 Units, Oral, Daily, Joel Beebe MD, 5,000 Units at 07/03/25 0723    folic acid (FOLVITE) tablet 1 mg, 1 mg, Oral, Daily, Alexys Castano MD PhD, 1 mg at 07/03/25 0723    levothyroxine (SYNTHROID, LEVOTHROID) tablet 75 mcg, 75 mcg, Oral, Q AM, Joel Beebe MD, 75 mcg at 07/04/25 0649    metoprolol tartrate (LOPRESSOR) tablet 12.5 mg, 12.5 mg, Oral, BID, David Sandoval MD, 12.5 mg at 07/04/25 2104    nitroglycerin (NITROSTAT) SL tablet 0.4 mg, 0.4 mg, Sublingual, Q5 Min PRN, Joel Beebe MD    " ondansetron ODT (ZOFRAN-ODT) disintegrating tablet 4 mg, 4 mg, Oral, Q6H PRN **OR** ondansetron (ZOFRAN) injection 4 mg, 4 mg, Intravenous, Q6H PRN, Joel Beebe MD    sodium chloride 0.45 % 925 mL with sodium bicarbonate 8.4 % 75 mEq infusion, , Intravenous, Continuous, Loren Arce MD, Last Rate: 100 mL/hr at 07/04/25 2104, New Bag at 07/04/25 2104    [COMPLETED] Insert Peripheral IV, , , Once **AND** sodium chloride 0.9 % flush 10 mL, 10 mL, Intravenous, PRN, Joel Beebe MD    sodium chloride 0.9 % flush 10 mL, 10 mL, Intravenous, Q12H, Joel Beebe MD, 10 mL at 07/04/25 2104    sodium chloride 0.9 % flush 10 mL, 10 mL, Intravenous, PRN, Joel Beebe MD    sodium chloride 0.9 % infusion 40 mL, 40 mL, Intravenous, PRN, Joel Beebe MD      Assessment & Plan       E. coli UTI (urinary tract infection)    Hypovolemic shock    Acute kidney injury    Left ureteral calculus    Severe sepsis with thrombocytopenia    Folate deficiency    Splenomegaly, congestive, chronic    Cirrhosis of liver with ascites    Iron deficiency anemia      Assessment & Plan  FAMILIA   CKD3 (baseline around 1.2)  Obstructive uropathy s/p stent placement.   Thrombocytopenia   Metabolic acidosis     Renal function improved from 3.1 to 2.6 to 2  UOP improved as well as metabolic acidosis   Change IVF to LR with KCl  Will follow         Tyree Godlman MD  [facility]  07/05/25  06:09 EDT

## 2025-07-05 NOTE — PROGRESS NOTES
Name: Agueda Krishna ADMIT: 2025   : 10/20/1925  PCP: Bety Mccloud APRN    MRN: [id number] LOS: 4 days   AGE/SEX: 89 y.o. female  ROOM: [facility]     Subjective   Subjective     Much more alert and interactive today. Her ct head was negative acutely. Her EEG did show some epileptiform abnormalities. Discussed with Dr. Hurt who did not recommend starting any AEDs.       Objective   Objective   Vital Signs  Temp:  [97.3 °F (36.3 °C)-98 °F (36.7 °C)] 97.9 °F (36.6 °C)  Heart Rate:  [56-63] 57  Resp:  [16-18] 16  BP: (126-146)/(58-63) 126/58  SpO2:  [96 %-100 %] 97 %  on   ;   Device (Oxygen Therapy): room air  Body mass index is 25.76 kg/m².  Physical Exam  Constitutional:       General: She is not in acute distress.     Appearance: She is ill-appearing. She is not toxic-appearing.   Cardiovascular:      Rate and Rhythm: Normal rate and regular rhythm.      Heart sounds: Normal heart sounds.   Pulmonary:      Effort: Pulmonary effort is normal.      Breath sounds: Normal breath sounds.   Abdominal:      General: Bowel sounds are normal.      Palpations: Abdomen is soft.   Musculoskeletal:      Right lower leg: Edema present.      Left lower leg: Edema present.   Neurological:      Mental Status: She is alert. Mental status is at baseline.      Comments: Facial droop  Aphasia   Vision and hearing impairments   Psychiatric:         Mood and Affect: Mood normal.         Behavior: Behavior normal.         Results Review     I reviewed the patient's new clinical results.  Results from last 7 days   Lab Units 25  [id number] 25  1143   WBC 10*3/mm3 9.78 12.02* 18.94* 23.10*   HEMOGLOBIN g/dL 7.7* 7.5* 8.1* 9.0*   PLATELETS 10*3/mm3 36* 37* 35* 30*  30*     Results from last 7 days   Lab Units 25  0441 25  0717 25  0556 25  1143   SODIUM mmol/L 142 144 144 141   POTASSIUM mmol/L 3.5 3.8 4.3 5.1   CHLORIDE mmol/L 111* 114* 113* 109*   CO2 mmol/L 23.4 21.0*  "19.7* 21.0*   BUN mg/dL 40.0* 49.0* 56.0* 49.0*   CREATININE mg/dL 2.06* 2.61* 3.13* 2.90*   GLUCOSE mg/dL 84 72 129* 207*   Estimated Creatinine Clearance: 15.2 mL/min (A) (by C-G formula based on SCr of 2.06 mg/dL (H)).  Results from last 7 days   Lab Units 07/05/25  0441 07/03/25  0556 07/02/25  1143 07/01/25  1230   ALBUMIN g/dL 2.1* 2.4* 2.5* 2.7*   BILIRUBIN mg/dL  --  <0.2 0.5 0.6   ALK PHOS U/L  --  86 104 113   AST (SGOT) U/L  --  31 60* 99*   ALT (SGPT) U/L  --  14 24 44*     Results from last 7 days   Lab Units 07/05/25  0441 07/04/25  0717 07/03/25  0556 07/02/25  1143 07/01/25  1230   CALCIUM mg/dL 7.3* 7.2* 7.5* 7.8* 8.2   ALBUMIN g/dL 2.1*  --  2.4* 2.5* 2.7*   MAGNESIUM mg/dL  --   --   --  1.7  --    PHOSPHORUS mg/dL 3.5  --   --  5.1*  --      Results from last 7 days   Lab Units 07/01/25  2345 07/01/25  2049 07/01/25  1516 07/01/25  1230   PROCALCITONIN ng/mL  --   --   --  95.30*   LACTATE mmol/L 1.7 2.2* 5.5* 4.4*   No results found for: \"COVID19\"  Glucose   Date/Time Value Ref Range Status   07/04/2025 1110 84 70 - 130 mg/dL Final       EEG  Table formatting from the original result was not included.  Date of onset: July 5, 2025  Date of offset: July 5, 2025    Indication:  Metabolic encephalopathy          Technical description:  This is a 21 channel digital EEG recording that   began on 0812 and ended on 0838.     Background:  Up to 8.5 Hz alpha activity is noted over the posterior head   regions that is symmetric, moderately well formed and reactive to eye   closure.  The patient enters the drowsy state and sleeps during the   record.  Sleep activities are symmetric and contain sleep spindles.    Hyperventilation was not performed and photic stimulation was not   performed.  There are no marked continuous focal slowing between the two   hemispheres.  Multiple left temporal sharp waves are seen, some more   well-formed than others.    The EKG monitor shows a heart rate that is bradycardic " during the study.    Clinical Interpretation:  This routine EEG recording is abnormal in the   awake and sleep states due to left temporal sharp waves.  No seizure   activity or focal slowing is present. Focal epileptiform discharges are   suggestive of focal cortical irritability and possess epileptogenic   potential. This is suggestive of but NOT diagnostic of seizure disorder   with focal onset. Clinical correlation is strongly recommended.    Scheduled Medications  carbidopa-levodopa, 1 tablet, Oral, BID  cefTRIAXone, 2,000 mg, Intravenous, Q24H  cholecalciferol, 5,000 Units, Oral, Daily  folic acid, 1 mg, Oral, Daily  levothyroxine, 75 mcg, Oral, Q AM  metoprolol tartrate, 12.5 mg, Oral, BID  sodium chloride, 10 mL, Intravenous, Q12H    Infusions  lactated ringers 990 mL with potassium chloride 20 mEq infusion, , Last Rate: 75 mL/hr at 07/05/25 0724    Diet  Diet: Regular/House; Texture: Soft to Chew (NDD 3); Soft to Chew: Chopped Meat; Fluid Consistency: Nectar Thick       Assessment/Plan     Active Hospital Problems    Diagnosis  POA    **E. coli UTI (urinary tract infection) [N39.0, B96.20]  Yes    Severe sepsis with thrombocytopenia [A41.9, D69.59, R65.20]  Yes    Folate deficiency [E53.8]  Yes    Splenomegaly, congestive, chronic [D73.2]  Yes    Cirrhosis of liver with ascites [K74.60, R18.8]  Yes    Iron deficiency anemia [D50.9]  Yes    Hypovolemic shock [R57.1]  Yes    Acute kidney injury [N17.9]  Yes    Left ureteral calculus [N20.1]  Yes      Resolved Hospital Problems   No resolved problems to display.       89 y.o. female admitted with E. coli UTI (urinary tract infection).    89 year old woman who presented with weakness, vomiting, and diarrhea and was found to have septic shock due to e coli bacteremia/pyelonephritis due to obstructive nephrolithiasis as well as an FAMILIA on CKD 3b    E coli bacteremia/pyelonephritis due to obstructive nephrolithiasis causing septic shock-s/p cystoscopy with  retrograde pyelogram and left stent insertion on 7/1/25. On ceftriaxone per ID, but the organism is pan-sensitive   FAMILIA on CKD 3b-creatinine is down to 2 today starting to improve and is down to 2.61 today from 3.13 yesterday. On LR per nephrology  Acute on chronic thrombocytopenia-chronic thrombocytopenia is likely due to cirrhosis/splenomegaly. Acute thrombocytopenia is related to her infection. Hematology is following. She has been started on supplemental folic acid  Transient lethargy/metabolic encephalopathy-head CT was negative acutely. EEG did show some incidental epileptiform discharges. Neurology doesn't think that there's sufficient evidence of a seizure disorder and so no AEDs are recommended.  Iron deficiency anemia-hgb is 7.7. no iv iron in the setting of bacteremia. Transfuse for hgb <7  Hyperlipidemia-on a statin as an outpatient. Can be restarted at discharge  Cirrhosis-based on imaging with evidence of portal hypertension  Hypothyroidism-synthroid  Parkinson's disease-sinemet  History of CVA with chronic aphasia   Dysphagia-on a regular diet at home. Her diet was modified here earlier in the hospital stay when she was more acutely ill. SLP is planning a VFSS on Monday.   SCDs for DVT prophylaxis.  Limited code (no CPR, no intubation).  Discussed with patient, family, nursing staff, and consulting provider.  Anticipate discharge to SNU facility timing yet to be determined.  Improving, but poor prognosis overall      Anup Mane MD  [facility]  07/05/25  13:54 EDT

## 2025-07-05 NOTE — PROGRESS NOTES
Progress note      Appears well.  Mostly aphasic at baseline ,eating. montes de oca draining     A&P       S/p cystoscopy and left ureteral stent placement    Sepsis    Acute kidney injury, with Mont[place] Oca catheter and clear yellow urine     Plan           -Nephrology following  -Keep montes de oca until Cr closer to baseline  - Ok for void trial prior to discharge  Continue to monitor urine output until creatinine improved  -Abx per primary per ucx -- recommend 10-14 day course  -Patient will be getting staged left ureteroscopy laser lithotripsy stone basket extraction for definitive treatment of stone - schedulers already notified  Likely sometimes next month

## 2025-07-05 NOTE — PLAN OF CARE
Goal Outcome Evaluation:   Patient alert confused follows commands pills whole with applesauce. No c/o pain or nausea noted. Galarza catheter care provided. Incont care and skin care provided. Q2h turn. No acute distress noted will continue to monitor

## 2025-07-06 LAB
ANION GAP SERPL CALCULATED.3IONS-SCNC: 6 MMOL/L (ref 5–15)
BUN SERPL-MCNC: 28 MG/DL (ref 8–23)
BUN/CREAT SERPL: 15.5 (ref 7–25)
CALCIUM SPEC-SCNC: 7.5 MG/DL (ref 8.2–9.6)
CHLORIDE SERPL-SCNC: 114 MMOL/L (ref 98–107)
CO2 SERPL-SCNC: 26 MMOL/L (ref 22–29)
CREAT SERPL-MCNC: 1.81 MG/DL (ref 0.57–1)
DEPRECATED RDW RBC AUTO: 44.8 FL (ref 37–54)
EGFRCR SERPLBLD CKD-EPI 2021: 24.9 ML/MIN/1.73
ERYTHROCYTE [DISTWIDTH] IN BLOOD BY AUTOMATED COUNT: 14.3 % (ref 12.3–15.4)
GLUCOSE SERPL-MCNC: 96 MG/DL (ref 65–99)
HCT VFR BLD AUTO: 26.4 % (ref 34–46.6)
HGB BLD-MCNC: 8.3 G/DL (ref 12–15.9)
INR PPP: 1.2 (ref 0.9–1.1)
MCH RBC QN AUTO: 27.5 PG (ref 26.6–33)
MCHC RBC AUTO-ENTMCNC: 31.4 G/DL (ref 31.5–35.7)
MCV RBC AUTO: 87.4 FL (ref 79–97)
PLATELET # BLD AUTO: 31 10*3/MM3 (ref 140–450)
POTASSIUM SERPL-SCNC: 3.8 MMOL/L (ref 3.5–5.2)
PROTHROMBIN TIME: 15.1 SECONDS (ref 11.7–14.2)
RBC # BLD AUTO: 3.02 10*6/MM3 (ref 3.77–5.28)
SODIUM SERPL-SCNC: 146 MMOL/L (ref 136–145)
WBC NRBC COR # BLD AUTO: 6.72 10*3/MM3 (ref 3.4–10.8)

## 2025-07-06 PROCEDURE — 99232 SBSQ HOSP IP/OBS MODERATE 35: CPT | Performed by: INTERNAL MEDICINE

## 2025-07-06 PROCEDURE — 97530 THERAPEUTIC ACTIVITIES: CPT

## 2025-07-06 PROCEDURE — 80048 BASIC METABOLIC PNL TOTAL CA: CPT | Performed by: INTERNAL MEDICINE

## 2025-07-06 PROCEDURE — 97110 THERAPEUTIC EXERCISES: CPT

## 2025-07-06 PROCEDURE — 25010000002 CEFAZOLIN PER 500 MG: Performed by: INTERNAL MEDICINE

## 2025-07-06 PROCEDURE — 85027 COMPLETE CBC AUTOMATED: CPT | Performed by: INTERNAL MEDICINE

## 2025-07-06 PROCEDURE — 85610 PROTHROMBIN TIME: CPT | Performed by: INTERNAL MEDICINE

## 2025-07-06 RX ORDER — DEXTROSE MONOHYDRATE, SODIUM CHLORIDE, AND POTASSIUM CHLORIDE 50; 1.49; 4.5 G/1000ML; G/1000ML; G/1000ML
75 INJECTION, SOLUTION INTRAVENOUS CONTINUOUS
Status: DISPENSED | OUTPATIENT
Start: 2025-07-06 | End: 2025-07-07

## 2025-07-06 RX ADMIN — CEFAZOLIN 2000 MG: 2 INJECTION, POWDER, FOR SOLUTION INTRAMUSCULAR; INTRAVENOUS at 10:19

## 2025-07-06 RX ADMIN — CEFAZOLIN 2000 MG: 2 INJECTION, POWDER, FOR SOLUTION INTRAMUSCULAR; INTRAVENOUS at 21:32

## 2025-07-06 RX ADMIN — LEVOTHYROXINE SODIUM 75 MCG: 0.07 TABLET ORAL at 06:27

## 2025-07-06 RX ADMIN — CARBIDOPA AND LEVODOPA 1 TABLET: 25; 100 TABLET ORAL at 21:32

## 2025-07-06 RX ADMIN — METOPROLOL TARTRATE 12.5 MG: 25 TABLET, FILM COATED ORAL at 10:19

## 2025-07-06 RX ADMIN — DEXTROSE MONOHYDRATE, SODIUM CHLORIDE, AND POTASSIUM CHLORIDE 75 ML/HR: 50; 1.49; 4.5 INJECTION, SOLUTION INTRAVENOUS at 10:20

## 2025-07-06 RX ADMIN — METOPROLOL TARTRATE 12.5 MG: 25 TABLET, FILM COATED ORAL at 21:32

## 2025-07-06 RX ADMIN — Medication 5000 UNITS: at 10:19

## 2025-07-06 RX ADMIN — FOLIC ACID 1 MG: 1 TABLET ORAL at 10:19

## 2025-07-06 RX ADMIN — CARBIDOPA AND LEVODOPA 1 TABLET: 25; 100 TABLET ORAL at 10:19

## 2025-07-06 RX ADMIN — Medication 10 ML: at 21:32

## 2025-07-06 NOTE — PROGRESS NOTES
LOS: 5 days   Patient Care Team:  Bety Mccloud APRN as PCP - General (Nurse Practitioner)    Chief Complaint: Sepsis with urinary tract infection.  Severe thrombocytopenia.    Interval History:  7/3/2025  Patient is afebrile.  Overall feeling better.  Galarza catheter still in place with cloudy lucinda-colored urine.  Platelets marginally better 35,000.    7/4/2025  Patient is afebrile.  Vital stable.  However patient has altered mental status, according to her son who is at the bedside, patient has not said anything since her son came in around 7:30 this morning.  Patient is also not able to recognize me as she was able to for the past 2 days.     Stat CT of the head was negative for intracranial hemorrhage.     7/5/2025  Patient is afebrile.  Mentality returned to baseline (this happened yesterday evening per nursing staff).  She was smiling and able to recognize me.  Stable thrombocytopenia platelets 36,000.  Hemoglobin 7.7.    7/6/2025   Patient is afebrile.  According to her son who is at bedside, patient is back to her normal mentality.  No visible bleeding.  Slightly worsening platelets 31,000 however improved hemoglobin 8.3.  Further improved WBC.      A comprehensive 14 point review of systems was performed and was negative except as mentioned.    Vital Signs:  Temp:  [97.8 °F (36.6 °C)-98.2 °F (36.8 °C)] 98.2 °F (36.8 °C)  Heart Rate:  [64-68] 68  Resp:  [16-19] 19  BP: (125-167)/(58-71) 148/58    Intake/Output Summary (Last 24 hours) at 7/6/2025 0857  Last data filed at 7/6/2025 0300  Gross per 24 hour   Intake 1142.5 ml   Output 1300 ml   Net -157.5 ml       Physical Exam:  GENERAL:  Well-developed, well-nourished elderly female, in no acute distress.  Patient is asleep.   SKIN:  Warm, dry without rashes.  Large bruises left upper arm.  HEENT:  Normocephalic.    CHEST: Normal respiratory effort.       Results Review:   CBC:      Lab 07/06/25  0618 07/05/25  0441 07/04/25  0717 07/03/25  0556 07/02/25  1143  - goal hemoglobin 10-12,   - most recent iron panel with iron 15, transferrin 138, TIBC 204, 7% saturation and ferritin 378  - transfuse for hemoglobin < 7.0  - defer IV iron in light of recent bacteremia, currently receiving EPO 4,000 units SQ with HD   07/01/25  2050 07/01/25  1230   WBC 6.72 9.78 12.02* 18.94* 23.10*  --  23.41*   HEMOGLOBIN 8.3* 7.7* 7.5* 8.1* 9.0*  --  9.2*   HEMATOCRIT 26.4* 25.9* 24.1* 26.7* 29.3*  --  30.4*   PLATELETS 31* 36* 37* 35* 30*  30*   < > 36*   NEUTROS ABS  --   --  11.18*  --  22.16*  --  22.24*   EOS ABS  --   --  0.24  --  0.00  --  0.00   MCV 87.4 90.9 87.0 89.3 89.1  --  91.8    < > = values in this interval not displayed.     CMP:        Lab 07/06/25  0618 07/05/25  0441 07/04/25  0717 07/03/25  0556 07/02/25  1143 07/01/25  1230   SODIUM 146* 142 144 144 141 141   POTASSIUM 3.8 3.5 3.8 4.3 5.1 4.9   CHLORIDE 114* 111* 114* 113* 109* 111*   CO2 26.0 23.4 21.0* 19.7* 21.0* 17.0*   ANION GAP 6.0 7.6 9.0 11.3 11.0 13.0   BUN 28.0* 40.0* 49.0* 56.0* 49.0* 42.0*   CREATININE 1.81* 2.06* 2.61* 3.13* 2.90* 2.88*   EGFR 24.9* 21.3* 16.0* 12.9* 14.1* 14.2*   GLUCOSE 96 84 72 129* 207* 60*   CALCIUM 7.5* 7.3* 7.2* 7.5* 7.8* 8.2   IONIZED CALCIUM  --   --   --  1.10* 1.07*  --    MAGNESIUM  --   --   --   --  1.7  --    PHOSPHORUS  --  3.5  --   --  5.1*  --    TOTAL PROTEIN  --   --   --  4.7* 5.1* 5.2*   ALBUMIN  --  2.1*  --  2.4* 2.5* 2.7*   GLOBULIN  --   --   --  2.3 2.6 2.5   ALT (SGPT)  --   --   --  14 24 44*   AST (SGOT)  --   --   --  31 60* 99*   BILIRUBIN  --   --   --  <0.2 0.5 0.6   ALK PHOS  --   --   --  86 104 113   LIPASE  --   --   --   --   --  16     Lab Results   Component Value Date    LZGWIMSP42 842 07/02/2025     Lab Results   Component Value Date    FOLATE 4.24 (L) 07/02/2025     Lab Results   Component Value Date    IRON 14 (L) 07/01/2025    LABIRON 4 (L) 07/01/2025    TRANSFERRIN 250 07/01/2025    TIBC 373 07/01/2025    FERRITIN 154.00 (H) 07/01/2025           Results from last 7 days   Lab Units 07/06/25  0618 07/01/25  1324   INR  1.20* 1.72*   APTT seconds  --  35.5*         Results from last 7 days   Lab Units 07/02/25  1143   MAGNESIUM mg/dL 1.7           I reviewed the patient's new clinical  results.        Assessment:  *.  Sepsis with E. coli bacteremia with leukocytosis.  Patient was hypotensive, and acute renal injury, and altered mental status with lethargic.  Patient has been given IV hydration and IV antibiotics.  Her condition has significant proved according to her son at the bedside.  Patient herself also reports feeling better.  7/3/2025 patient is afebrile.  Continue IV antibiotics.  7/4/2025 patient is afebrile.  WBCs further improving.  7/5/2025 normalized WBC 9780.  Patient is afebrile.  Mentality has improved back to baseline level.    7/6/2025 WBC 6720.  Continue IV antibiotics.       *.  Left nephrolithiasis, status post cystoscope with stent placement on 7/1/2025.  There was a purulent material from the bladder according to procedure note.      *.  Severe thrombocytopenia on top of chronic thrombocytopenia.  Looking back this patient has low platelets starting at least 2015.   Her son reports a few weeks ago laboratory studies showed thrombocytopenia.  [facility]11/[facility]25 reporting platelets 90,000.  Per records her platelet count was only 73,000 on 6/9/2025.  I think her chronic thrombocytopenia is related to liver cirrhosis and splenomegaly as evidenced on the CT scan of the abdomen from 7/1/2025.  Her son reports they do not know this information before this admission.   Currently this patient has severe thrombocytopenia on top of chronic thrombocytopenia , which is likely due to thesepsis and acute infection.  Last night platelets was 22,000 however this new laboratory studies showed slightly improved and platelets 30,000 but still with elevated IPF 15.1%.  I do not think this patient has ITP.  Elevated IPF simply indicates this patient is still having some bone marrow response to produce platelets.  There is no need for steroids or transfusion.  7/3/2025 platelets 35,000.  Continue to monitor.  7/4/2025 platelets 37,000.  7/5/2025  platelets 36, 000.    7/6/2025 platelets 31,000.  No evidence for bleeding.  Continue to monitor.        *.  Chronic anemia with evidence of iron deficiency.    This is evidenced by her laboratory studies from [facility] on 6/9/2025 reporting ferritin 40, and iron saturation was only 5%.  Her baseline hemoglobin was about 10.7.  Currently worsening anemia is due to her infection.  7/1/2025 Hb 9.2.  Her iron studies 7/1/2025 reported higher level of ferritin 154 however low iron saturation 4% with free iron 14.  This fits with inflammatory conditions.    7/2/2025 hemoglobin 9.0.  I explained to patient's son at the bedside, there is no hurry to start the patient on oral iron treatment at this point since she is pretty sick from the sepsis.  When she improves, we could consider starting oral iron treatment because it could have caused nausea constipation.  Her son reports patient already has intermittent constipation and diarrhea.  7/3/2025 hemoglobin 8.1.  7/4/2025 trending down hemoglobin 7.5.   7/5/2025 stable anemia with hemoglobin 7.7.  7/6/2025 improving anemia with hemoglobin 8.3.  I double checked with blood bank staff, patient was not given PRBC transfusion recent.         *.  Folate deficiency.  Laboratory studies today 7/2/2025 reported folate 4.24 ng/mL and normal B12 842 pg/mL.    started folic acid 1 mg daily to help with correction and erythropoiesis.     *.  Altered mental status.  7/4/2025 her son reports patient has not received anything since her son came around 7:30 AM.  Patient also does not recognize me as she used to.  Could this patient having stroke?  Intracranial hemorrhage?  Will obtain stat head CT without contrast.  Stat CT scan 7/4/2025 reported no evidence of intracranial hemorrhage.   7/5/2025 mentality has returned to baseline.  Not sure what happened yesterday causing her altered mental status.  7/6/2025 according to her son, patient mentality has returned to baseline, no  recurrent confusion or agitation.    *.  Coagulopathy?  7/1/2025 INR 1.7.  Patient was not on Coumadin.  This is probably due to her severe infection with sepsis picture.  Will repeat laboratory studies tomorrow for evaluation.   7/6/2025 improved INR 1.20 with PT 15.1 seconds.  Continue to monitor.      *.  Acute renal injury, due to sepsis and infection.  7/6/2025 further improved renal function with creatinine 1.81 and BUN 28.0.  Continue follow-up by nephrology service.     PLAN:  Continue IV antibiotics.  Patient is followed by ID service.  Continue folic acid 1 mg daily.  No need for platelet transfusion unless she has evidence of spontaneous hemorrhage, or if she needs to go for procedure, such as lithotripsy.    Monitor CBC in the morning.   When patient improves further, could consider starting oral iron, or could start as outpatient.    Will follow-up.     I discussed with the patient's son at bedside.  Patient is asleep.   Her son voiced understanding and agreeable.      Alexys Castano MD PhD  07/06/25  08:57 EDT

## 2025-07-06 NOTE — PLAN OF CARE
Goal Outcome Evaluation:  Plan of Care Reviewed With: patient, child        Progress: improving  Outcome Evaluation: Pt agreed to PT session , pt tano supine to sit w/min 1, extra time to complete to EOB,  pt tano STS w/CGA , cues for safe hand placement,  pt tano amb ~35ft rwx , assist 1+ son to push IV pole, pt tano LE exer x10 reps , up in chair w/alarm on , son present and supportive, will need continued therapies , fam plans SNU at [place], could tano RHB if SNU bed not available    Anticipated Discharge Disposition (PT): skilled nursing facility, inpatient rehabilitation facility

## 2025-07-06 NOTE — PROGRESS NOTES
"RENAL/KCC:     LOS: 5 days   Patient Care Team:  Bety Mccloud APRN as PCP - General (Nurse Practitioner)    Chief Complaint: FAMILIA, obstructive uropathy       History of Present Illness  Agueda Krishna is a 89 y.o. female with h/o ckd III followed by Dr. JAQUI Lang. Admitted with pyelonephritis, familia, and urinary obstruction.  S/p cystoscopy with stent placement.       Subjective  No acute events.   UOP adequate  Patient sleeping    History taken from: patient chart    Objective     Vital Sign Min/Max for last 24 hours  Temp  Min: 97.8 °F (36.6 °C)  Max: 98 °F (36.7 °C)   BP  Min: 125/71  Max: 167/64   Pulse  Min: 57  Max: 68   Resp  Min: 16  Max: 18   SpO2  Min: 97 %  Max: 100 %   No data recorded   No data recorded     Flowsheet Rows      Flowsheet Row First Filed Value   Admission Height 167.6 cm (66\") Documented at 07/01/2025 1234   Admission Weight 70.3 kg (155 lb) Documented at 07/01/2025 1234            I/O this shift:  In: 1142.5 [I.V.:1142.5]  Out: 350 [Urine:350]  I/O last 3 completed shifts:  In: 1838.3 [I.V.:1838.3]  Out: 2000 [Urine:2000]    Objective:  Vital signs: (most recent): Blood pressure 148/58, pulse 68, temperature 98.2 °F (36.8 °C), temperature source Axillary, resp. rate 19, height 167.6 cm (66\"), weight 72.4 kg (159 lb 9.8 oz), SpO2 100%, not currently breastfeeding.            Physical Examination: General appearance - sleeping, NAD   Chest - clear to auscultation, no wheezes, rales or rhonchi, symmetric air entry  Heart - normal rate, regular rhythm, normal S1, S2, no murmurs, rubs, clicks or gallops  Abdomen - soft, nontender, nondistended, no masses or organomegaly  Extremities - peripheral pulses normal, no pedal edema, no clubbing or cyanosis     Results Review:     I reviewed the patient's new clinical results.    WBC WBC   Date Value Ref Range Status   07/05/2025 9.78 3.40 - 10.80 10*3/mm3 Final   07/04/2025 12.02 (H) 3.40 - 10.80 10*3/mm3 Final      HGB Hemoglobin   Date Value Ref " "Range Status   07/05/2025 7.7 (L) 12.0 - 15.9 g/dL Final   07/04/2025 7.5 (L) 12.0 - 15.9 g/dL Final      HCT Hematocrit   Date Value Ref Range Status   07/05/2025 25.9 (L) 34.0 - 46.6 % Final   07/04/2025 24.1 (L) 34.0 - 46.6 % Final      Platlets No results found for: \"LABPLAT\"   MCV MCV   Date Value Ref Range Status   07/05/2025 90.9 79.0 - 97.0 fL Final   07/04/2025 87.0 79.0 - 97.0 fL Final          Sodium Sodium   Date Value Ref Range Status   07/05/2025 142 136 - 145 mmol/L Final   07/04/2025 144 136 - 145 mmol/L Final      Potassium Potassium   Date Value Ref Range Status   07/05/2025 3.5 3.5 - 5.2 mmol/L Final   07/04/2025 3.8 3.5 - 5.2 mmol/L Final      Chloride Chloride   Date Value Ref Range Status   07/05/2025 111 (H) 98 - 107 mmol/L Final   07/04/2025 114 (H) 98 - 107 mmol/L Final      CO2 CO2   Date Value Ref Range Status   07/05/2025 23.4 22.0 - 29.0 mmol/L Final   07/04/2025 21.0 (L) 22.0 - 29.0 mmol/L Final      BUN BUN   Date Value Ref Range Status   07/05/2025 40.0 (H) 8.0 - 23.0 mg/dL Final   07/04/2025 49.0 (H) 8.0 - 23.0 mg/dL Final      Creatinine Creatinine   Date Value Ref Range Status   07/05/2025 2.06 (H) 0.57 - 1.00 mg/dL Final   07/04/2025 2.61 (H) 0.57 - 1.00 mg/dL Final      Calcium Calcium   Date Value Ref Range Status   07/05/2025 7.3 (L) 8.2 - 9.6 mg/dL Final   07/04/2025 7.2 (L) 8.2 - 9.6 mg/dL Final      PO4 No results found for: \"CAPO4\"   Albumin Albumin   Date Value Ref Range Status   07/05/2025 2.1 (L) 3.5 - 5.2 g/dL Final      Magnesium No results found for: \"MG\"     Uric Acid No results found for: \"URICACID\"     Medication Review:     Current Facility-Administered Medications:     acetaminophen (TYLENOL) tablet 650 mg, 650 mg, Oral, Q4H PRN **OR** acetaminophen (TYLENOL) 160 MG/5ML oral solution 650 mg, 650 mg, Oral, Q4H PRN **OR** acetaminophen (TYLENOL) suppository 650 mg, 650 mg, Rectal, Q4H PRN, Joel Beebe MD    carbidopa-levodopa (SINEMET)  MG per tablet 1 " tablet, 1 tablet, Oral, BID, Joel Beebe MD, 1 tablet at 07/05/25 2240    ceFAZolin 2000 mg IVPB in 100 mL NS (MBP), 2,000 mg, Intravenous, Q12H, Emeka Broussard MD    cholecalciferol (VITAMIN D3) tablet 5,000 Units, 5,000 Units, Oral, Daily, Joel Beebe MD, 5,000 Units at 07/05/25 1258    folic acid (FOLVITE) tablet 1 mg, 1 mg, Oral, Daily, Alexys Castano MD PhD, 1 mg at 07/05/25 1004    lactated ringers 990 mL with potassium chloride 20 mEq infusion, , Intravenous, Continuous, Tyree Goldman MD, Last Rate: 75 mL/hr at 07/05/25 2238, New Bag at 07/05/25 2238    levothyroxine (SYNTHROID, LEVOTHROID) tablet 75 mcg, 75 mcg, Oral, Q AM, Joel Beebe MD, 75 mcg at 07/06/25 0627    metoprolol tartrate (LOPRESSOR) tablet 12.5 mg, 12.5 mg, Oral, BID, David Sandoval MD, 12.5 mg at 07/05/25 2238    nitroglycerin (NITROSTAT) SL tablet 0.4 mg, 0.4 mg, Sublingual, Q5 Min PRN, Joel Beebe MD    ondansetron ODT (ZOFRAN-ODT) disintegrating tablet 4 mg, 4 mg, Oral, Q6H PRN **OR** ondansetron (ZOFRAN) injection 4 mg, 4 mg, Intravenous, Q6H PRN, Joel Beebe MD    [COMPLETED] Insert Peripheral IV, , , Once **AND** sodium chloride 0.9 % flush 10 mL, 10 mL, Intravenous, PRN, Joel Beebe MD    sodium chloride 0.9 % flush 10 mL, 10 mL, Intravenous, Q12H, Joel Beebe MD, 10 mL at 07/05/25 2242    sodium chloride 0.9 % flush 10 mL, 10 mL, Intravenous, PRN, Joel Beebe MD    sodium chloride 0.9 % infusion 40 mL, 40 mL, Intravenous, PRN, [place], Joel YANEZ MD      Assessment & Plan       E. coli UTI (urinary tract infection)    Hypovolemic shock    Acute kidney injury    Left ureteral calculus    Severe sepsis with thrombocytopenia    Folate deficiency    Splenomegaly, congestive, chronic    Cirrhosis of liver with ascites    Iron deficiency anemia      Assessment & Plan  FAMILIA   CKD3 (baseline around 1.2)  Obstructive uropathy s/p stent placement.   Thrombocytopenia    Metabolic acidosis   Hypernatremia    Renal function improved from 3.1 to 2.6 to 2 to 1.8  UOP improved as well as metabolic acidosis   Na 146 - change IVF to 1/2NS today and can likely stop IVF tomorrow  Will follow         Tyree Goldman MD  [facility]  07/06/25  06:53 EDT

## 2025-07-06 NOTE — PROGRESS NOTES
Name: Agueda Krishna ADMIT: 2025   : 10/20/1925  PCP: Rogers BetyJOSESITO lares    MRN: [id number] LOS: 5 days   AGE/SEX: 89 y.o. female  ROOM: [facility]     Subjective   Subjective     No events overnight. She was doing well this morning when I saw her       Objective   Objective   Vital Signs  Temp:  [97.8 °F (36.6 °C)-98.5 °F (36.9 °C)] 98.5 °F (36.9 °C)  Heart Rate:  [64-68] 68  Resp:  [16-19] 16  BP: (125-167)/(58-71) 153/68  SpO2:  [98 %-100 %] 98 %  on   ;   Device (Oxygen Therapy): room air  Body mass index is 25.76 kg/m².  Physical Exam  Constitutional:       General: She is not in acute distress.     Appearance: She is ill-appearing. She is not toxic-appearing.   Cardiovascular:      Rate and Rhythm: Normal rate and regular rhythm.      Heart sounds: Normal heart sounds.   Pulmonary:      Effort: Pulmonary effort is normal.      Breath sounds: Normal breath sounds.   Abdominal:      General: Bowel sounds are normal.      Palpations: Abdomen is soft.   Musculoskeletal:      Right lower leg: Edema present.      Left lower leg: Edema present.   Neurological:      Mental Status: She is alert. Mental status is at baseline.      Comments: Facial droop  Aphasia   Vision and hearing impairments   Psychiatric:         Mood and Affect: Mood normal.         Behavior: Behavior normal.         Results Review     I reviewed the patient's new clinical results.  Results from last 7 days   Lab Units 25  0556   WBC 10*3/mm3 6.72 9.78 12.02* 18.94*   HEMOGLOBIN g/dL 8.3* 7.7* 7.5* 8.1*   PLATELETS 10*3/mm3 31* 36* 37* 35*     Results from last 7 days   Lab Units 25  0556   SODIUM mmol/L 146* 142 144 144   POTASSIUM mmol/L 3.8 3.5 3.8 4.3   CHLORIDE mmol/L 114* 111* 114* 113*   CO2 mmol/L 26.0 23.4 21.0* 19.7*   BUN mg/dL 28.0* 40.0* 49.0* 56.0*   CREATININE mg/dL 1.81* 2.06* 2.61* 3.13*   GLUCOSE mg/dL 96 84 72 129*  "  Estimated Creatinine Clearance: 17.2 mL/min (A) (by C-G formula based on SCr of 1.81 mg/dL (H)).  Results from last 7 days   Lab Units 07/05/25  0441 07/03/25  0556 07/02/25  1143 07/01/25  1230   ALBUMIN g/dL 2.1* 2.4* 2.5* 2.7*   BILIRUBIN mg/dL  --  <0.2 0.5 0.6   ALK PHOS U/L  --  86 104 113   AST (SGOT) U/L  --  31 60* 99*   ALT (SGPT) U/L  --  14 24 44*     Results from last 7 days   Lab Units 07/06/25  0618 07/05/25  0441 07/04/25  0717 07/03/25  0556 07/02/25  1143 07/01/25  1230   CALCIUM mg/dL 7.5* 7.3* 7.2* 7.5* 7.8* 8.2   ALBUMIN g/dL  --  2.1*  --  2.4* 2.5* 2.7*   MAGNESIUM mg/dL  --   --   --   --  1.7  --    PHOSPHORUS mg/dL  --  3.5  --   --  5.1*  --      Results from last 7 days   Lab Units 07/01/25  2345 07/01/25  2049 07/01/25  1516 07/01/25  1230   PROCALCITONIN ng/mL  --   --   --  95.30*   LACTATE mmol/L 1.7 2.2* 5.5* 4.4*   No results found for: \"COVID19\"  Glucose   Date/Time Value Ref Range Status   07/04/2025 1110 84 70 - 130 mg/dL Final       EEG  Table formatting from the original result was not included.  Date of onset: July 5, 2025  Date of offset: July 5, 2025    Indication:  Metabolic encephalopathy          Technical description:  This is a 21 channel digital EEG recording that   began on 0812 and ended on 0838.     Background:  Up to 8.5 Hz alpha activity is noted over the posterior head   regions that is symmetric, moderately well formed and reactive to eye   closure.  The patient enters the drowsy state and sleeps during the   record.  Sleep activities are symmetric and contain sleep spindles.    Hyperventilation was not performed and photic stimulation was not   performed.  There are no marked continuous focal slowing between the two   hemispheres.  Multiple left temporal sharp waves are seen, some more   well-formed than others.    The EKG monitor shows a heart rate that is bradycardic during the study.    Clinical Interpretation:  This routine EEG recording is abnormal in " the   awake and sleep states due to left temporal sharp waves.  No seizure   activity or focal slowing is present. Focal epileptiform discharges are   suggestive of focal cortical irritability and possess epileptogenic   potential. This is suggestive of but NOT diagnostic of seizure disorder   with focal onset. Clinical correlation is strongly recommended.    Scheduled Medications  carbidopa-levodopa, 1 tablet, Oral, BID  ceFAZolin, 2,000 mg, Intravenous, Q12H  cholecalciferol, 5,000 Units, Oral, Daily  folic acid, 1 mg, Oral, Daily  levothyroxine, 75 mcg, Oral, Q AM  metoprolol tartrate, 12.5 mg, Oral, BID  sodium chloride, 10 mL, Intravenous, Q12H    Infusions  dextrose 5 % and sodium chloride 0.45 % with KCl 20 mEq/L, 75 mL/hr, Last Rate: 75 mL/hr (07/06/25 1020)    Diet  Diet: Regular/House; Texture: Soft to Chew (NDD 3); Soft to Chew: Chopped Meat; Fluid Consistency: Nectar Thick       Assessment/Plan     Active Hospital Problems    Diagnosis  POA    **E. coli UTI (urinary tract infection) [N39.0, B96.20]  Yes    Severe sepsis with thrombocytopenia [A41.9, D69.59, R65.20]  Yes    Folate deficiency [E53.8]  Yes    Splenomegaly, congestive, chronic [D73.2]  Yes    Cirrhosis of liver with ascites [K74.60, R18.8]  Yes    Iron deficiency anemia [D50.9]  Yes    Hypovolemic shock [R57.1]  Yes    Acute kidney injury [N17.9]  Yes    Left ureteral calculus [N20.1]  Yes      Resolved Hospital Problems   No resolved problems to display.       89 y.o. female admitted with E. coli UTI (urinary tract infection).    89 year old woman who presented with weakness, vomiting, and diarrhea and was found to have septic shock due to e coli bacteremia/pyelonephritis due to obstructive nephrolithiasis as well as an FAMILIA on CKD 3b    E coli bacteremia/pyelonephritis due to obstructive nephrolithiasis causing septic shock-s/p cystoscopy with retrograde pyelogram and left stent insertion on 7/1/25. On cefazolin per ID  FAMILIA on CKD  3b-creatinine is down to 1.8 today. On 1/2 NS per nephrology  Acute on chronic thrombocytopenia-chronic thrombocytopenia is likely due to cirrhosis/splenomegaly. Acute thrombocytopenia is probably related to her infection. Hematology is following. She has been started on supplemental folic acid  Transient lethargy/metabolic encephalopathy-head CT was negative acutely. EEG did show some incidental epileptiform discharges. Neurology doesn't think that there's sufficient evidence of a seizure disorder and so no AEDs are recommended. Resolved.  Iron deficiency anemia-hgb is 8.3. no iv iron in the setting of bacteremia. Transfuse for hgb <7  Hyperlipidemia-on a statin as an outpatient. Can be restarted at discharge  Cirrhosis-based on imaging with evidence of portal hypertension  Hypothyroidism-synthroid  Parkinson's disease-sinemet  History of CVA with chronic aphasia, partial blindness, hearing loss  Dysphagia-on a regular diet at home. Her diet was modified here earlier in the hospital stay when she was more acutely ill. SLP is planning a VFSS on Monday.   SCDs for DVT prophylaxis.  Limited code (no CPR, no intubation).  Discussed with patient, family, and nursing staff.  Anticipate discharge to SNU facility timing yet to be determined.  Improving, but poor prognosis overall      Anup Mane MD  [facility]  07/06/25  15:01 EDT

## 2025-07-06 NOTE — PLAN OF CARE
Goal Outcome Evaluation:   Patient alert forgetful follows commands incont care and skin care provided. Galarza catheter care provided. Pills whole with applesauce. No c/o pain or nausea. No acute distress noted will continue to monitor

## 2025-07-06 NOTE — THERAPY TREATMENT NOTE
Patient Name: Agueda Krishna  : 10/20/1925    MRN: [id number]                              Today's Date: 2025       Admit Date: 2025    Visit Dx:     ICD-10-CM ICD-9-CM   1. Left ureteral calculus  N20.1 592.1   2. Hypovolemic shock  R57.1 785.59   3. Vomiting and diarrhea  R11.10 787.03    R19.7 787.91   4. Anemia, unspecified type  D64.9 285.9   5. Thrombocytopenia  D69.6 287.5   6. Leukocytosis, unspecified type  D72.829 288.60   7. Lactic acidosis  E87.20 276.2   8. Acute kidney injury  N17.9 584.9     Patient Active Problem List   Diagnosis    Hypovolemic shock    Acute kidney injury    Left ureteral calculus    Severe sepsis with thrombocytopenia    Folate deficiency    Splenomegaly, congestive, chronic    Cirrhosis of liver with ascites    Iron deficiency anemia    E. coli UTI (urinary tract infection)     Past Medical History:   Diagnosis Date    History of stroke 2009    Hyperlipidemia     Hypertension     Hypothyroid      Past Surgical History:   Procedure Laterality Date    CATARACT EXTRACTION Bilateral     CYSTOSCOPY, RETROGRADE PYELOGRAM AND STENT INSERTION Left 2025    Procedure: CYSTOSCOPY, RETROGRADE PYELOGRAM, AND LEFT STENT INSERTION;  Surgeon: Joel Beebe MD;  Location: [facility];  Service: Urology;  Laterality: Left;    HYSTERECTOMY      REPLACEMENT TOTAL KNEE Left     TOTAL HIP ARTHROPLASTY Right       General Information       Row Name 25 1712          Physical Therapy Time and Intention    Document Type therapy note (daily note)  -FRANCOIS     Mode of Treatment individual therapy;physical therapy  -FRANCOIS       Row Name 25 1712          General Information    Patient Profile Reviewed yes  -FRANCOIS     Existing Precautions/Restrictions fall  [place], legally blind, aphasic-but followed commands 95+% of time with extra time  -FRANCOIS       Row Name 25 171          Living Environment    Current Living Arrangements home  -FRANCOIS     People in Home alone  -FRANCOIS       Row Name  07/06/25 1712          Cognition    Orientation Status (Cognition) oriented x 3  needs extra time but doing well  -       Row Name 07/06/25 1712          Safety Issues/Impairments Affecting Functional Mobility    Safety Issues Affecting Function (Mobility) judgment;problem-solving;safety precaution awareness  -     Impairments Affecting Function (Mobility) balance;coordination;strength  -               User Key  (r) = Recorded By, (t) = Taken By, (c) = Cosigned By      Initials Name Provider Type    Phyllis Zayas PTA Physical Therapist Assistant                   Mobility       Row Name 07/06/25 1715          Bed Mobility    Supine-Sit Gage (Bed Mobility) minimum assist (75% patient effort);verbal cues  -     Sit-Supine Gage (Bed Mobility) not tested  -     Assistive Device (Bed Mobility) bed rails;head of bed elevated  -     Comment, (Bed Mobility) req extra time , but knew exactly what to do  -       Row Name 07/06/25 1715          Sit-Stand Transfer    Sit-Stand Gage (Transfers) minimum assist (75% patient effort);verbal cues;nonverbal cues (demo/gesture)  -     Assistive Device (Sit-Stand Transfers) walker, front-wheeled  -       Row Name 07/06/25 1715          Gait/Stairs (Locomotion)    Gage Level (Gait) minimum assist (75% patient effort);contact guard;verbal cues  -     Distance in Feet (Gait) 35  -     Deviations/Abnormal Patterns (Gait) andrei decreased  -     Bilateral Gait Deviations forward flexed posture  slightly  -     Comment, (Gait/Stairs) slight assist to guide rwx through room  -               User Key  (r) = Recorded By, (t) = Taken By, (c) = Cosigned By      Initials Name Provider Type    Phyllis Zayas PTA Physical Therapist Assistant                   Obj/Interventions       Row Name 07/06/25 1717          Motor Skills    Therapeutic Exercise --  APs, QS, hip abd/add, LAQs, seated MIP x10 reps  -               User  Key  (r) = Recorded By, (t) = Taken By, (c) = Cosigned By      Initials Name Provider Type    Phyllis Zayas PTA Physical Therapist Assistant                   Goals/Plan    No documentation.                  Clinical Impression       [facility] Name 07/06/25 1717          Pain    Pretreatment Pain Rating 0/10 - no pain  -     Posttreatment Pain Rating 0/10 - no pain  -     Pre/Posttreatment Pain Comment non-verbal, has a grunting habit, but nods appropriately or points to something needed  -JM       Row Name 07/06/25 1717          Plan of Care Review    Plan of Care Reviewed With patient;child  -     Progress improving  -     Outcome Evaluation Pt agreed to PT session , pt tano supine to sit w/min 1, extra time to complete to EOB,  pt tano STS w/CGA , cues for safe hand placement,  pt tano amb ~35ft rwx , assist 1+ son to push IV pole, pt tano LE exer x10 reps , up in chair w/alarm on , son present and supportive, will need continued therapies , fam plans SNU at [place], could tano RHB if SNU bed not available  -JM       Row Name 07/06/25 1717          Therapy Assessment/Plan (PT)    Rehab Potential (PT) good  -     Criteria for Skilled Interventions Met (PT) yes  -[facility] Name 07/06/25 1717          Vital Signs    O2 Delivery Pre Treatment room air  -JM       Row Name 07/06/25 1717          Positioning and Restraints    Pre-Treatment Position in bed  -     Post Treatment Position chair  -     In Chair reclined;call light within reach;encouraged to call for assist;exit alarm on;notified nsg  -               User Key  (r) = Recorded By, (t) = Taken By, (c) = Cosigned By      Initials Name Provider Type    Phyllis Zayas PTA Physical Therapist Assistant                   Outcome Measures       Row Name 07/06/25 1726          How much help from another person do you currently need...    Turning from your back to your side while in flat bed without using bedrails? 3  -     Moving from lying on back to  sitting on the side of a flat bed without bedrails? 3  -JM     Moving to and from a bed to a chair (including a wheelchair)? 3  -JM     Standing up from a chair using your arms (e.g., wheelchair, bedside chair)? 3  -JM     Climbing 3-5 steps with a railing? 1  -JM     To walk in hospital room? 3  -JM     AM-PAC 6 Clicks Score (PT) 16  -JM     Highest Level of Mobility Goal Stand (1 or More Minutes)-5  -               User Key  (r) = Recorded By, (t) = Taken By, (c) = Cosigned By      Initials Name Provider Type    Phyllis Zayas PTA Physical Therapist Assistant                                 Physical Therapy Education       Title: PT OT SLP Therapies (In Progress)       Topic: Physical Therapy (Done)       Point: Mobility training (Done)       Learning Progress Summary            Patient Acceptance, E,D, DU by  at 7/6/2025 1727   Family Acceptance, E,D, DU by  at 7/6/2025 1727                      Point: Home exercise program (Done)       Learning Progress Summary            Patient Acceptance, E,D, DU by  at 7/6/2025 1727   Family Acceptance, E,D, DU by  at 7/6/2025 1727                      Point: Body mechanics (Done)       Learning Progress Summary            Patient Acceptance, E,D, DU by  at 7/6/2025 1727   Family Acceptance, E,D, DU by  at 7/6/2025 1727                      Point: Precautions (Done)       Learning Progress Summary            Patient Acceptance, E,D, DU by  at 7/6/2025 1727   Family Acceptance, E,D, DU by  at 7/6/2025 1727                                      User Key       Initials Effective Dates Name Provider Type Discipline     03/07/18 -  Phyllis Mccloud PTA Physical Therapist Assistant PT                  PT Recommendation and Plan     Progress: improving  Outcome Evaluation: Pt agreed to PT session , pt tano supine to sit w/min 1, extra time to complete to EOB,  pt tano STS w/CGA , cues for safe hand placement,  pt tano amb ~35ft rwx , assist 1+ son to push IV  pole, pt tano LE exer x10 reps , up in chair w/alarm on , son present and supportive, will need continued therapies , fam plans SNU at [place], could tano RHB if SNU bed not available     Time Calculation:         PT Charges       Row Name 07/06/25 9397             Time Calculation    Start Time 1101  -      Stop Time 1133  -      Time Calculation (min) 32 min  -FRANCOIS      PT Received On 07/06/25  -FRANCOIS      PT - Next Appointment 07/07/25  -FRANCOIS                User Key  (r) = Recorded By, (t) = Taken By, (c) = Cosigned By      Initials Name Provider Type    Phyllis Zayas PTA Physical Therapist Assistant                  Therapy Charges for Today       Code Description Service Date Service Provider Modifiers Qty    29281269164 HC PT THERAPEUTIC ACT EA 15 MIN 7/6/2025 Phyllis Mccloud PTA GP 1    87954660891 HC PT THER PROC EA 15 MIN 7/6/2025 Phyllis Mccloud PTA GP 1            PT G-Codes  Outcome Measure Options: AM-PAC 6 Clicks Basic Mobility (PT)  AM-PAC 6 Clicks Score (PT): 16  AM-PAC 6 Clicks Score (OT): 13  Modified [place] Scale: 4 - Moderately severe disability.  Unable to walk without assistance, and unable to attend to own bodily needs without assistance.  PT Discharge Summary  Anticipated Discharge Disposition (PT): skilled nursing facility, inpatient rehabilitation facility    Phyllis Mccloud PTA  7/6/2025

## 2025-07-07 ENCOUNTER — APPOINTMENT (OUTPATIENT)
Dept: GENERAL RADIOLOGY | Facility: HOSPITAL | Age: OVER 89
DRG: 853 | End: 2025-07-07
Payer: MEDICARE

## 2025-07-07 LAB
ANION GAP SERPL CALCULATED.3IONS-SCNC: 7.5 MMOL/L (ref 5–15)
BUN SERPL-MCNC: 21 MG/DL (ref 8–23)
BUN/CREAT SERPL: 14.8 (ref 7–25)
CALCIUM SPEC-SCNC: 7.6 MG/DL (ref 8.2–9.6)
CHLORIDE SERPL-SCNC: 114 MMOL/L (ref 98–107)
CO2 SERPL-SCNC: 23.5 MMOL/L (ref 22–29)
CREAT SERPL-MCNC: 1.42 MG/DL (ref 0.57–1)
DEPRECATED RDW RBC AUTO: 46.1 FL (ref 37–54)
EGFRCR SERPLBLD CKD-EPI 2021: 33.3 ML/MIN/1.73
ERYTHROCYTE [DISTWIDTH] IN BLOOD BY AUTOMATED COUNT: 14.4 % (ref 12.3–15.4)
GLUCOSE SERPL-MCNC: 117 MG/DL (ref 65–99)
HCT VFR BLD AUTO: 27.6 % (ref 34–46.6)
HGB BLD-MCNC: 8.2 G/DL (ref 12–15.9)
MCH RBC QN AUTO: 26.8 PG (ref 26.6–33)
MCHC RBC AUTO-ENTMCNC: 29.7 G/DL (ref 31.5–35.7)
MCV RBC AUTO: 90.2 FL (ref 79–97)
PLATELET # BLD AUTO: 30 10*3/MM3 (ref 140–450)
POTASSIUM SERPL-SCNC: 3.5 MMOL/L (ref 3.5–5.2)
RBC # BLD AUTO: 3.06 10*6/MM3 (ref 3.77–5.28)
SODIUM SERPL-SCNC: 145 MMOL/L (ref 136–145)
WBC NRBC COR # BLD AUTO: 5.97 10*3/MM3 (ref 3.4–10.8)

## 2025-07-07 PROCEDURE — G0545 PR INHERENT VISIT TO INPT: HCPCS | Performed by: INTERNAL MEDICINE

## 2025-07-07 PROCEDURE — 92611 MOTION FLUOROSCOPY/SWALLOW: CPT

## 2025-07-07 PROCEDURE — 99232 SBSQ HOSP IP/OBS MODERATE 35: CPT | Performed by: INTERNAL MEDICINE

## 2025-07-07 PROCEDURE — 74230 X-RAY XM SWLNG FUNCJ C+: CPT

## 2025-07-07 PROCEDURE — 25010000002 CEFAZOLIN PER 500 MG: Performed by: INTERNAL MEDICINE

## 2025-07-07 PROCEDURE — 85027 COMPLETE CBC AUTOMATED: CPT | Performed by: INTERNAL MEDICINE

## 2025-07-07 PROCEDURE — 80048 BASIC METABOLIC PNL TOTAL CA: CPT | Performed by: INTERNAL MEDICINE

## 2025-07-07 PROCEDURE — 99233 SBSQ HOSP IP/OBS HIGH 50: CPT | Performed by: INTERNAL MEDICINE

## 2025-07-07 RX ORDER — DEXTROSE MONOHYDRATE, SODIUM CHLORIDE, AND POTASSIUM CHLORIDE 50; 1.49; 4.5 G/1000ML; G/1000ML; G/1000ML
75 INJECTION, SOLUTION INTRAVENOUS CONTINUOUS
Status: DISCONTINUED | OUTPATIENT
Start: 2025-07-07 | End: 2025-07-08

## 2025-07-07 RX ADMIN — POTASSIUM CHLORIDE, DEXTROSE MONOHYDRATE AND SODIUM CHLORIDE 75 ML/HR: 150; 5; 450 INJECTION, SOLUTION INTRAVENOUS at 20:25

## 2025-07-07 RX ADMIN — POTASSIUM CHLORIDE, DEXTROSE MONOHYDRATE AND SODIUM CHLORIDE 75 ML/HR: 150; 5; 450 INJECTION, SOLUTION INTRAVENOUS at 16:30

## 2025-07-07 RX ADMIN — ZINC OXIDE 1 APPLICATION: 200 OINTMENT TOPICAL at 22:41

## 2025-07-07 RX ADMIN — Medication 10 ML: at 20:26

## 2025-07-07 RX ADMIN — METOPROLOL TARTRATE 12.5 MG: 25 TABLET, FILM COATED ORAL at 07:34

## 2025-07-07 RX ADMIN — DEXTROSE MONOHYDRATE, SODIUM CHLORIDE, AND POTASSIUM CHLORIDE 75 ML/HR: 50; 1.49; 4.5 INJECTION, SOLUTION INTRAVENOUS at 00:13

## 2025-07-07 RX ADMIN — FOLIC ACID 1 MG: 1 TABLET ORAL at 07:34

## 2025-07-07 RX ADMIN — LEVOTHYROXINE SODIUM 75 MCG: 0.07 TABLET ORAL at 06:13

## 2025-07-07 RX ADMIN — CARBIDOPA AND LEVODOPA 1 TABLET: 25; 100 TABLET ORAL at 07:34

## 2025-07-07 RX ADMIN — METOPROLOL TARTRATE 12.5 MG: 25 TABLET, FILM COATED ORAL at 20:25

## 2025-07-07 RX ADMIN — CEFAZOLIN 2000 MG: 2 INJECTION, POWDER, FOR SOLUTION INTRAMUSCULAR; INTRAVENOUS at 20:23

## 2025-07-07 RX ADMIN — CARBIDOPA AND LEVODOPA 1 TABLET: 25; 100 TABLET ORAL at 20:25

## 2025-07-07 RX ADMIN — Medication 5000 UNITS: at 07:34

## 2025-07-07 RX ADMIN — BARIUM SULFATE 50 ML: 400 SUSPENSION ORAL at 12:16

## 2025-07-07 RX ADMIN — BARIUM SULFATE 55 ML: 0.81 POWDER, FOR SUSPENSION ORAL at 12:16

## 2025-07-07 RX ADMIN — BARIUM SULFATE 4 ML: 980 POWDER, FOR SUSPENSION ORAL at 12:16

## 2025-07-07 RX ADMIN — Medication 10 ML: at 07:37

## 2025-07-07 NOTE — PROGRESS NOTES
Patient Name: Agueda Krishna  YOB: 1925  MRN: [id number]  Admission date: 7/1/2025  Reason for Encounter: Follow-up/Progress Note      [facility] Clinical Nutrition Progress Note       Nutrition Intervention Updates: Continue current nutrition interventions.      Subjective: Checking on po intakes. Pt remains on modified diet. Noted plan for VFSS today to see if diet can be advanced per SLP. Will monitor for diet advancement.       PO Diet: Diet: Regular/House; Texture: Soft to Chew (NDD 3); Soft to Chew: Chopped Meat; Fluid Consistency: Nectar Thick   PO Supplements: None  - declined   PO Intake:  Limited po intakes recorded       Current nutrition support: -   Nutrition support review: -       Labs: Glu 84/96/117, Creat 1.42       GI Function:  Last BM 7/6        Brief Weight Review:    Wt Readings from Last 1 Encounters:   07/02/25 0500 72.4 kg (159 lb 9.8 oz)   07/01/25 2003 76.2 kg (167 lb 15.9 oz)   07/01/25 1234 70.3 kg (155 lb)        Results from last 7 days   Lab Units 07/07/25  0700 07/06/25  0618 07/05/25  0441 07/04/25  0717 07/03/25  0556 07/02/25  1143 07/01/25  1230   SODIUM mmol/L 145 146* 142   < > 144 141 141   POTASSIUM mmol/L 3.5 3.8 3.5   < > 4.3 5.1 4.9   CHLORIDE mmol/L 114* 114* 111*   < > 113* 109* 111*   CO2 mmol/L 23.5 26.0 23.4   < > 19.7* 21.0* 17.0*   BUN mg/dL 21.0 28.0* 40.0*   < > 56.0* 49.0* 42.0*   CREATININE mg/dL 1.42* 1.81* 2.06*   < > 3.13* 2.90* 2.88*   CALCIUM mg/dL 7.6* 7.5* 7.3*   < > 7.5* 7.8* 8.2   BILIRUBIN mg/dL  --   --   --   --  <0.2 0.5 0.6   ALK PHOS U/L  --   --   --   --  86 104 113   ALT (SGPT) U/L  --   --   --   --  14 24 44*   AST (SGOT) U/L  --   --   --   --  31 60* 99*   GLUCOSE mg/dL 117* 96 84   < > 129* 207* 60*    < > = values in this interval not displayed.     Results from last 7 days   Lab Units 07/07/25  0700 07/06/25  0618 07/05/25  0441 07/03/25  0556 07/02/25  1143   MAGNESIUM mg/dL  --   --   --   --  1.7   PHOSPHORUS mg/dL   "--   --  3.5  --  5.1*   PLATELETS 10*3/mm3 30*   < > 36*   < > 30*  30*   HEMOGLOBIN g/dL 8.2*   < > 7.7*   < > 9.0*   HEMATOCRIT % 27.6*   < > 25.9*   < > 29.3*    < > = values in this interval not displayed.     No results found for: \"HGBA1C\"    Electronically signed by:  Bette Mendez RD  07/07/25 09:54 EDT      "

## 2025-07-07 NOTE — PROGRESS NOTES
Name: Agueda Krishna ADMIT: 2025   : 10/20/1925  PCP: Bety Mccloud APRN    MRN: [id number] LOS: 6 days   AGE/SEX: 89 y.o. female  ROOM: [facility]     Subjective   Subjective     Examined at bedside.        Objective   Objective   Vital Signs  Temp:  [97.3 °F (36.3 °C)-98.1 °F (36.7 °C)] 98.1 °F (36.7 °C)  Heart Rate:  [54-65] 60  Resp:  [16] 16  BP: (124-177)/(59-97) 148/67  SpO2:  [98 %-99 %] 98 %  on   ;   Device (Oxygen Therapy): room air  Body mass index is 25.76 kg/m².  Physical Exam  Constitutional:       General: She is not in acute distress.     Appearance: She is ill-appearing. She is not toxic-appearing.   Cardiovascular:      Rate and Rhythm: Normal rate and regular rhythm.      Heart sounds: Normal heart sounds.   Pulmonary:      Effort: Pulmonary effort is normal.      Breath sounds: Normal breath sounds.   Abdominal:      General: Bowel sounds are normal.      Palpations: Abdomen is soft.   Musculoskeletal:      Right lower leg: Edema present.      Left lower leg: Edema present.   Neurological:      Mental Status: She is alert. Mental status is at baseline.      Comments: Facial droop  Aphasia   Vision and hearing impairments   Psychiatric:         Mood and Affect: Mood normal.         Behavior: Behavior normal.         Results Review     I reviewed the patient's new clinical results.  Results from last 7 days   Lab Units 25  0725  0717   WBC 10*3/mm3 5.97 6.72 9.78 12.02*   HEMOGLOBIN g/dL 8.2* 8.3* 7.7* 7.5*   PLATELETS 10*3/mm3 30* 31* 36* 37*     Results from last 7 days   Lab Units 25  0725  04425  0717   SODIUM mmol/L 145 146* 142 144   POTASSIUM mmol/L 3.5 3.8 3.5 3.8   CHLORIDE mmol/L 114* 114* 111* 114*   CO2 mmol/L 23.5 26.0 23.4 21.0*   BUN mg/dL 21.0 28.0* 40.0* 49.0*   CREATININE mg/dL 1.42* 1.81* 2.06* 2.61*   GLUCOSE mg/dL 117* 96 84 72   Estimated Creatinine Clearance: 22 mL/min (A) (by C-G  "formula based on SCr of 1.42 mg/dL (H)).  Results from last 7 days   Lab Units 07/05/25  0441 07/03/25  0556 07/02/25  1143 07/01/25  1230   ALBUMIN g/dL 2.1* 2.4* 2.5* 2.7*   BILIRUBIN mg/dL  --  <0.2 0.5 0.6   ALK PHOS U/L  --  86 104 113   AST (SGOT) U/L  --  31 60* 99*   ALT (SGPT) U/L  --  14 24 44*     Results from last 7 days   Lab Units 07/07/25  0700 07/06/25  0618 07/05/25  0441 07/04/25  0717 07/03/25  0556 07/02/25  1143 07/01/25  1230   CALCIUM mg/dL 7.6* 7.5* 7.3* 7.2* 7.5* 7.8* 8.2   ALBUMIN g/dL  --   --  2.1*  --  2.4* 2.5* 2.7*   MAGNESIUM mg/dL  --   --   --   --   --  1.7  --    PHOSPHORUS mg/dL  --   --  3.5  --   --  5.1*  --      Results from last 7 days   Lab Units 07/01/25  2345 07/01/25  2049 07/01/25  1516 07/01/25  1230   PROCALCITONIN ng/mL  --   --   --  95.30*   LACTATE mmol/L 1.7 2.2* 5.5* 4.4*   No results found for: \"COVID19\"  No results found for: \"HGBA1C\", \"POCGLU\"      EEG  Table formatting from the original result was not included.  Date of onset: July 5, 2025  Date of offset: July 5, 2025    Indication:  Metabolic encephalopathy          Technical description:  This is a 21 channel digital EEG recording that   began on 0812 and ended on 0838.     Background:  Up to 8.5 Hz alpha activity is noted over the posterior head   regions that is symmetric, moderately well formed and reactive to eye   closure.  The patient enters the drowsy state and sleeps during the   record.  Sleep activities are symmetric and contain sleep spindles.    Hyperventilation was not performed and photic stimulation was not   performed.  There are no marked continuous focal slowing between the two   hemispheres.  Multiple left temporal sharp waves are seen, some more   well-formed than others.    The EKG monitor shows a heart rate that is bradycardic during the study.    Clinical Interpretation:  This routine EEG recording is abnormal in the   awake and sleep states due to left temporal sharp waves.  No " seizure   activity or focal slowing is present. Focal epileptiform discharges are   suggestive of focal cortical irritability and possess epileptogenic   potential. This is suggestive of but NOT diagnostic of seizure disorder   with focal onset. Clinical correlation is strongly recommended.    Scheduled Medications  carbidopa-levodopa, 1 tablet, Oral, BID  ceFAZolin, 2,000 mg, Intravenous, Q12H  cholecalciferol, 5,000 Units, Oral, Daily  folic acid, 1 mg, Oral, Daily  hydrocortisone-bacitracin-zinc oxide-nystatin, 1 Application, Topical, BID  levothyroxine, 75 mcg, Oral, Q AM  metoprolol tartrate, 12.5 mg, Oral, BID  sodium chloride, 10 mL, Intravenous, Q12H    Infusions  dextrose 5 % and sodium chloride 0.45 % with KCl 20 mEq/L, 75 mL/hr    Diet  Diet: Regular/House; Texture: Soft to Chew (NDD 3); Soft to Chew: Chopped Meat; Fluid Consistency: Nectar Thick       Assessment/Plan     Active Hospital Problems    Diagnosis  POA    **E. coli UTI (urinary tract infection) [N39.0, B96.20]  Yes    Severe sepsis with thrombocytopenia [A41.9, D69.59, R65.20]  Yes    Folate deficiency [E53.8]  Yes    Splenomegaly, congestive, chronic [D73.2]  Yes    Cirrhosis of liver with ascites [K74.60, R18.8]  Yes    Iron deficiency anemia [D50.9]  Yes    Hypovolemic shock [R57.1]  Yes    Acute kidney injury [N17.9]  Yes    Left ureteral calculus [N20.1]  Yes      Resolved Hospital Problems   No resolved problems to display.       89 y.o. female admitted with E. coli UTI (urinary tract infection).    89 year old woman who presented with weakness, vomiting, and diarrhea and was found to have septic shock due to e coli bacteremia/pyelonephritis due to obstructive nephrolithiasis as well as an FAMILIA on CKD 3b    E coli bacteremia/pyelonephritis due to obstructive nephrolithiasis causing septic shock-s/p cystoscopy with retrograde pyelogram and left stent insertion on 7/1/25. On cefazolin per ID  FAMILIA on CKD 3b-creatinine is down to 1.8 today.  On 1/2 NS per nephrology  Acute on chronic thrombocytopenia-chronic thrombocytopenia is likely due to cirrhosis/splenomegaly. Acute thrombocytopenia is probably related to her infection. Hematology is following. She has been started on supplemental folic acid  Transient lethargy/metabolic encephalopathy-head CT was negative acutely. EEG did show some incidental epileptiform discharges. Neurology doesn't think that there's sufficient evidence of a seizure disorder and so no AEDs are recommended. Resolved.  Iron deficiency anemia-hgb is 8.3. no iv iron in the setting of bacteremia. Transfuse for hgb <7  Hyperlipidemia-on a statin as an outpatient. Can be restarted at discharge  Cirrhosis-based on imaging with evidence of portal hypertension  Hypothyroidism-synthroid  Parkinson's disease-sinemet  History of CVA with chronic aphasia, partial blindness, hearing loss  Dysphagia-on a regular diet at home. Her diet was modified here earlier in the hospital stay when she was more acutely ill. SLP is planning a VFSS on Monday.   SCDs for DVT prophylaxis.  Limited code (no CPR, no intubation).  Discussed with patient, family, and nursing staff.  Anticipate discharge to SNU facility timing yet to be determined.  Improving, but poor prognosis overall      Trace Erickson MD  [facility]  07/07/25  15:36 EDT

## 2025-07-07 NOTE — PROGRESS NOTES
[facility] INPATIENT PROGRESS NOTE    Length of Stay:  6 days    CHIEF COMPLAINT:  Septic shock with pyelonephritis, E. coli bacteremia, obstructive nephrolithiasis, FAMILIA/CKD3a, acute on chronic thrombocytopenia, anemia, cirrhosis, Parkinson's disease, history of stroke    SUBJECTIVE:   Patient awake and alert, attempting to communicate.  Patient's son is at the bedside.  No bleeding issues.    ROS:  Review of Systems   Limited review of systems was obtained with pertinent positive findings as noted interval history above.  All other systems negative.    OBJECTIVE:  Vitals:    07/06/25 1922 07/06/25 2322 07/07/25 0725 07/07/25 0732   BP: 124/97 177/59 148/67    BP Location: Left arm Left arm Left arm    Patient Position: Lying Lying Lying    Pulse: 65 54 58 60   Resp: 16 16 16    Temp: 97.3 °F (36.3 °C) 97.3 °F (36.3 °C) 98.1 °F (36.7 °C)    TempSrc: Oral Oral Oral    SpO2: 99% 99% 98%    Weight:       Height:             PHYSICAL EXAMINATION:  General: Awake and alert, no distress  Chest/Lungs: A few scattered crackles bilaterally anteriorly  Heart: Regular rate and rhythm  Abdomen/GI: Soft nontender nondistended bowel sounds present  Extremities: Trace-1+ bilateral lower extremity edema    DIAGNOSTIC DATA:  Results Review:     I reviewed the patient's new clinical results.    Results from last 7 days   Lab Units 07/06/25  0618 07/05/25  0441 07/04/25  0717   WBC 10*3/mm3 6.72 9.78 12.02*   HEMOGLOBIN g/dL 8.3* 7.7* 7.5*   HEMATOCRIT % 26.4* 25.9* 24.1*   PLATELETS 10*3/mm3 31* 36* 37*      Results from last 7 days   Lab Units 07/07/25  0700 07/06/25  0618 07/05/25  0441 07/04/25  0717 07/03/25  0556 07/02/25  1143 07/01/25  1230   SODIUM mmol/L 145 146* 142   < > 144 141 141   POTASSIUM mmol/L 3.5 3.8 3.5   < > 4.3 5.1 4.9   CHLORIDE mmol/L 114* 114* 111*   < > 113* 109* 111*   CO2 mmol/L 23.5 26.0 23.4   < > 19.7* 21.0* 17.0*   BUN mg/dL 21.0 28.0* 40.0*   < > 56.0* 49.0* 42.0*   CREATININE mg/dL  1.42* 1.81* 2.06*   < > 3.13* 2.90* 2.88*   CALCIUM mg/dL 7.6* 7.5* 7.3*   < > 7.5* 7.8* 8.2   BILIRUBIN mg/dL  --   --   --   --  <0.2 0.5 0.6   ALK PHOS U/L  --   --   --   --  86 104 113   ALT (SGPT) U/L  --   --   --   --  14 24 44*   AST (SGOT) U/L  --   --   --   --  31 60* 99*   GLUCOSE mg/dL 117* 96 84   < > 129* 207* 60*    < > = values in this interval not displayed.      Lab Results   Component Value Date    NEUTROABS 11.18 (H) 07/04/2025     Results from last 7 days   Lab Units 07/06/25  0618 07/01/25  1324   INR  1.20* 1.72*   APTT seconds  --  35.5*     Results from last 7 days   Lab Units 07/02/25  1143   MAGNESIUM mg/dL 1.7           Assessment & Plan   ASSESSMENT/PLAN:  This is a 89 y.o. female with:     *Septic shock with pyelonephritis, E. coli bacteremia, obstructive nephrolithiasis  CT abdomen pelvis 7/1/2025 with 2.1 cm stone left renal pelvis with left pelviectasis and perinephric fat stranding suggesting obstruction and UTI/pyelonephritis, bladder wall thickening and perivesical fat stranding possibly secondary to cystitis.  Blood cultures 7/1/2025 positive for E. Coli  Patient currently continuing on cefazolin  Infectious disease following    *FAMILIA/CKD3a  Baseline creatinine around 1.2  On admission creatinine 2.88 with increased to 3.13 on 7/3/2025  Obstructive uropathy status post left ureteral stent placement on 7/1/2025  Creatinine improved to 2.06 today.    *Acute on chronic thrombocytopenia  Patient with chronic thrombocytopenia dating back to at least 2015 with platelet count in the low to mid 100,000 range  Evidence of underlying cirrhosis on imaging studies which may be contributing factor to chronic thrombocytopenia  Platelet count prior to admission on 6/4/25 was 84,000  Platelet count on admission is 7/1/2025 was 36,000, decline to 22,000 following admission.  IPF elevated at 13.5%  Additional labs on 7/2/2025 with B12 842, folate low at 4.24, LDH borderline elevated at 231, IPF  elevated at 15.1%  Acute on chronic thrombocytopenia felt to likely be consumptive in nature related to current infection/sepsis  Platelet count has stabilized in the 30-40,000 range, currently decreased slightly at 30,000.  Patient with no current bleeding complications.  No current need for transfusion.  Recheck IPF and check fibrinogen in a.m.    *Anemia  Patient with history of anemia, labs prior to admission on 6/9/2025 with hemoglobin 10.5, MCV 88.6, iron 18, ferritin 40, iron saturation 5%, TIBC 369 consistent with iron deficiency anemia  On admission 7/1/2025 hemoglobin was 9.2  Additional labs on 7/1/2025 with iron 14, ferritin 154, iron saturation 4%, TIBC 373, B12 842, folate low at 4.24  Stool for occult blood positive on 7/2/2025.  Patient is not a candidate for endoscopic evaluation  Patient is receiving oral folic acid 1 mg daily for folate deficiency  Hemoglobin did decline down to 7.5 on 7//25  Hemoglobin currently stable at 8.2.  With low iron saturation at 4%, some suspicion for concurrent iron deficiency, particularly in light of stool positive for occult blood.  Consider use of oral iron pending decisions regarding swallowing    *Cirrhosis  CT abdomen pelvis on 7/1/2025 with heterogeneous hepatic attenuation with subtle nodular liver contours possibly reflecting underlying hepatocellular disease/cirrhosis with evidence of portal venous hypertension including splenomegaly (14.5 cm), recanalization of periumbilical vein, trace ascites.  Viral hepatitis A, B, C panel negative on 7/2/2025  Possibly contributing factors of the patient's chronic mild thrombocytopenia    *Parkinson's disease  Carbidopa/levodopa 25/100 twice daily    *History of stroke      Plan:  No current need for platelet transfusion unless bleeding occurs  Patient continuing on cefazolin  Check fibrinogen and IPF with a.m. labs  Daily CBC/differential    Discussed with patient and son at bedside           Ernesto Gilmore MD

## 2025-07-07 NOTE — PROGRESS NOTES
ID note for bacteremia  S: No pain. AF. Tolerating abx.    Physical Exam:   Vital Signs   Temp:  [97.3 °F (36.3 °C)-98.5 °F (36.9 °C)] 98.1 °F (36.7 °C)  Heart Rate:  [54-65] 60  Resp:  [16] 16  BP: (124-177)/(59-97) 148/67    GENERAL: Awake and alert, in no acute distress.   HEENT: Oropharynx is clear. Hearing is grossly [place].   EYES: . No conjunctival injection. No lid lag.   LUNGS:normal respiratory effort.   SKIN: no cutaneous eruptions in exposed areas       Results Review:  Cr 1.4  WBC 5.97, plt 30  Blood culture 1/2 E. coli      A/p  E. coli bacteremia secondary to pyelonephritis and obstructing nephrolithiasis status post stent placement  Acute kidney injury on chronic kidney disease stage 3, antibiotics dosed appropriately  Thrombocytopenia secondary to sepsis    Cont renally dosed cefazolin 2g IV q12 thru 7/8  If dc beforehand can can change to po cephalexin  Check BMP and CBC in am for close abx monitoring to avoid toxicities in setting of inocencio  D/w son this AM                                                          The above decisions regarding antimicrobials incorporates elements of engaging in complex medical decision-making associated with antimicrobial prescribing including considerations like antimicrobial resistance patterns, interactions/complications from comorbidities including concurrent infections, public health considerations to minimize development of antimicrobial resistance, recent antibiotic exposure,

## 2025-07-07 NOTE — PLAN OF CARE
Goal Outcome Evaluation:              Outcome Evaluation: VFSS completed. Pt had moderate pharyngeal residuals across trials that she could not clear with a cued swallow. Pt had trace silent penetration before and during the swallow inconsistently across all consistencies with trace silent aspiration from residuals, increasing with fatigue. Pt with noted slight stridor that increased during the study. Pt was unable to follow commands to dry swallow, clear throat, or cough which increased risk and led to further silent penetration/aspiration. Recommend NPO w/ alternative means for nutrition and meds. GOC are pending. Discussed w/ RN. ST to follow.

## 2025-07-07 NOTE — PLAN OF CARE
Problem: Adult Inpatient Plan of Care  Goal: Plan of Care Review  Outcome: Progressing  Flowsheets (Taken 7/7/2025 1506)  Progress: improving  Outcome Evaluation: Patient has been pleasant during shift. Cooperative. AOx?, SR, room air, Assist x1. Son at bedside. F/C. IVFs stopped. Failed VFSS, Dr. Erickson will speak with son about results. No complaints of pain, nausea or SOA. Will continue to monitor and assist patient as needed.  Plan of Care Reviewed With: patient  Goal: Patient-Specific Goal (Individualized)  Outcome: Progressing  Goal: Absence of Hospital-Acquired Illness or Injury  Outcome: Progressing  Intervention: Identify and Manage Fall Risk  Recent Flowsheet Documentation  Taken 7/7/2025 1400 by Forest Rae RN  Safety Promotion/Fall Prevention:   assistive device/personal items within reach   fall prevention program maintained   nonskid shoes/slippers when out of bed   safety round/check completed  Taken 7/7/2025 1200 by Forest Rae RN  Safety Promotion/Fall Prevention:   assistive device/personal items within reach   fall prevention program maintained   nonskid shoes/slippers when out of bed   safety round/check completed  Taken 7/7/2025 1000 by Forest Rae RN  Safety Promotion/Fall Prevention:   assistive device/personal items within reach   fall prevention program maintained   nonskid shoes/slippers when out of bed   safety round/check completed  Taken 7/7/2025 0740 by Forest aRe RN  Safety Promotion/Fall Prevention:   assistive device/personal items within reach   fall prevention program maintained   nonskid shoes/slippers when out of bed   safety round/check completed  Intervention: Prevent Skin Injury  Recent Flowsheet Documentation  Taken 7/7/2025 1400 by Forest Rae RN  Body Position: supine  Skin Protection: incontinence pads utilized  Taken 7/7/2025 1200 by Forest Rae RN  Body Position:   right   turned  Taken 7/7/2025 1000 by Forest Rae RN  Body  Position: supine  Taken 7/7/2025 0740 by Forest Rae RN  Body Position: supine  Skin Protection: incontinence pads utilized  Goal: Optimal Comfort and Wellbeing  Outcome: Progressing  Goal: Readiness for Transition of Care  Outcome: Progressing     Problem: Sepsis/Septic Shock  Goal: Optimal Coping  Outcome: Progressing  Goal: Absence of Bleeding  Outcome: Progressing  Goal: Blood Glucose Level Within Target Range  Outcome: Progressing  Goal: Absence of Infection Signs and Symptoms  Outcome: Progressing  Goal: Optimal Nutrition Delivery  Outcome: Progressing     Problem: Comorbidity Management  Goal: Blood Pressure in Desired Range  Outcome: Progressing     Problem: Fall Injury Risk  Goal: Absence of Fall and Fall-Related Injury  Outcome: Progressing  Intervention: Promote Injury-Free Environment  Recent Flowsheet Documentation  Taken 7/7/2025 1400 by Forest Rae RN  Safety Promotion/Fall Prevention:   assistive device/personal items within reach   fall prevention program maintained   nonskid shoes/slippers when out of bed   safety round/check completed  Taken 7/7/2025 1200 by Forest Rae RN  Safety Promotion/Fall Prevention:   assistive device/personal items within reach   fall prevention program maintained   nonskid shoes/slippers when out of bed   safety round/check completed  Taken 7/7/2025 1000 by Forest Rae RN  Safety Promotion/Fall Prevention:   assistive device/personal items within reach   fall prevention program maintained   nonskid shoes/slippers when out of bed   safety round/check completed  Taken 7/7/2025 0740 by Forest Rae RN  Safety Promotion/Fall Prevention:   assistive device/personal items within reach   fall prevention program maintained   nonskid shoes/slippers when out of bed   safety round/check completed     Problem: Skin Injury Risk Increased  Goal: Skin Health and Integrity  Outcome: Progressing  Intervention: Optimize Skin Protection  Recent Flowsheet  Documentation  Taken 7/7/2025 1400 by Forest Rae RN  Pressure Reduction Techniques:   frequent weight shift encouraged   weight shift assistance provided  Head of Bed (HOB) Positioning: HOB at 30 degrees  Pressure Reduction Devices: pressure-redistributing mattress utilized  Skin Protection: incontinence pads utilized  Taken 7/7/2025 1200 by Forest Rae RN  Head of Bed (HOB) Positioning: HOB at 30 degrees  Taken 7/7/2025 1000 by Forest Rae RN  Head of Bed (HOB) Positioning: HOB at 30 degrees  Taken 7/7/2025 0740 by Forest Rae RN  Pressure Reduction Techniques:   frequent weight shift encouraged   weight shift assistance provided  Head of Bed (HOB) Positioning: HOB at 30 degrees  Pressure Reduction Devices: pressure-redistributing mattress utilized  Skin Protection: incontinence pads utilized     Problem: UTI (Urinary Tract Infection)  Goal: Improved Infection Symptoms  Outcome: Progressing   Goal Outcome Evaluation:  Plan of Care Reviewed With: patient        Progress: improving  Outcome Evaluation: Patient has been pleasant during shift. Cooperative. AOx?, SR, room air, Assist x1. Son at bedside. F/C. IVFs stopped. Failed VFSS, Dr. Erickson will speak with son about results. No complaints of pain, nausea or SOA. Will continue to monitor and assist patient as needed.

## 2025-07-07 NOTE — MBS/VFSS/FEES
Acute Care - Speech Language Pathology   Swallow Initial Evaluation [facility]     Patient Name: Agueda Krishna  : 10/20/1925  MRN: [id number]  Today's Date: 2025               Admit Date: 2025    Visit Dx:     ICD-10-CM ICD-9-CM   1. Left ureteral calculus  N20.1 592.1   2. Hypovolemic shock  R57.1 785.59   3. Vomiting and diarrhea  R11.10 787.03    R19.7 787.91   4. Anemia, unspecified type  D64.9 285.9   5. Thrombocytopenia  D69.6 287.5   6. Leukocytosis, unspecified type  D72.829 288.60   7. Lactic acidosis  E87.20 276.2   8. Acute kidney injury  N17.9 584.9     Patient Active Problem List   Diagnosis    Hypovolemic shock    Acute kidney injury    Left ureteral calculus    Severe sepsis with thrombocytopenia    Folate deficiency    Splenomegaly, congestive, chronic    Cirrhosis of liver with ascites    Iron deficiency anemia    E. coli UTI (urinary tract infection)     Past Medical History:   Diagnosis Date    History of stroke 2009    Hyperlipidemia     Hypertension     Hypothyroid      Past Surgical History:   Procedure Laterality Date    CATARACT EXTRACTION Bilateral     CYSTOSCOPY, RETROGRADE PYELOGRAM AND STENT INSERTION Left 2025    Procedure: CYSTOSCOPY, RETROGRADE PYELOGRAM, AND LEFT STENT INSERTION;  Surgeon: Joel Beebe MD;  Location: [facility];  Service: Urology;  Laterality: Left;    HYSTERECTOMY      REPLACEMENT TOTAL KNEE Left     TOTAL HIP ARTHROPLASTY Right        SLP Recommendation and Plan  SLP Swallowing Diagnosis: severe, oral dysphagia, pharyngeal dysphagia (25 1200)  SLP Diet Recommendation: NPO, ice chips between meals after oral care, with supervision, water between meals after oral care, with supervision, temporary alternate methods of nutrition/hydration, other (see comments) (question GOC) (25 1200)     SLP Rec. for Method of Medication Administration: meds via alternate route (25)     Monitor for Signs of Aspiration: yes, notify  SLP if any concerns (07/07/25 1200)     Swallow Criteria for Skilled Therapeutic Interventions Met: demonstrates skilled criteria (07/07/25 1200)  Anticipated Discharge Disposition (SLP): unknown (07/07/25 1200)  Rehab Potential/Prognosis, Swallowing: re-evaluate goals as necessary (07/07/25 1200)  Therapy Frequency (Swallow): PRN (07/07/25 1200)  Predicted Duration Therapy Intervention (Days): until discharge (07/07/25 1200)  Oral Care Recommendations: Oral Care BID/PRN (07/07/25 1200)                                        Outcome Evaluation: VFSS completed. Pt had moderate pharyngeal residuals across trials that she could not clear with a cued swallow. Pt had trace silent penetration before and during the swallow inconsistently across all consistencies with trace silent aspiration from residuals, increasing with fatigue. Pt with noted slight stridor that increased during the study. Pt was unable to follow commands to dry swallow, clear throat, or cough which increased risk and led to further silent penetration/aspiration. Recommend NPO w/ alternative means for nutrition and meds. GOC are pending. Discussed w/ RN. ST to follow.      SWALLOW EVALUATION (Last 72 Hours)       SLP Adult Swallow Evaluation       Row Name 07/07/25 1200                   Rehab Evaluation    Document Type evaluation  -AW        Subjective Information no complaints  -AW        Patient Observations alert;cooperative;agree to therapy  -AW        Patient/Family/Caregiver Comments/Observations Pt is aphasic and not able to follow commands consistently.  -AW        Patient Effort good  -AW        Symptoms Noted During/After Treatment none  -AW           General Information    Patient Profile Reviewed yes  -AW        Pertinent History Of Current Problem Pt admitted with E coli bacteremia/pyelonephritis due to obstructive nephrolithiasis causing septic shock-s/p cystoscopy with retrograde pyelogram and left stent insertion on 7/1/25. PMH: ROSALVA w/  h/o dysphagia with PEG; Parkinson's.  -AW        Current Method of Nutrition soft to chew textures;chopped;nectar/syrup-thick liquids;other (see comments)  Pt was on regular and thin at home. Family feels she is at baseline.  -AW        Precautions/Limitations, Vision vision impairment, bilaterally  -AW        Precautions/Limitations, Hearing hearing impairment, bilaterally  -AW        Prior Level of Function-Communication expressive language impairment  -AW        Prior Level of Function-Swallowing no diet consistency restrictions;other (see comments)  h/o dysphagia with PEG after CVA 14 years ago; fully recovered per family  -AW        Plans/Goals Discussed with patient;agreed upon  -AW        Barriers to Rehab medically complex;previous functional deficit  aphasia  -AW        Patient's Goals for Discharge patient did not state  -AW           Pain    Pretreatment Pain Rating 0/10 - no pain  -AW        Posttreatment Pain Rating 0/10 - no pain  -AW           Oral Motor Structure and Function    Dentition Assessment natural, present and adequate  -AW        Secretion Management WNL/WFL  -AW        Mucosal Quality moist, healthy  -AW           Oral Musculature and Cranial Nerve Assessment    Oral Labial or Buccal Impairment, Detail, Cranial Nerve VII (Facial): right labial droop  -AW           General Eating/Swallowing Observations    Respiratory Support Currently in Use room air  -AW        Eating/Swallowing Skills fed by SLP;self-fed  -AW        Positioning During Eating upright in bed  -AW           MBS/VFSS    Utensils Used spoon;cup;straw  -AW        Consistencies Trialed thin liquids;nectar/syrup-thick liquids;honey-thick liquids;pureed;soft to chew textures;mixed consistency  -AW           MBS/VFSS Interpretation    VFSS Summary VFSS completed with Gracie BELLAMY. Pt exhibited severe oropharyngeal dysphagia characterized by decreased lingual and tongue base strength/coordination, reduced hyolaryngeal  excursion, reduced epiglottic deflection, and reduced pharyngeal constriction with an esophageal component. Pt took trials of thin, nectar, honey thick, pureed, soft, and mixed. Pt had trace silent penetration before and during the swallow inconsistently across all consistencies. With all trials, pt had moderate diffuse pharyngeal residuals (tongue base, valleculae, pyriforms) that caused additional  trace silent penetration/aspiration, increasing with fatigue.  Pt was unable to follow commands to dry swallow, clear throat, or cough, increasing the risk. Pt's mastication and bolus formation/trasfer was slow for soft solids with anterior spillage and oral residue noted which she expelled. Pt had a suspected developing diverticulum noted in the upper esophagus. Pt with noted slight stridor prior to the study that increased during the study.  -AW           SLP Communication to Radiology    Summary Statement VFSS completed with Gracie BELLAMY. Pt exhibited severe oropharyngeal dysphagia characterized by decreased lingual and tongue base strength/coordination, reduced hyolaryngeal excursion, reduced epiglottic deflection, and reduced pharyngeal constriction with an esophageal component. Pt took trials of thin, nectar, honey thick, pureed, soft, and mixed. Pt had trace silent penetration before and during the swallow inconsistently across all consistencies. With all trials, pt had moderate diffuse pharyngeal residuals (tongue base, valleculae, pyriforms) that caused additional trace silent penetration/aspiration, increasing with fatigue. Pt was unable to follow commands to dry swallow, clear throat, or cough, increasing the risk. Pt's mastication and bolus formation/trasfer was slow for soft solids with anterior spillage and oral residue noted which she expelled. Pt had a suspected developing diverticulum noted in the upper esophagus. Pt with noted slight stridor prior to the study that increased during the study.   -AW           SLP Evaluation Clinical Impression    SLP Swallowing Diagnosis severe;oral dysphagia;pharyngeal dysphagia  -AW        Functional Impact risk of aspiration/pneumonia;risk of malnutrition;risk of dehydration  -AW        Rehab Potential/Prognosis, Swallowing re-evaluate goals as necessary  -AW        Swallow Criteria for Skilled Therapeutic Interventions Met demonstrates skilled criteria  -AW           Recommendations    Therapy Frequency (Swallow) PRN  -AW        Predicted Duration Therapy Intervention (Days) until discharge  -AW        SLP Diet Recommendation NPO;ice chips between meals after oral care, with supervision;water between meals after oral care, with supervision;temporary alternate methods of nutrition/hydration;other (see comments)  question GO  -AW        Oral Care Recommendations Oral Care BID/PRN  -AW        SLP Rec. for Method of Medication Administration meds via alternate route  -AW        Monitor for Signs of Aspiration yes;notify SLP if any concerns  -AW        Anticipated Discharge Disposition (SLP) unknown  -AW                  User Key  (r) = Recorded By, (t) = Taken By, (c) = Cosigned By      Initials Name Effective Dates    Yakelin Torres SLP 08/28/23 -                     EDUCATION  The patient has been educated in the following areas:   Dysphagia (Swallowing Impairment) Oral Care/Hydration NPO rationale.                Time Calculation:    Time Calculation- SLP       Row Name 07/07/25 1622             Time Calculation- SLP    SLP Start Time 1145  -AW      SLP Received On 07/07/25  -                User Key  (r) = Recorded By, (t) = Taken By, (c) = Cosigned By      Initials Name Provider Type    Yakelin Torres SLP Speech and Language Pathologist                    Therapy Charges for Today       Code Description Service Date Service Provider Modifiers Qty    [id number] HC ST MOTION FLUORO EVAL SWALLOW 6 7/7/2025 Yakelin Teresa SLP GN 1                 Yakelin Teresa  SLP  7/7/2025

## 2025-07-07 NOTE — PLAN OF CARE
Goal Outcome Evaluation:         Pt resting in bed quietly. Denies pain. Refuses turns at times. Education done.

## 2025-07-07 NOTE — PROGRESS NOTES
"RENAL/KCC:     LOS: 6 days   Patient Care Team:  Bety Mccloud APRN as PCP - General (Nurse Practitioner)    Chief Complaint: FAMILIA, obstructive uropathy       History of Present Illness  Agueda Krishna is a 89 y.o. female with h/o ckd III followed by Dr. JAQUI Lang. Admitted with pyelonephritis, familia, and urinary obstruction.  S/p cystoscopy with stent placement.       Subjective  No acute events.   UOP adequate  Resting in bed denies complaints.  Good oral intake  No nausea vomiting shortness of breath or chest pain    History taken from: patient chart    Objective     Vital Sign Min/Max for last 24 hours  Temp  Min: 97.3 °F (36.3 °C)  Max: 98.1 °F (36.7 °C)   BP  Min: 124/97  Max: 177/59   Pulse  Min: 54  Max: 65   Resp  Min: 16  Max: 16   SpO2  Min: 98 %  Max: 99 %   No data recorded   No data recorded     Flowsheet Rows      Flowsheet Row First Filed Value   Admission Height 167.6 cm (66\") Documented at 07/01/2025 1234   Admission Weight 70.3 kg (155 lb) Documented at 07/01/2025 1234            No intake/output data recorded.  I/O last 3 completed shifts:  In: 1262.5 [P.O.:120; I.V.:1142.5]  Out: 1800 [Urine:1800]    Objective:  Vital signs: (most recent): Blood pressure 148/67, pulse 60, temperature 98.1 °F (36.7 °C), temperature source Oral, resp. rate 16, height 167.6 cm (66\"), weight 72.4 kg (159 lb 9.8 oz), SpO2 98%, not currently breastfeeding.            Physical Examination: General appearance -no acute distress, chronically ill-appearing  Chest - clear to auscultation, no wheezes, rales or rhonchi, symmetric air entry  Heart - normal rate, regular rhythm, normal S1, S2, no murmurs, rubs, clicks or gallops  Abdomen - soft, nontender, nondistended, no masses or organomegaly  Extremities - peripheral pulses normal, no pedal edema, no clubbing or cyanosis     Results Review:     I reviewed the patient's new clinical results.    WBC WBC   Date Value Ref Range Status   07/07/2025 5.97 3.40 - 10.80 10*3/mm3 Final " "  07/06/2025 6.72 3.40 - 10.80 10*3/mm3 Final   07/05/2025 9.78 3.40 - 10.80 10*3/mm3 Final      HGB Hemoglobin   Date Value Ref Range Status   07/07/2025 8.2 (L) 12.0 - 15.9 g/dL Final   07/06/2025 8.3 (L) 12.0 - 15.9 g/dL Final   07/05/2025 7.7 (L) 12.0 - 15.9 g/dL Final      HCT Hematocrit   Date Value Ref Range Status   07/07/2025 27.6 (L) 34.0 - 46.6 % Final   07/06/2025 26.4 (L) 34.0 - 46.6 % Final   07/05/2025 25.9 (L) 34.0 - 46.6 % Final      Platlets No results found for: \"LABPLAT\"   MCV MCV   Date Value Ref Range Status   07/07/2025 90.2 79.0 - 97.0 fL Final   07/06/2025 87.4 79.0 - 97.0 fL Final   07/05/2025 90.9 79.0 - 97.0 fL Final          Sodium Sodium   Date Value Ref Range Status   07/07/2025 145 136 - 145 mmol/L Final   07/06/2025 146 (H) 136 - 145 mmol/L Final   07/05/2025 142 136 - 145 mmol/L Final      Potassium Potassium   Date Value Ref Range Status   07/07/2025 3.5 3.5 - 5.2 mmol/L Final   07/06/2025 3.8 3.5 - 5.2 mmol/L Final   07/05/2025 3.5 3.5 - 5.2 mmol/L Final      Chloride Chloride   Date Value Ref Range Status   07/07/2025 114 (H) 98 - 107 mmol/L Final   07/06/2025 114 (H) 98 - 107 mmol/L Final   07/05/2025 111 (H) 98 - 107 mmol/L Final      CO2 CO2   Date Value Ref Range Status   07/07/2025 23.5 22.0 - 29.0 mmol/L Final   07/06/2025 26.0 22.0 - 29.0 mmol/L Final   07/05/2025 23.4 22.0 - 29.0 mmol/L Final      BUN BUN   Date Value Ref Range Status   07/07/2025 21.0 8.0 - 23.0 mg/dL Final   07/06/2025 28.0 (H) 8.0 - 23.0 mg/dL Final   07/05/2025 40.0 (H) 8.0 - 23.0 mg/dL Final      Creatinine Creatinine   Date Value Ref Range Status   07/07/2025 1.42 (H) 0.57 - 1.00 mg/dL Final   07/06/2025 1.81 (H) 0.57 - 1.00 mg/dL Final   07/05/2025 2.06 (H) 0.57 - 1.00 mg/dL Final      Calcium Calcium   Date Value Ref Range Status   07/07/2025 7.6 (L) 8.2 - 9.6 mg/dL Final   07/06/2025 7.5 (L) 8.2 - 9.6 mg/dL Final   07/05/2025 7.3 (L) 8.2 - 9.6 mg/dL Final      PO4 No results found for: \"CAPO4\" " "  Albumin Albumin   Date Value Ref Range Status   07/05/2025 2.1 (L) 3.5 - 5.2 g/dL Final      Magnesium No results found for: \"MG\"     Uric Acid No results found for: \"URICACID\"     Medication Review:     Current Facility-Administered Medications:     acetaminophen (TYLENOL) tablet 650 mg, 650 mg, Oral, Q4H PRN **OR** acetaminophen (TYLENOL) 160 MG/5ML oral solution 650 mg, 650 mg, Oral, Q4H PRN **OR** acetaminophen (TYLENOL) suppository 650 mg, 650 mg, Rectal, Q4H PRN, Joel Beebe MD    carbidopa-levodopa (SINEMET)  MG per tablet 1 tablet, 1 tablet, Oral, BID, Joel Beebe MD, 1 tablet at 07/07/25 0734    ceFAZolin 2000 mg IVPB in 100 mL NS (MBP), 2,000 mg, Intravenous, Q12H, Emeka Broussard MD, Currently Infusing at 07/07/25 0737    cholecalciferol (VITAMIN D3) tablet 5,000 Units, 5,000 Units, Oral, Daily, Jeol Beebe MD, 5,000 Units at 07/07/25 0734    folic acid (FOLVITE) tablet 1 mg, 1 mg, Oral, Daily, Alexys Castano MD PhD, 1 mg at 07/07/25 0734    levothyroxine (SYNTHROID, LEVOTHROID) tablet 75 mcg, 75 mcg, Oral, Q AM, Joel Beebe MD, 75 mcg at 07/07/25 0613    metoprolol tartrate (LOPRESSOR) tablet 12.5 mg, 12.5 mg, Oral, BID, David Sandoval MD, 12.5 mg at 07/07/25 0734    nitroglycerin (NITROSTAT) SL tablet 0.4 mg, 0.4 mg, Sublingual, Q5 Min PRN, Joel Beebe MD    ondansetron ODT (ZOFRAN-ODT) disintegrating tablet 4 mg, 4 mg, Oral, Q6H PRN **OR** ondansetron (ZOFRAN) injection 4 mg, 4 mg, Intravenous, Q6H PRN, Joel Beebe, MD    [COMPLETED] Insert Peripheral IV, , , Once **AND** sodium chloride 0.9 % flush 10 mL, 10 mL, Intravenous, PRN, Joel Beebe MD    sodium chloride 0.9 % flush 10 mL, 10 mL, Intravenous, Q12H, Joel Beebe MD, 10 mL at 07/07/25 0737    sodium chloride 0.9 % flush 10 mL, 10 mL, Intravenous, PRN, Joel Beebe MD    sodium chloride 0.9 % infusion 40 mL, 40 mL, Intravenous, PRN, Joel Beebe, " MD      Assessment & Plan       E. coli UTI (urinary tract infection)    Hypovolemic shock    Acute kidney injury    Left ureteral calculus    Severe sepsis with thrombocytopenia    Folate deficiency    Splenomegaly, congestive, chronic    Cirrhosis of liver with ascites    Iron deficiency anemia      Assessment & Plan  FAMILIA, improving, still not quite back to baseline  CKD, stage IIIa (baseline around 1.2)  Obstructive uropathy s/p stent placement.   Thrombocytopenia   Metabolic acidosis   Hypernatremia, Stable    Plan:  Renal function continues to slowly improve, creatinine 1.42 today is near her prior baseline of around 1.2  Euvolemic on exam  Continue electrolyte replacement per protocol, as needed  Discontinue IV fluids and encourage oral fluid intake  UOP improved as well as metabolic acidosis   Will follow.  Discussed with family at bedside        Bar Lang MD  [facility]  07/07/25  14:09 EDT

## 2025-07-08 LAB
ALBUMIN SERPL-MCNC: 2.1 G/DL (ref 3.5–5.2)
ANION GAP SERPL CALCULATED.3IONS-SCNC: 10 MMOL/L (ref 5–15)
BUN SERPL-MCNC: 15 MG/DL (ref 8–23)
BUN/CREAT SERPL: 11.6 (ref 7–25)
CALCIUM SPEC-SCNC: 7.3 MG/DL (ref 8.2–9.6)
CHLORIDE SERPL-SCNC: 112 MMOL/L (ref 98–107)
CO2 SERPL-SCNC: 22 MMOL/L (ref 22–29)
CREAT SERPL-MCNC: 1.29 MG/DL (ref 0.57–1)
DEPRECATED RDW RBC AUTO: 45 FL (ref 37–54)
EGFRCR SERPLBLD CKD-EPI 2021: 37.4 ML/MIN/1.73
ERYTHROCYTE [DISTWIDTH] IN BLOOD BY AUTOMATED COUNT: 14.2 % (ref 12.3–15.4)
FIBRINOGEN PPP-MCNC: 427 MG/DL (ref 219–464)
GLUCOSE SERPL-MCNC: 110 MG/DL (ref 65–99)
HCT VFR BLD AUTO: 25.9 % (ref 34–46.6)
HGB BLD-MCNC: 8.1 G/DL (ref 12–15.9)
MCH RBC QN AUTO: 27.3 PG (ref 26.6–33)
MCHC RBC AUTO-ENTMCNC: 31.3 G/DL (ref 31.5–35.7)
MCV RBC AUTO: 87.2 FL (ref 79–97)
PHOSPHATE SERPL-MCNC: 2.3 MG/DL (ref 2.5–4.5)
PLATELET # BLD AUTO: 33 10*3/MM3 (ref 140–450)
PLATELET # BLD AUTO: 33 10*3/MM3 (ref 140–450)
PLATELETS.RETICULATED NFR BLD AUTO: 7.8 % (ref 0.9–6.5)
POTASSIUM SERPL-SCNC: 3.6 MMOL/L (ref 3.5–5.2)
RBC # BLD AUTO: 2.97 10*6/MM3 (ref 3.77–5.28)
SODIUM SERPL-SCNC: 144 MMOL/L (ref 136–145)
WBC NRBC COR # BLD AUTO: 5.41 10*3/MM3 (ref 3.4–10.8)

## 2025-07-08 PROCEDURE — 85027 COMPLETE CBC AUTOMATED: CPT | Performed by: INTERNAL MEDICINE

## 2025-07-08 PROCEDURE — 85384 FIBRINOGEN ACTIVITY: CPT | Performed by: INTERNAL MEDICINE

## 2025-07-08 PROCEDURE — 97110 THERAPEUTIC EXERCISES: CPT | Performed by: PHYSICAL THERAPIST

## 2025-07-08 PROCEDURE — 97116 GAIT TRAINING THERAPY: CPT | Performed by: PHYSICAL THERAPIST

## 2025-07-08 PROCEDURE — 99232 SBSQ HOSP IP/OBS MODERATE 35: CPT | Performed by: INTERNAL MEDICINE

## 2025-07-08 PROCEDURE — G0545 PR INHERENT VISIT TO INPT: HCPCS | Performed by: INTERNAL MEDICINE

## 2025-07-08 PROCEDURE — 25010000002 CEFAZOLIN PER 500 MG: Performed by: INTERNAL MEDICINE

## 2025-07-08 PROCEDURE — 85055 RETICULATED PLATELET ASSAY: CPT | Performed by: INTERNAL MEDICINE

## 2025-07-08 PROCEDURE — 80069 RENAL FUNCTION PANEL: CPT | Performed by: INTERNAL MEDICINE

## 2025-07-08 RX ORDER — FERROUS SULFATE 325(65) MG
325 TABLET ORAL EVERY OTHER DAY
Status: DISCONTINUED | OUTPATIENT
Start: 2025-07-09 | End: 2025-07-10 | Stop reason: HOSPADM

## 2025-07-08 RX ORDER — DEXTROSE MONOHYDRATE, SODIUM CHLORIDE, AND POTASSIUM CHLORIDE 50; 1.49; 4.5 G/1000ML; G/1000ML; G/1000ML
75 INJECTION, SOLUTION INTRAVENOUS CONTINUOUS
Status: DISCONTINUED | OUTPATIENT
Start: 2025-07-08 | End: 2025-07-08

## 2025-07-08 RX ADMIN — FOLIC ACID 1 MG: 1 TABLET ORAL at 07:27

## 2025-07-08 RX ADMIN — LEVOTHYROXINE SODIUM 75 MCG: 0.07 TABLET ORAL at 06:42

## 2025-07-08 RX ADMIN — Medication 5000 UNITS: at 07:27

## 2025-07-08 RX ADMIN — Medication 10 ML: at 07:29

## 2025-07-08 RX ADMIN — METOPROLOL TARTRATE 12.5 MG: 25 TABLET, FILM COATED ORAL at 07:27

## 2025-07-08 RX ADMIN — CARBIDOPA AND LEVODOPA 1 TABLET: 25; 100 TABLET ORAL at 21:44

## 2025-07-08 RX ADMIN — Medication 10 ML: at 21:44

## 2025-07-08 RX ADMIN — POTASSIUM CHLORIDE, DEXTROSE MONOHYDRATE AND SODIUM CHLORIDE 75 ML/HR: 150; 5; 450 INJECTION, SOLUTION INTRAVENOUS at 11:56

## 2025-07-08 RX ADMIN — CARBIDOPA AND LEVODOPA 1 TABLET: 25; 100 TABLET ORAL at 07:27

## 2025-07-08 RX ADMIN — CEFAZOLIN 2000 MG: 2 INJECTION, POWDER, FOR SOLUTION INTRAMUSCULAR; INTRAVENOUS at 21:43

## 2025-07-08 RX ADMIN — ZINC OXIDE 1 APPLICATION: 200 OINTMENT TOPICAL at 21:44

## 2025-07-08 RX ADMIN — ZINC OXIDE 1 APPLICATION: 200 OINTMENT TOPICAL at 07:28

## 2025-07-08 RX ADMIN — ACETAMINOPHEN 650 MG: 325 TABLET ORAL at 17:05

## 2025-07-08 RX ADMIN — METOPROLOL TARTRATE 12.5 MG: 25 TABLET, FILM COATED ORAL at 21:43

## 2025-07-08 NOTE — PLAN OF CARE
Goal Outcome Evaluation:      Patient resting in bed.Denies pain.Medicated per MAR, meds crushed in sauce.Continues w IVFs and IV ABX given.[facility]ar[place] care.Plan of care ongoing

## 2025-07-08 NOTE — PROGRESS NOTES
ID note for bacteremia  S: No pain. AF. [facility] note reviewed and planning dc to snu    Physical Exam:   Vital Signs   Temp:  [97.7 °F (36.5 °C)-98.5 °F (36.9 °C)] 98.5 °F (36.9 °C)  Heart Rate:  [56-67] 63  Resp:  [16-18] 18  BP: (134-156)/(58-60) 134/58    GENERAL: Awake and alert, in no acute distress.   HEENT: Oropharynx is clear. Hearing is grossly [place].   EYES: . No conjunctival injection. No lid lag.   LUNGS:normal respiratory effort.   SKIN: no cutaneous eruptions in exposed areas       Results Review:  Plt 33  Fibringonen 427  WBC 5.41  Blood culture 2/2 E. coli      A/p  E. coli bacteremia secondary to pyelonephritis and obstructing nephrolithiasis status post stent placement  Acute kidney injury on chronic kidney disease stage 3, antibiotics dosed appropriately  Thrombocytopenia secondary to sepsis    Cont renally dosed cefazolin 2g IV q12 thru 7/8  If dc beforehand can can change to po cephalexin  Probably stop abx tomorrow if still doing well  D/w son this AM                                                          The above decisions regarding antimicrobials incorporates elements of engaging in complex medical decision-making associated with antimicrobial prescribing including considerations like antimicrobial resistance patterns, interactions/complications from comorbidities including concurrent infections, public health considerations to minimize development of antimicrobial resistance, recent antibiotic exposure,

## 2025-07-08 NOTE — PROGRESS NOTES
[facility] INPATIENT PROGRESS NOTE    Length of Stay:  7 days    CHIEF COMPLAINT:  Septic shock with pyelonephritis, E. coli bacteremia, obstructive nephrolithiasis, FAMILIA/CKD3a, acute on chronic thrombocytopenia, anemia, cirrhosis, Parkinson's disease, history of stroke    SUBJECTIVE:   Patient awake, alert seated in chair, attempting to communicate.  Patient is being seen simultaneously by palliative care.    ROS:  Review of Systems   Unable to obtain due to mental status    OBJECTIVE:  Vitals:    07/07/25 1922 07/07/25 2308 07/08/25 0728 07/08/25 0730   BP: 154/60 156/58  134/58   BP Location: Left arm Right arm  Right arm   Patient Position: Lying Lying  Lying   Pulse: 65 56 67 63   Resp: 16 16 16 18   Temp: 97.9 °F (36.6 °C) 97.7 °F (36.5 °C)  98.5 °F (36.9 °C)   TempSrc: Oral Oral  Oral   SpO2: 99% 98%  98%   Weight:       Height:             PHYSICAL EXAMINATION:  General: Awake and alert, no distress  Chest/Lungs: Clear to auscultation bilaterally anteriorly  Heart: Regular rate and rhythm  Abdomen/GI: Soft nontender nondistended bowel sounds present  Extremities: Trace-1+ bilateral lower extremity edema.  Scattered ecchymoses in the forearms bilaterally        DIAGNOSTIC DATA:  Results Review:     I reviewed the patient's new clinical results.    Results from last 7 days   Lab Units 07/07/25  0700 07/06/25  0618 07/05/25  0441   WBC 10*3/mm3 5.97 6.72 9.78   HEMOGLOBIN g/dL 8.2* 8.3* 7.7*   HEMATOCRIT % 27.6* 26.4* 25.9*   PLATELETS 10*3/mm3 30* 31* 36*      Results from last 7 days   Lab Units 07/07/25  0700 07/06/25  0618 07/05/25  0441 07/04/25  0717 07/03/25  0556 07/02/25  1143 07/01/25  1230   SODIUM mmol/L 145 146* 142   < > 144 141 141   POTASSIUM mmol/L 3.5 3.8 3.5   < > 4.3 5.1 4.9   CHLORIDE mmol/L 114* 114* 111*   < > 113* 109* 111*   CO2 mmol/L 23.5 26.0 23.4   < > 19.7* 21.0* 17.0*   BUN mg/dL 21.0 28.0* 40.0*   < > 56.0* 49.0* 42.0*   CREATININE mg/dL 1.42* 1.81* 2.06*   < > 3.13*  2.90* 2.88*   CALCIUM mg/dL 7.6* 7.5* 7.3*   < > 7.5* 7.8* 8.2   BILIRUBIN mg/dL  --   --   --   --  <0.2 0.5 0.6   ALK PHOS U/L  --   --   --   --  86 104 113   ALT (SGPT) U/L  --   --   --   --  14 24 44*   AST (SGOT) U/L  --   --   --   --  31 60* 99*   GLUCOSE mg/dL 117* 96 84   < > 129* 207* 60*    < > = values in this interval not displayed.      Lab Results   Component Value Date    NEUTROABS 11.18 (H) 07/04/2025     Results from last 7 days   Lab Units 07/06/25  0618 07/01/25  1324   INR  1.20* 1.72*   APTT seconds  --  35.5*     Results from last 7 days   Lab Units 07/02/25  1143   MAGNESIUM mg/dL 1.7           Assessment & Plan   ASSESSMENT/PLAN:  This is a 89 y.o. female with:     *Septic shock with pyelonephritis, E. coli bacteremia, obstructive nephrolithiasis  CT abdomen pelvis 7/1/2025 with 2.1 cm stone left renal pelvis with left pelviectasis and perinephric fat stranding suggesting obstruction and UTI/pyelonephritis, bladder wall thickening and perivesical fat stranding possibly secondary to cystitis.  Blood cultures 7/1/2025 positive for E. Coli  Patient currently continuing on cefazolin  Infectious disease following    *FAMILIA/CKD3a  Baseline creatinine around 1.2  On admission creatinine 2.88 with increased to 3.13 on 7/3/2025  Obstructive uropathy status post left ureteral stent placement on 7/1/2025  Creatinine today improved at 1.29    *Acute on chronic thrombocytopenia  Patient with chronic thrombocytopenia dating back to at least 2015 with platelet count in the low to mid 100,000 range  Evidence of underlying cirrhosis on imaging studies which may be contributing factor to chronic thrombocytopenia  Platelet count prior to admission on 6/4/25 was 84,000  Platelet count on admission is 7/1/2025 was 36,000, decline to 22,000 following admission.  IPF elevated at 13.5%  Additional labs on 7/2/2025 with B12 842, folate low at 4.24, LDH borderline elevated at 231, IPF elevated at 15.1%  Acute on  chronic thrombocytopenia felt to likely be consumptive in nature related to current infection/sepsis  Platelet count today stable at 33,000.  IPF elevated at 7.8%.    *Anemia  Patient with history of anemia, labs prior to admission on 6/9/2025 with hemoglobin 10.5, MCV 88.6, iron 18, ferritin 40, iron saturation 5%, TIBC 369 consistent with iron deficiency anemia  On admission 7/1/2025 hemoglobin was 9.2  Additional labs on 7/1/2025 with iron 14, ferritin 154, iron saturation 4%, TIBC 373, B12 842, folate low at 4.24  Stool for occult blood positive on 7/2/2025.  Patient is not a candidate for endoscopic evaluation  Patient is receiving oral folic acid 1 mg daily for folate deficiency  Hemoglobin did decline down to 7.5 on 7/4/25  With low iron saturation at 4%, some suspicion for concurrent iron deficiency, particularly in light of stool positive for occult blood.    Hemoglobin today stable at 8.1.  Begin oral iron sulfate every other day    *Cirrhosis  CT abdomen pelvis on 7/1/2025 with heterogeneous hepatic attenuation with subtle nodular liver contours possibly reflecting underlying hepatocellular disease/cirrhosis with evidence of portal venous hypertension including splenomegaly (14.5 cm), recanalization of periumbilical vein, trace ascites.  Viral hepatitis A, B, C panel negative on 7/2/2025  Possibly contributing factors of the patient's chronic mild thrombocytopenia    *Parkinson's disease  Carbidopa/levodopa 25/100 twice daily    *History of stroke    *Disposition  Patient's family has made decision not to pursue placement of feeding tube in relation to swallowing issues  Palliative care meeting with patient and family today to discuss goals of care      Plan:  No current need for platelet transfusion unless bleeding occurs  Patient continuing on cefazolin  Begin oral iron sulfate every other day  Daily CBC/differential    Discussed with patient's son at bedside           Ernesto Gilmore MD

## 2025-07-08 NOTE — PLAN OF CARE
Problem: Adult Inpatient Plan of Care  Goal: Plan of Care Review  Outcome: Progressing  Flowsheets (Taken 7/8/2025 1521)  Progress: improving  Outcome Evaluation: Patient has been cooperative during shift. No complaints of pain, nausea or SOA. AOx?, SR, room air, assist x1. IVFs stopped. Remove montes de oca catheter. Palliative consulted. Regular diet started, Dr. Erickson discussed the risks of continuing patient on a diet due to signs of aspiration on VFSS. Will continue to monitor and assist patient as needed.  Plan of Care Reviewed With: patient  Goal: Patient-Specific Goal (Individualized)  Outcome: Progressing  Goal: Absence of Hospital-Acquired Illness or Injury  Outcome: Progressing  Intervention: Identify and Manage Fall Risk  Recent Flowsheet Documentation  Taken 7/8/2025 1400 by Forest Rae RN  Safety Promotion/Fall Prevention:   assistive device/personal items within reach   fall prevention program maintained   nonskid shoes/slippers when out of bed   safety round/check completed  Taken 7/8/2025 1200 by Forest Rae RN  Safety Promotion/Fall Prevention:   assistive device/personal items within reach   fall prevention program maintained   nonskid shoes/slippers when out of bed   safety round/check completed  Taken 7/8/2025 1000 by Forest Rae RN  Safety Promotion/Fall Prevention:   assistive device/personal items within reach   fall prevention program maintained   nonskid shoes/slippers when out of bed   safety round/check completed  Intervention: Prevent Skin Injury  Recent Flowsheet Documentation  Taken 7/8/2025 1400 by Forest Rae RN  Body Position:   left   turned  Skin Protection: incontinence pads utilized  Taken 7/8/2025 1200 by Forest Rae RN  Body Position:   right   turned  Taken 7/8/2025 1000 by Forest Rae RN  Body Position:   left   turned  Taken 7/8/2025 0731 by Forest Rae RN  Body Position:   left   turned  Skin Protection:   incontinence pads utilized    transparent dressing maintained  Goal: Optimal Comfort and Wellbeing  Outcome: Progressing  Goal: Readiness for Transition of Care  Outcome: Progressing     Problem: Sepsis/Septic Shock  Goal: Optimal Coping  Outcome: Progressing  Goal: Absence of Bleeding  Outcome: Progressing  Goal: Blood Glucose Level Within Target Range  Outcome: Progressing  Goal: Absence of Infection Signs and Symptoms  Outcome: Progressing  Goal: Optimal Nutrition Delivery  Outcome: Progressing     Problem: Comorbidity Management  Goal: Blood Pressure in Desired Range  Outcome: Progressing     Problem: Fall Injury Risk  Goal: Absence of Fall and Fall-Related Injury  Outcome: Progressing  Intervention: Promote Injury-Free Environment  Recent Flowsheet Documentation  Taken 7/8/2025 1400 by Forest Rae RN  Safety Promotion/Fall Prevention:   assistive device/personal items within reach   fall prevention program maintained   nonskid shoes/slippers when out of bed   safety round/check completed  Taken 7/8/2025 1200 by Forest Rae RN  Safety Promotion/Fall Prevention:   assistive device/personal items within reach   fall prevention program maintained   nonskid shoes/slippers when out of bed   safety round/check completed  Taken 7/8/2025 1000 by Forest Rae RN  Safety Promotion/Fall Prevention:   assistive device/personal items within reach   fall prevention program maintained   nonskid shoes/slippers when out of bed   safety round/check completed     Problem: Skin Injury Risk Increased  Goal: Skin Health and Integrity  Outcome: Progressing  Intervention: Optimize Skin Protection  Recent Flowsheet Documentation  Taken 7/8/2025 1400 by Forest Rae RN  Pressure Reduction Techniques:   frequent weight shift encouraged   weight shift assistance provided  Head of Bed (HOB) Positioning: HOB at 30 degrees  Pressure Reduction Devices: pressure-redistributing mattress utilized  Skin Protection: incontinence pads utilized  Taken  7/8/2025 1200 by Forest Rae, RN  Head of Bed ([facility]) Positioning: HOB at 30 degrees  Taken 7/8/2025 1000 by Forest Rae RN  Head of Bed ([facility]) Positioning: HOB at 30 degrees  Taken 7/8/2025 0731 by Forest Rae, RN  Pressure Reduction Techniques:   frequent weight shift encouraged   weight shift assistance provided  Head of Bed ([facility]) Positioning: HOB at 45 degrees  Pressure Reduction Devices: pressure-redistributing mattress utilized  Skin Protection:   incontinence pads utilized   transparent dressing maintained     Problem: UTI (Urinary Tract Infection)  Goal: Improved Infection Symptoms  Outcome: Progressing   Goal Outcome Evaluation:  Plan of Care Reviewed With: patient        Progress: improving  Outcome Evaluation: Patient has been cooperative during shift. No complaints of pain, nausea or SOA. AOx?, SR, room air, assist x1. IVFs stopped. Remove montes de oca catheter. Palliative consulted. Regular diet started, Dr. Erickson discussed the risks of continuing patient on a diet due to signs of aspiration on VFSS. Will continue to monitor and assist patient as needed.

## 2025-07-08 NOTE — PROGRESS NOTES
Name: Agueda Krishna ADMIT: 2025   : 10/20/1925  PCP: Bety Mccloud APRN    MRN: [id number] LOS: 7 days   AGE/SEX: 89 y.o. female  ROOM: [facility]     Subjective   Subjective     Patient was examined at bedside.  A long conversation with the  patient's son.  We discussed the n.p.o. recommendation made by speech therapy and with the alternative options for nutrition were, including tube feeding which would require an NG tube initially followed by a PEG tube, or focusing more on comfort and allowing for oral intake.  Ultimately the patient's 2 sons opted for oral intake and acknowledged the risks of aspiration potentially leading to pneumonia, sepsis, and death.    The patient had some swelling in her left forearm today as well.      Objective   Objective   Vital Signs  Temp:  [97.7 °F (36.5 °C)-98.5 °F (36.9 °C)] 98.5 °F (36.9 °C)  Heart Rate:  [56-67] 63  Resp:  [16-18] 18  BP: (134-156)/(58-60) 134/58  SpO2:  [98 %-99 %] 98 %  on   ;   Device (Oxygen Therapy): room air  Body mass index is 25.76 kg/m².  Physical Exam  Constitutional:       General: She is not in acute distress.     Appearance: She is ill-appearing. She is not toxic-appearing.   Cardiovascular:      Rate and Rhythm: Normal rate and regular rhythm.      Heart sounds: Normal heart sounds.   Pulmonary:      Effort: Pulmonary effort is normal.      Breath sounds: Normal breath sounds.   Abdominal:      General: Bowel sounds are normal.      Palpations: Abdomen is soft.   Musculoskeletal:      Right lower leg: Edema present.      Left lower leg: Edema present.      Comments: Left forearm swelling   Skin:     Findings: Bruising present.   Neurological:      Mental Status: She is alert. Mental status is at baseline.      Comments: Facial droop  Aphasia   Vision and hearing impairments   Psychiatric:         Mood and Affect: Mood normal.         Behavior: Behavior normal.         Results Review     I reviewed the patient's new clinical  "results.  Results from last 7 days   Lab Units 07/08/25  0759 07/07/25 0700 07/06/25 0618 07/05/25 0441   WBC 10*3/mm3 5.41 5.97 6.72 9.78   HEMOGLOBIN g/dL 8.1* 8.2* 8.3* 7.7*   PLATELETS 10*3/mm3 33*  33* 30* 31* 36*     Results from last 7 days   Lab Units 07/08/25  0955 07/07/25 0700 07/06/25 0618 07/05/25 0441   SODIUM mmol/L 144 145 146* 142   POTASSIUM mmol/L 3.6 3.5 3.8 3.5   CHLORIDE mmol/L 112* 114* 114* 111*   CO2 mmol/L 22.0 23.5 26.0 23.4   BUN mg/dL 15.0 21.0 28.0* 40.0*   CREATININE mg/dL 1.29* 1.42* 1.81* 2.06*   GLUCOSE mg/dL 110* 117* 96 84   Estimated Creatinine Clearance: 24.2 mL/min (A) (by C-G formula based on SCr of 1.29 mg/dL (H)).  Results from last 7 days   Lab Units 07/08/25 0955 07/05/25 0441 07/03/25 0556 07/02/25  1143   ALBUMIN g/dL 2.1* 2.1* 2.4* 2.5*   BILIRUBIN mg/dL  --   --  <0.2 0.5   ALK PHOS U/L  --   --  86 104   AST (SGOT) U/L  --   --  31 60*   ALT (SGPT) U/L  --   --  14 24     Results from last 7 days   Lab Units 07/08/25  0955 07/07/25 0700 07/06/25 0618 07/05/25 0441 07/04/25  0717 07/03/25  0556 07/02/25  1143   CALCIUM mg/dL 7.3* 7.6* 7.5* 7.3*   < > 7.5* 7.8*   ALBUMIN g/dL 2.1*  --   --  2.1*  --  2.4* 2.5*   MAGNESIUM mg/dL  --   --   --   --   --   --  1.7   PHOSPHORUS mg/dL 2.3*  --   --  3.5  --   --  5.1*    < > = values in this interval not displayed.     Results from last 7 days   Lab Units 07/01/25  2345 07/01/25 2049 07/01/25  1516   LACTATE mmol/L 1.7 2.2* 5.5*   No results found for: \"COVID19\"  No results found for: \"HGBA1C\", \"POCGLU\"      FL Video Swallow Single Contrast  VIDEO SWALLOWING EXAMINATION BY SPEECH PATHOLOGY     Clinical: Dysphasia     Reference Air Kerma: 12 mGy     Video swallowing examination performed under the direction of speech  pathology. Imaging reviewed by radiologist who concurs with the  findings.     Speech pathology summary:     VFSS completed with Gracie BELLAMY. Pt exhibited severe  oropharyngeal dysphagia " characterized by decreased lingual and tongue  base strength/coordination, reduced hyolaryngeal excursion, reduced  epiglottic deflection, and reduced pharyngeal constriction with an  esophageal component. Pt took trials of thin, nectar, honey thick,  pureed, soft, and mixed. Pt had trace silent penetration before and  during the swallow inconsistently across all consistencies. With all  trials, pt had moderate diffuse pharyngeal residuals (tongue base,  valleculae, pyriforms) that caused additional trace silent  penetration/aspiration, increasing with fatigue. Pt was unable to follow  commands to dry swallow, clear throat, or cough, increasing the risk.  Pt's mastication and bolus formation/transfer was slow for soft solids  with anterior spillage and oral residue noted which she expelled. Pt had  a suspected developing diverticulum noted in the upper esophagus. Pt  with noted slight stridor prior to the study that increased during the  study.           This report was finalized on 7/8/2025 10:11 AM by Dr. Anjel Stringer M.D  on Workstation: [id number]       Scheduled Medications  carbidopa-levodopa, 1 tablet, Oral, BID  ceFAZolin, 2,000 mg, Intravenous, Q12H  cholecalciferol, 5,000 Units, Oral, Daily  folic acid, 1 mg, Oral, Daily  hydrocortisone-bacitracin-zinc oxide-nystatin, 1 Application, Topical, BID  levothyroxine, 75 mcg, Oral, Q AM  metoprolol tartrate, 12.5 mg, Oral, BID  sodium chloride, 10 mL, Intravenous, Q12H    Infusions  dextrose 5 % and sodium chloride 0.45 % with KCl 20 mEq/L, 75 mL/hr, Last Rate: 75 mL/hr (07/07/25 2025)  dextrose 5 % and sodium chloride 0.45 % with KCl 20 mEq/L, 75 mL/hr, Last Rate: 75 mL/hr (07/08/25 1156)    Diet  Diet: Regular/House; Fluid Consistency: Thin (IDDSI 0)       Assessment/Plan     Active Hospital Problems    Diagnosis  POA    **E. coli UTI (urinary tract infection) [N39.0, B96.20]  Yes    Severe sepsis with thrombocytopenia [A41.9, D69.59, R65.20]  Yes    Folate  deficiency [E53.8]  Yes    Splenomegaly, congestive, chronic [D73.2]  Yes    Cirrhosis of liver with ascites [K74.60, R18.8]  Yes    Iron deficiency anemia [D50.9]  Yes    Hypovolemic shock [R57.1]  Yes    Acute kidney injury [N17.9]  Yes    Left ureteral calculus [N20.1]  Yes      Resolved Hospital Problems   No resolved problems to display.       89 y.o. female admitted with E. coli UTI (urinary tract infection).    89 year old woman who presented with weakness, vomiting, and diarrhea and was found to have septic shock due to e coli bacteremia/pyelonephritis due to obstructive nephrolithiasis as well as an FAMILIA on CKD 3b    E coli bacteremia/pyelonephritis due to obstructive nephrolithiasis causing septic shock-s/p cystoscopy with retrograde pyelogram and left stent insertion on 7/1/25. On cefazolin per ID    FAMILIA on CKD 3b-creatinine is down to 1.8 today. On 1/2 NS per nephrology    Acute on chronic thrombocytopenia-chronic thrombocytopenia is likely due to cirrhosis/splenomegaly. Acute thrombocytopenia is probably related to her infection. Hematology is following. She has been started on supplemental folic acid    Transient lethargy/metabolic encephalopathy-head CT was negative acutely. EEG did show some incidental epileptiform discharges. Neurology doesn't think that there's sufficient evidence of a seizure disorder and so no AEDs are recommended. Resolved.    Iron deficiency anemia-hgb is 8.3. no iv iron in the setting of bacteremia. Transfuse for hgb <7    Hyperlipidemia-on a statin as an outpatient. Can be restarted at discharge    Cirrhosis-based on imaging with evidence of portal hypertension    Hypothyroidism-synthroid    Parkinson's disease-sinemet    History of CVA with chronic aphasia, partial blindness, hearing loss    Dysphagia-on a regular diet at home. Her diet was modified here earlier in the hospital stay when she was more acutely ill. SLP is planning a VFSS on Monday.     SCDs for DVT  prophylaxis.  Limited code (no CPR, no intubation).  Discussed with patient, family, and nursing staff.  Anticipate discharge to SNU facility timing yet to be determined.  Improving, but poor prognosis overall      Trace Erickson MD  [facility]  07/08/25  13:34 EDT

## 2025-07-08 NOTE — CONSULTS
[facility]   Inpatient Palliative Care Consultation  Patient Name: Agueda Krishna   Patient : 10/20/1925   Date: 2025   Reason for Consultation: assistance with clarification of goals of care  Inpatient Palliative Care Team Consult  Consult performed by: Abram Ornelas MD  Consult ordered by: Trace Erickson MD            Chief Complaint: Asked to see for goals of care     Information obtained from: relative(s) and past medical records. Aidan was present in the room an Epifanio was present on the phone along with DIL    Summary of Palliative Illness and Palliative Assessment: Ms. Krishna is a 89 year old woman with primary palliative care diagnosis of E.Coli bacteremia with pyelonephritis and obstructing kidney stone s/p stent placement. She had septic/hypovolemic shock and acute on chronic kidney failure now with improving creatinine. She has a history of stroke several years ago and actually had a PEG tube for artificial nutrition for sometime and remains aphasic.        Symptom Assessment: There is no current pain, shortness of breath or constipation     Discussion of Patient/Family Treatment Preferences and Goals of Care: There was a voluntary discussion of advance planning and goals of care discussion. The following were present for the visit: patient and son, Aidan, and son Epifanio    Summary of Discussion: Family well aware of role of palliative care. Prior to this admission Ms. Krishna was actually living alone and was making her own food although she had people checking in on her frequently. She did sustain a fall within the last few months but was able to get up on her own and didn't sustain any injuries. Son's report her appetite has diminished over the last 12 months but still eats ok and they definitely see this as a very acute event and not so much as another episode in a series of declining episodes. I asked what they were hoping for with regards to their mother's care. Her  "main goal is actually to reach the age of 89 which will happen in October of this year. They are realistic that this episode may cause the need for more care for her where she is unable to live on her own. They really would like to pursue rehab to see what she can do and if she can regain some functional ability. I told them I thought this was reasonable as she is very motivated. We did discuss hospice care as I believe with this episode and her significant other comorbid conditions and her advanced age I think she is hospice eligible. Discussed how based on their goals I would recommend rehab first to see how she does and what level of care she needs. They agreed. We also discussed code status and they affirmed DNAR and DNI. Discussed artificial nutrition if dysphagia worsens and they stated they would NOT pursue artificial nutrition via feeding tube and patient has also declined this.         Review of Systems: Review of Systems - Per HPI      Past Medical and Surgical History:    Past Medical History:   Diagnosis Date    History of stroke 03/31/2009    Hyperlipidemia     Hypertension     Hypothyroid       Past Surgical History:   Procedure Laterality Date    CATARACT EXTRACTION Bilateral     CYSTOSCOPY, RETROGRADE PYELOGRAM AND STENT INSERTION Left 7/1/2025    Procedure: CYSTOSCOPY, RETROGRADE PYELOGRAM, AND LEFT STENT INSERTION;  Surgeon: Joel Beebe MD;  Location: [facility];  Service: Urology;  Laterality: Left;    HYSTERECTOMY      REPLACEMENT TOTAL KNEE Left     TOTAL HIP ARTHROPLASTY Right           Palliative Care Psychosocial and Spiritual Screening    Previously was living at home along but had support from family several times per day. Was making her own meals. Appetite has been declniing       Physical Assessment:   /58 (BP Location: Right arm, Patient Position: Lying)   Pulse 63   Temp 98.5 °F (36.9 °C) (Oral)   Resp 18   Ht 167.6 cm (66\")   Wt 72.4 kg (159 lb 9.8 oz)   SpO2 98% "   BMI 25.76 kg/m²    Palliative Performance Scale: Performance 40% based on the following measures: Ambulation: Mainly in bed, Activity and Evidence of Disease: Unable to do any work, extensive evidence of disease, Self-Care: Mainly assistance required,  Intake: Normal or reduced, LOC: Full, drowsy or confusion  Physical Exam   Ms. Krishna is sitting in her chair, she is comfortable and doing some exercise with her arms  She is aphasic but can respond with head movements   Breathing comfortably in room air   Abdomen soft   +edema     Medications:    Current Facility-Administered Medications:     acetaminophen (TYLENOL) tablet 650 mg, 650 mg, Oral, Q4H PRN **OR** acetaminophen (TYLENOL) 160 MG/5ML oral solution 650 mg, 650 mg, Oral, Q4H PRN **OR** acetaminophen (TYLENOL) suppository 650 mg, 650 mg, Rectal, Q4H PRN, Joel Beebe MD    carbidopa-levodopa (SINEMET)  MG per tablet 1 tablet, 1 tablet, Oral, BID, Joel Beebe MD, 1 tablet at 07/08/25 0727    ceFAZolin 2000 mg IVPB in 100 mL NS (MBP), 2,000 mg, Intravenous, Q12H, Emeka Broussard MD, Currently Infusing at 07/08/25 0728    cholecalciferol (VITAMIN D3) tablet 5,000 Units, 5,000 Units, Oral, Daily, Joel Beebe MD, 5,000 Units at 07/08/25 0727    folic acid (FOLVITE) tablet 1 mg, 1 mg, Oral, Daily, Alexys Castano MD PhD, 1 mg at 07/08/25 0727    hydrocortisone-bacitracin-zinc oxide-nystatin (MAGIC BARRIER) ointment 1 Application, 1 Application, Topical, BID, Trace Erickson MD, 1 Application at 07/08/25 0728    levothyroxine (SYNTHROID, LEVOTHROID) tablet 75 mcg, 75 mcg, Oral, Q AM, Joel Beebe MD, 75 mcg at 07/08/25 0642    metoprolol tartrate (LOPRESSOR) tablet 12.5 mg, 12.5 mg, Oral, BID, David Sandoval MD, 12.5 mg at 07/08/25 0727    nitroglycerin (NITROSTAT) SL tablet 0.4 mg, 0.4 mg, Sublingual, Q5 Min PRN, Joel Beebe MD    ondansetron ODT (ZOFRAN-ODT) disintegrating tablet 4 mg, 4 mg, Oral, Q6H PRN  **OR** ondansetron (ZOFRAN) injection 4 mg, 4 mg, Intravenous, Q6H PRN, Joel Beebe MD    [COMPLETED] Insert Peripheral IV, , , Once **AND** sodium chloride 0.9 % flush 10 mL, 10 mL, Intravenous, PRN, Joel Beebe MD    sodium chloride 0.9 % flush 10 mL, 10 mL, Intravenous, Q12H, Joel Beebe MD, 10 mL at 07/08/25 0729    sodium chloride 0.9 % flush 10 mL, 10 mL, Intravenous, PRN, Joel Beebe MD    sodium chloride 0.9 % infusion 40 mL, 40 mL, Intravenous, PRN, Joel Beebe MD     Allergies:   Allergies   Allergen Reactions    Nitrofurantoin Hives    Sulfa Antibiotics Hives          Data (labs/images reviewed):   WBC   Date Value Ref Range Status   07/08/2025 5.41 3.40 - 10.80 10*3/mm3 Final     RBC   Date Value Ref Range Status   07/08/2025 2.97 (L) 3.77 - 5.28 10*6/mm3 Final     Hemoglobin   Date Value Ref Range Status   07/08/2025 8.1 (L) 12.0 - 15.9 g/dL Final     Hematocrit   Date Value Ref Range Status   07/08/2025 25.9 (L) 34.0 - 46.6 % Final     MCV   Date Value Ref Range Status   07/08/2025 87.2 79.0 - 97.0 fL Final     MCH   Date Value Ref Range Status   07/08/2025 27.3 26.6 - 33.0 pg Final     MCHC   Date Value Ref Range Status   07/08/2025 31.3 (L) 31.5 - 35.7 g/dL Final     RDW   Date Value Ref Range Status   07/08/2025 14.2 12.3 - 15.4 % Final     RDW-SD   Date Value Ref Range Status   07/08/2025 45.0 37.0 - 54.0 fl Final     Platelets   Date Value Ref Range Status   07/08/2025 33 (C) 140 - 450 10*3/mm3 Final   07/08/2025 33 (C) 140 - 450 10*3/mm3 Final        Lab Results   Component Value Date    GLUCOSE 110 (H) 07/08/2025    BUN 15.0 07/08/2025    CREATININE 1.29 (H) 07/08/2025     07/08/2025    K 3.6 07/08/2025     (H) 07/08/2025    CALCIUM 7.3 (L) 07/08/2025    PROTEINTOT 4.7 (L) 07/03/2025    ALBUMIN 2.1 (L) 07/08/2025    ALT 14 07/03/2025    AST 31 07/03/2025    ALKPHOS 86 07/03/2025    BILITOT <0.2 07/03/2025    GLOB 2.3 07/03/2025    AGRATIO 1.0 07/03/2025     BCR 11.6 07/08/2025    ANIONGAP 10.0 07/08/2025    EGFR 37.4 (L) 07/08/2025        FL Video Swallow Single Contrast  VIDEO SWALLOWING EXAMINATION BY SPEECH PATHOLOGY     Clinical: Dysphasia     Reference Air Kerma: 12 mGy     Video swallowing examination performed under the direction of speech  pathology. Imaging reviewed by radiologist who concurs with the  findings.     Speech pathology summary:     VFSS completed with Gracie BELLAMY. Pt exhibited severe  oropharyngeal dysphagia characterized by decreased lingual and tongue  base strength/coordination, reduced hyolaryngeal excursion, reduced  epiglottic deflection, and reduced pharyngeal constriction with an  esophageal component. Pt took trials of thin, nectar, honey thick,  pureed, soft, and mixed. Pt had trace silent penetration before and  during the swallow inconsistently across all consistencies. With all  trials, pt had moderate diffuse pharyngeal residuals (tongue base,  valleculae, pyriforms) that caused additional trace silent  penetration/aspiration, increasing with fatigue. Pt was unable to follow  commands to dry swallow, clear throat, or cough, increasing the risk.  Pt's mastication and bolus formation/transfer was slow for soft solids  with anterior spillage and oral residue noted which she expelled. Pt had  a suspected developing diverticulum noted in the upper esophagus. Pt  with noted slight stridor prior to the study that increased during the  study.           This report was finalized on 7/8/2025 10:11 AM by Dr. Anjel Stringer M.D  on Workstation: [id number]              Palliative Care Assessment and Recommendations: Agueda Krishna is a 89 y.o. female with a primary palliative care diagnosis of E.Coli bacteremia 2/2 to pyelonephritis from obstructing renal stone s/p stent. She also has history of severe stroke and aphasia and cirrhosis on imaging here acute on chronic renal failure although Cr is improving. Palliative care was consulted  for goals of care.     Prognosis and Palliative Performance Scale:  Performance 40% based on the following measures: Ambulation: Mainly in bed, Activity and Evidence of Disease: Unable to do any work, extensive evidence of disease, Self-Care: Mainly assistance required,  Intake: Normal or reduced, LOC: Full, drowsy or confusion  Disease State: Overall stable now and seems to be stabilizing from this episode     Goals of Care Treatment Preferences   Palliative Care Decision Making Capacity: questionable  Healthcare Surrogate: Per legal succession  What is Most important to patient/family at this time: Trying Rehab. They want to move towards this  Code Status DNR  Other Considerations regarding life sustaining treatments: No intubation, no feeding tube.   Advance Directives Present or Completed: Code Status clarification  Other Recommendations: I think with Ms. Krishna's advanced age and severe hospitalization for sepsis she is hospice eligible with a prognosis of 6 months or less should the disease run its natural course. I am going to place a hospice consult so that we can follow her for outpatient services. Obviously given their goals, I would recommend rehab first and then hospice can follow up after that   Follow up: Palliative Care nathaniel continue to follow along   Discussed plan with: patient, family, and Dr. Erickson and CCP        I spent from 340pm to 402pm in advance care planning discussing the above goals of care (total time 22 minutes)      Thank you for consulting palliative care. If you need to reach the provider on call for the palliative care team please call [phone]      Abram Ornelas MD  7/8/2025 16:07 EDT

## 2025-07-08 NOTE — THERAPY TREATMENT NOTE
Patient Name: Agueda Krishna  : 10/20/1925    MRN: [id number]                              Today's Date: 2025       Admit Date: 2025    Visit Dx:     ICD-10-CM ICD-9-CM   1. Left ureteral calculus  N20.1 592.1   2. Hypovolemic shock  R57.1 785.59   3. Vomiting and diarrhea  R11.10 787.03    R19.7 787.91   4. Anemia, unspecified type  D64.9 285.9   5. Thrombocytopenia  D69.6 287.5   6. Leukocytosis, unspecified type  D72.829 288.60   7. Lactic acidosis  E87.20 276.2   8. Acute kidney injury  N17.9 584.9     Patient Active Problem List   Diagnosis    Hypovolemic shock    Acute kidney injury    Left ureteral calculus    Severe sepsis with thrombocytopenia    Folate deficiency    Splenomegaly, congestive, chronic    Cirrhosis of liver with ascites    Iron deficiency anemia    E. coli UTI (urinary tract infection)     Past Medical History:   Diagnosis Date    History of stroke 2009    Hyperlipidemia     Hypertension     Hypothyroid      Past Surgical History:   Procedure Laterality Date    CATARACT EXTRACTION Bilateral     CYSTOSCOPY, RETROGRADE PYELOGRAM AND STENT INSERTION Left 2025    Procedure: CYSTOSCOPY, RETROGRADE PYELOGRAM, AND LEFT STENT INSERTION;  Surgeon: Joel Beebe MD;  Location: [facility];  Service: Urology;  Laterality: Left;    HYSTERECTOMY      REPLACEMENT TOTAL KNEE Left     TOTAL HIP ARTHROPLASTY Right       General Information       Row Name 25 1545          Physical Therapy Time and Intention    Document Type therapy note (daily note)  -FRANK     Mode of Treatment individual therapy;physical therapy  -FRANK               User Key  (r) = Recorded By, (t) = Taken By, (c) = Cosigned By      Initials Name Provider Type    Lara Caceres, PT Physical Therapist                   Mobility       Row Name 25 1545          Bed Mobility    Supine-Sit [place] (Bed Mobility) moderate assist (50% patient effort)  -FRANK     Assistive Device (Bed Mobility) bed  rails;head of bed elevated  -KH       Row Name 07/08/25 1545          Sit-Stand Transfer    Sit-Stand [place] (Transfers) minimum assist (75% patient effort);moderate assist (50% patient effort)  -     Assistive Device (Sit-Stand Transfers) walker, front-wheeled  -KH       Row Name 07/08/25 1545          Gait/Stairs (Locomotion)    [place] Level (Gait) minimum assist (75% patient effort)  -     Assistive Device (Gait) walker, front-wheeled  -     Distance in Feet (Gait) 30  -     Deviations/Abnormal Patterns (Gait) gait speed decreased;stride length decreased  -     Bilateral Gait Deviations forward flexed posture;heel strike decreased  -     Comment, (Gait/Stairs) unsteady, 2 slight losses of balance  -               User Key  (r) = Recorded By, (t) = Taken By, (c) = Cosigned By      Initials Name Provider Type    Lara Caceres, PT Physical Therapist                   Obj/Interventions       Row Name 07/08/25 1546          Motor Skills    Therapeutic Exercise --  BLE AP, LAQ, seated marching, hip abd/add x 10 reps  -KH       Row Name 07/08/25 1546          Balance    Static Standing Balance minimal assist  -     Dynamic Standing Balance minimal assist  -     Position/Device Used, Standing Balance walker, rolling  -               User Key  (r) = Recorded By, (t) = Taken By, (c) = Cosigned By      Initials Name Provider Type    Lara Caceres, PT Physical Therapist                   Goals/Plan    No documentation.                  Clinical Impression       Row Name 07/08/25 1547          Pain    Pretreatment Pain Rating 0/10 - no pain  -     Posttreatment Pain Rating 0/10 - no pain  -KH       Row Name 07/08/25 1547          Plan of Care Review    Plan of Care Reviewed With patient;child  -     Outcome Evaluation Pt was in the bed and agreeable to therapy when PT arrived. Son was present and encouraging. Pt required increased assistance to tranition to sitting  EOB. She stood with min A and Rwx. Pt ambulated with min A and Rwx x 30 ft. Pt fatigued quickly with decreased step length and 2 slight LOB noted. Pt tolerated BLE exercises once seated in recliner. Pt required increased assistance and seemed more unsteady even with Rwx this session. Encouraged increased mobility with nursing staff including up to chair throughout the day. Awaiting SNF placement.  -FRANK       Row Name 07/08/25 1547          Positioning and Restraints    Pre-Treatment Position in bed  -KH     Post Treatment Position chair  -KH     In Chair reclined;call light within reach;encouraged to call for assist;exit alarm on;with family/caregiver;notified ns  -               User Key  (r) = Recorded By, (t) = Taken By, (c) = Cosigned By      Initials Name Provider Type    Lara Caceres, PT Physical Therapist                   Outcome Measures       Row Name 07/08/25 1554 07/08/25 0731       How much help from another person do you currently need...    Turning from your back to your side while in flat bed without using bedrails? 3  -KH 3  -MS    Moving from lying on back to sitting on the side of a flat bed without bedrails? 2  -KH 3  -MS    Moving to and from a bed to a chair (including a wheelchair)? 3  -KH 3  -MS    Standing up from a chair using your arms (e.g., wheelchair, bedside chair)? 2  -KH 3  -MS    Climbing 3-5 steps with a railing? 1  -KH 1  -MS    To walk in hospital room? 3  -KH 3  -MS    AM-PAC 6 Clicks Score (PT) 14  -KH 16  -MS    Highest Level of Mobility Goal Move to Chair/Commode-4  -KH Stand (1 or More Minutes)-5  -MS      Row Name 07/08/25 1554          Functional Assessment    Outcome Measure Options AM-PAC 6 Clicks Basic Mobility (PT)  -FRANK               User Key  (r) = Recorded By, (t) = Taken By, (c) = Cosigned By      Initials Name Provider Type    Lara Caceres, PT Physical Therapist    Forest Mireles, RN Registered Nurse                                  Physical Therapy Education       Title: PT OT SLP Therapies (In Progress)       Topic: Physical Therapy (Done)       Point: Mobility training (Done)       Learning Progress Summary            Patient Acceptance, E,D, DU by FRANCOIS at 7/6/2025 1727   Family Acceptance, E,D, DU by FRANCOIS at 7/6/2025 1727                      Point: Home exercise program (Done)       Learning Progress Summary            Patient Acceptance, E,D, DU by FRANCOIS at 7/6/2025 1727   Family Acceptance, E,D, DU by FRANCOIS at 7/6/2025 1727                      Point: Body mechanics (Done)       Learning Progress Summary            Patient Acceptance, E,D, DU by FRANCOIS at 7/6/2025 1727   Family Acceptance, E,D, DU by FRANCOIS at 7/6/2025 1727                      Point: Precautions (Done)       Learning Progress Summary            Patient Acceptance, E,D, DU by FRANCOIS at 7/6/2025 1727   Family Acceptance, E,D, DU by FRANCOIS at 7/6/2025 1727                                      User Key       Initials Effective Dates Name Provider Type Discipline    FRANCOIS 03/07/18 -  Phyllis Mccloud PTA Physical Therapist Assistant PT                  PT Recommendation and Plan  Planned Therapy Interventions (PT): balance training, bed mobility training, home exercise program, gait training, patient/family education, transfer training, strengthening  Outcome Evaluation: Pt was in the bed and agreeable to therapy when PT arrived. Son was present and encouraging. Pt required increased assistance to tranition to sitting EOB. She stood with min A and Rwx. Pt ambulated with min A and Rwx x 30 ft. Pt fatigued quickly with decreased step length and 2 slight LOB noted. Pt tolerated BLE exercises once seated in recliner. Pt required increased assistance and seemed more unsteady even with Rwx this session. Encouraged increased mobility with nursing staff including up to chair throughout the day. Awaiting SNF placement.     Time Calculation:         PT Charges       Row Name 07/08/25 5997             Time  Calculation    Start Time 1435  -KH      Stop Time 1458  -KH      Time Calculation (min) 23 min  -KH      PT Received On 07/08/25  -KH      PT - Next Appointment 07/09/25  -KH         Time Calculation- PT    Total Timed Code Minutes- PT 23 minute(s)  -KH         Timed Charges    68050 - PT Therapeutic Exercise Minutes 12  -KH      57598 - Gait Training Minutes  11  -KH         Total Minutes    Timed Charges Total Minutes 23  -KH       Total Minutes 23  -KH                User Key  (r) = Recorded By, (t) = Taken By, (c) = Cosigned By      Initials Name Provider Type    Lara Caceres, PT Physical Therapist                  Therapy Charges for Today       Code Description Service Date Service Provider Modifiers Qty    39880444039 HC PT THER PROC EA 15 MIN 7/8/2025 Lara Busby, PT GP 1    43323194046 HC GAIT TRAINING EA 15 MIN 7/8/2025 Lara Busby, PT GP 1            PT G-Codes  Outcome Measure Options: AM-PAC 6 Clicks Basic Mobility (PT)  AM-PAC 6 Clicks Score (PT): 14  AM-PAC 6 Clicks Score (OT): 13  Modified [place] Scale: 4 - Moderately severe disability.  Unable to walk without assistance, and unable to attend to own bodily needs without assistance.  PT Discharge Summary  Anticipated Discharge Disposition (PT): skilled nursing facility    Lara Busby, PT  7/8/2025

## 2025-07-08 NOTE — PLAN OF CARE
Goal Outcome Evaluation:  Plan of Care Reviewed With: patient, child           Outcome Evaluation: Pt was in the bed and agreeable to therapy when PT arrived. Son was present and encouraging. Pt required increased assistance to tranition to sitting EOB. She stood with min A and Rwx. Pt ambulated with min A and Rwx x 30 ft. Pt fatigued quickly with decreased step length and 2 slight LOB noted. Pt tolerated BLE exercises once seated in recliner. Pt required increased assistance and seemed more unsteady even with Rwx this session. Encouraged increased mobility with nursing staff including up to chair throughout the day. Awaiting SNF placement.    Anticipated Discharge Disposition (PT): skilled nursing facility

## 2025-07-08 NOTE — PROGRESS NOTES
"RENAL/KCC:     LOS: 7 days   Patient Care Team:  Bety Mccloud APRN as PCP - General (Nurse Practitioner)    Chief Complaint: FAMILIA, obstructive uropathy       History of Present Illness  Agueda Krishna is a 89 y.o. female with h/o ckd III followed by Dr. JAQUI Lang. Admitted with pyelonephritis, familia, and urinary obstruction.  S/p cystoscopy with stent placement.       Subjective  7/7: No acute events.   UOP adequate  Resting in bed denies complaints.  Good oral intake  No nausea vomiting shortness of breath or chest pain    7/8: No complaints other than worsening edema bilateral upper and lower extremities  Diet restarted, no dyspnea    History taken from: patient chart    Objective     Vital Sign Min/Max for last 24 hours  Temp  Min: 97.7 °F (36.5 °C)  Max: 98.5 °F (36.9 °C)   BP  Min: 134/58  Max: 156/58   Pulse  Min: 56  Max: 67   Resp  Min: 16  Max: 18   SpO2  Min: 98 %  Max: 99 %   No data recorded   No data recorded     Flowsheet Rows      Flowsheet Row First Filed Value   Admission Height 167.6 cm (66\") Documented at 07/01/2025 1234   Admission Weight 70.3 kg (155 lb) Documented at 07/01/2025 1234            I/O this shift:  In: -   Out: 200 [Urine:200]  I/O last 3 completed shifts:  In: 120 [P.O.:120]  Out: 1500 [Urine:1500]    Objective:  Vital signs: (most recent): Blood pressure 134/58, pulse 63, temperature 98.5 °F (36.9 °C), temperature source Oral, resp. rate 18, height 167.6 cm (66\"), weight 72.4 kg (159 lb 9.8 oz), SpO2 98%, not currently breastfeeding.            Physical Examination: General appearance -no acute distress, chronically ill-appearing  Chest - clear to auscultation, no wheezes, rales or rhonchi, symmetric air entry  Heart - normal rate, regular rhythm, normal S1, S2, no murmurs, rubs, clicks or gallops  Abdomen - soft, nontender, nondistended, no masses or organomegaly  Extremities - peripheral pulses normal, no pedal edema, no clubbing or cyanosis     Results Review:     I reviewed the " "patient's new clinical results.    WBC WBC   Date Value Ref Range Status   07/08/2025 5.41 3.40 - 10.80 10*3/mm3 Final   07/07/2025 5.97 3.40 - 10.80 10*3/mm3 Final   07/06/2025 6.72 3.40 - 10.80 10*3/mm3 Final      HGB Hemoglobin   Date Value Ref Range Status   07/08/2025 8.1 (L) 12.0 - 15.9 g/dL Final   07/07/2025 8.2 (L) 12.0 - 15.9 g/dL Final   07/06/2025 8.3 (L) 12.0 - 15.9 g/dL Final      HCT Hematocrit   Date Value Ref Range Status   07/08/2025 25.9 (L) 34.0 - 46.6 % Final   07/07/2025 27.6 (L) 34.0 - 46.6 % Final   07/06/2025 26.4 (L) 34.0 - 46.6 % Final      Platlets No results found for: \"LABPLAT\"   MCV MCV   Date Value Ref Range Status   07/08/2025 87.2 79.0 - 97.0 fL Final   07/07/2025 90.2 79.0 - 97.0 fL Final   07/06/2025 87.4 79.0 - 97.0 fL Final          Sodium Sodium   Date Value Ref Range Status   07/08/2025 144 136 - 145 mmol/L Final   07/07/2025 145 136 - 145 mmol/L Final   07/06/2025 146 (H) 136 - 145 mmol/L Final      Potassium Potassium   Date Value Ref Range Status   07/08/2025 3.6 3.5 - 5.2 mmol/L Final   07/07/2025 3.5 3.5 - 5.2 mmol/L Final   07/06/2025 3.8 3.5 - 5.2 mmol/L Final      Chloride Chloride   Date Value Ref Range Status   07/08/2025 112 (H) 98 - 107 mmol/L Final   07/07/2025 114 (H) 98 - 107 mmol/L Final   07/06/2025 114 (H) 98 - 107 mmol/L Final      CO2 CO2   Date Value Ref Range Status   07/08/2025 22.0 22.0 - 29.0 mmol/L Final   07/07/2025 23.5 22.0 - 29.0 mmol/L Final   07/06/2025 26.0 22.0 - 29.0 mmol/L Final      BUN BUN   Date Value Ref Range Status   07/08/2025 15.0 8.0 - 23.0 mg/dL Final   07/07/2025 21.0 8.0 - 23.0 mg/dL Final   07/06/2025 28.0 (H) 8.0 - 23.0 mg/dL Final      Creatinine Creatinine   Date Value Ref Range Status   07/08/2025 1.29 (H) 0.57 - 1.00 mg/dL Final   07/07/2025 1.42 (H) 0.57 - 1.00 mg/dL Final   07/06/2025 1.81 (H) 0.57 - 1.00 mg/dL Final      Calcium Calcium   Date Value Ref Range Status   07/08/2025 7.3 (L) 8.2 - 9.6 mg/dL Final   07/07/2025 " "7.6 (L) 8.2 - 9.6 mg/dL Final   07/06/2025 7.5 (L) 8.2 - 9.6 mg/dL Final      PO4 No results found for: \"CAPO4\"   Albumin Albumin   Date Value Ref Range Status   07/08/2025 2.1 (L) 3.5 - 5.2 g/dL Final      Magnesium No results found for: \"MG\"     Uric Acid No results found for: \"URICACID\"     Medication Review:     Current Facility-Administered Medications:     acetaminophen (TYLENOL) tablet 650 mg, 650 mg, Oral, Q4H PRN **OR** acetaminophen (TYLENOL) 160 MG/5ML oral solution 650 mg, 650 mg, Oral, Q4H PRN **OR** acetaminophen (TYLENOL) suppository 650 mg, 650 mg, Rectal, Q4H PRN, Joel Beebe MD    carbidopa-levodopa (SINEMET)  MG per tablet 1 tablet, 1 tablet, Oral, BID, Joel Beebe MD, 1 tablet at 07/08/25 0727    ceFAZolin 2000 mg IVPB in 100 mL NS (MBP), 2,000 mg, Intravenous, Q12H, Emeka Broussard MD, Currently Infusing at 07/08/25 0728    cholecalciferol (VITAMIN D3) tablet 5,000 Units, 5,000 Units, Oral, Daily, Joel Beebe MD, 5,000 Units at 07/08/25 0727    folic acid (FOLVITE) tablet 1 mg, 1 mg, Oral, Daily, Alexys Castano MD PhD, 1 mg at 07/08/25 0727    hydrocortisone-bacitracin-zinc oxide-nystatin (MAGIC BARRIER) ointment 1 Application, 1 Application, Topical, BID, Trace Erickson MD, 1 Application at 07/08/25 0728    levothyroxine (SYNTHROID, LEVOTHROID) tablet 75 mcg, 75 mcg, Oral, Q AM, Joel Beebe MD, 75 mcg at 07/08/25 0642    metoprolol tartrate (LOPRESSOR) tablet 12.5 mg, 12.5 mg, Oral, BID, David Sandoval MD, 12.5 mg at 07/08/25 0727    nitroglycerin (NITROSTAT) SL tablet 0.4 mg, 0.4 mg, Sublingual, Q5 Min PRN, Joel Beebe MD    ondansetron ODT (ZOFRAN-ODT) disintegrating tablet 4 mg, 4 mg, Oral, Q6H PRN **OR** ondansetron (ZOFRAN) injection 4 mg, 4 mg, Intravenous, Q6H PRN, Joel Beebe MD    [COMPLETED] Insert Peripheral IV, , , Once **AND** sodium chloride 0.9 % flush 10 mL, 10 mL, Intravenous, PRN, Joel Beebe MD    sodium " chloride 0.9 % flush 10 mL, 10 mL, Intravenous, Q12H, Joel Beebe MD, 10 mL at 07/08/25 0729    sodium chloride 0.9 % flush 10 mL, 10 mL, Intravenous, PRN, Joel Beebe MD    sodium chloride 0.9 % infusion 40 mL, 40 mL, Intravenous, PRN, Joel Beebe MD      Assessment & Plan       E. coli UTI (urinary tract infection)    Hypovolemic shock    Acute kidney injury    Left ureteral calculus    Severe sepsis with thrombocytopenia    Folate deficiency    Splenomegaly, congestive, chronic    Cirrhosis of liver with ascites    Iron deficiency anemia      Assessment & Plan  FAMILIA, improving  CKD, stage IIIa (baseline around 1.2)  Obstructive uropathy s/p stent placement.   Thrombocytopenia   Metabolic acidosis   Hypernatremia, Stable      Plan:  Renal function continues to slowly improve, creatinine today is near baseline  She is looking volume overloaded on exam so we will stop IV fluids  She does have a current diet order  Replace potassium and phosphorus per protocol.  Calcium corrects to normal with low albumin  UOP improved as well as metabolic acidosis   Will follow.  Discussed with family at bedside  Discharge planning underway      Bar Lang MD  [facility]  07/08/25  13:46 EDT

## 2025-07-08 NOTE — CONSULTS
Patient Name: Agueda Krishna  YOB: 1925  MRN: [id number]  Admission date: 7/1/2025  Reason for Encounter: MD Consult  and [facility] Clinical Nutrition Assessment     Subjective    Subjective Information     Pt is a 89 y.o. female with a history of stroke, hyperlipidemia, hypertension who presented with acute weakness. Pt's family reporting episodes of vomiting and diarrhea prior to admission. 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    7/2: Visited pt bedside. Family present and was able to provide majority of nutrition r/t history. Pt lives home alone. Family reports fair appetite and states pt typically eats breakfast in the morning and will have smaller meals during the day. Pt does not drink oral nutrition supplements at home, declines them here. Family states some possible gradual wt loss over the last few months but not a significant amount. No significant wt loss per wt hx. Some visual muscle wasting noticed, however, based on nutrition hx provided by family, suspect wasting more likely r/t to aging rather than pt's nutritional status.    7/8: VFSS completed yesterday, SLP recommended NPO d/t dysphagia. MD discussed this with pt's sons who decided against a feeding tube and instead chose to opt for oral intakes with acknowledged risks of aspiration. Will order Boost TID to help support po intakes.     Assessment    H&P and Current Problems      Current Problems   Admission Diagnosis:  Hypovolemic shock [R57.1]  Lactic acidosis [E87.20]  Thrombocytopenia [D69.6]  Left ureteral calculus [N20.1]  Acute kidney injury [N17.9]  Vomiting and diarrhea [R11.10, R19.7]  Anemia, unspecified type [D64.9]  Leukocytosis, unspecified type [D72.829]    Problem List:    E. coli UTI (urinary tract infection)    Hypovolemic shock    Acute kidney injury    Left ureteral calculus    Severe sepsis with thrombocytopenia    Folate deficiency     "Splenomegaly, congestive, chronic    Cirrhosis of liver with ascites    Iron deficiency anemia       Anthropometrics      BMI, Height, Weight Estimated body mass index is 25.76 kg/m² as calculated from the following:    Height as of this encounter: 167.6 cm (66\").    Weight as of this encounter: 72.4 kg (159 lb 9.8 oz).    Weight Method: Bed scale       Trending Weight Changes 7/2:No significant changes  7/8: No new wt       Weight History  Wt Readings from Last 10 Encounters:   07/02/25 72.4 kg (159 lb 9.8 oz)   11/17/22 74.8 kg (165 lb)   10/24/22 74.8 kg (165 lb)   10/10/22 63.5 kg (140 lb)        Labs      Comment: Reviewed.     Results from last 7 days   Lab Units 07/08/25  0955 07/07/25  0700 07/06/25  0618 07/05/25  0441 07/04/25  0717 07/03/25  0556 07/02/25  1143 07/02/25  1143 07/01/25  2345 07/01/25  2049 07/01/25  1516   SODIUM mmol/L 144 145 146* 142   < > 144   < > 141  --   --   --    POTASSIUM mmol/L 3.6 3.5 3.8 3.5   < > 4.3   < > 5.1  --   --   --    GLUCOSE mg/dL 110* 117* 96 84   < > 129*   < > 207*  --   --   --    BUN mg/dL 15.0 21.0 28.0* 40.0*   < > 56.0*   < > 49.0*  --   --   --    CREATININE mg/dL 1.29* 1.42* 1.81* 2.06*   < > 3.13*   < > 2.90*  --   --   --    CALCIUM mg/dL 7.3* 7.6* 7.5* 7.3*   < > 7.5*   < > 7.8*  --   --   --    IONIZED CALCIUM mmol/L  --   --   --   --   --  1.10*  --  1.07*  --   --   --    PHOSPHORUS mg/dL 2.3*  --   --  3.5  --   --   --  5.1*  --   --   --    MAGNESIUM mg/dL  --   --   --   --   --   --   --  1.7  --   --   --    ALBUMIN g/dL 2.1*  --   --  2.1*  --  2.4*   < > 2.5*  --   --   --    LACTATE mmol/L  --   --   --   --   --   --   --   --  1.7 2.2* 5.5*   BILIRUBIN mg/dL  --   --   --   --   --  <0.2  --  0.5  --   --   --    ALK PHOS U/L  --   --   --   --   --  86  --  104  --   --   --    AST (SGOT) U/L  --   --   --   --   --  31  --  60*  --   --   --    ALT (SGPT) U/L  --   --   --   --   --  14  --  24  --   --   --     < > = values in this " "interval not displayed.     Results from last 7 days   Lab Units 07/08/25  0759 07/07/25  0700 07/06/25  0618   PLATELETS 10*3/mm3 33*  33* 30* 31*   HEMOGLOBIN g/dL 8.1* 8.2* 8.3*   HEMATOCRIT % 25.9* 27.6* 26.4*     No results found for: \"HGBA1C\"       Medications       Scheduled Medications carbidopa-levodopa, 1 tablet, Oral, BID  ceFAZolin, 2,000 mg, Intravenous, Q12H  cholecalciferol, 5,000 Units, Oral, Daily  folic acid, 1 mg, Oral, Daily  hydrocortisone-bacitracin-zinc oxide-nystatin, 1 Application, Topical, BID  levothyroxine, 75 mcg, Oral, Q AM  metoprolol tartrate, 12.5 mg, Oral, BID  sodium chloride, 10 mL, Intravenous, Q12H        Infusions        PRN Medications   acetaminophen **OR** acetaminophen **OR** acetaminophen    nitroglycerin    ondansetron ODT **OR** ondansetron    [COMPLETED] Insert Peripheral IV **AND** sodium chloride    sodium chloride    sodium chloride     Physical Findings      Chewing/Swallowing    SLP following and Dysphagia   Dentition Mouth/Teeth WDL: .WDL except, teeth   Teeth Symptoms: tooth/teeth missing   Edema   lower extremity , upper extremity, 1+ (trace), 2+ (mild), 3+ (moderate), 4+ (severe)    Gastrointestinal fecal incontinence, non-distended , normoactive, last bowel movement: 7/7   Skin bruising, pale    Lines/Drains none   I/O reviewed      Nutrition Focused Physical Exam     NFPE 07/02/25 assessed pt visually d/t pt being aphasic from previous stroke, legally blind and hard of hearing - muscle wasting visually noticed though suspect this is related to aging rather than nutritional status based on nutrition hx provided by family     Malnutrition Severity Assessment            (1)   Current Nutrition Orders & Evaluation of Intake      Oral Nutrition     Food Allergies  and Intolerances NKFA   Current PO Diet Diet: Regular/House; Fluid Consistency: Thin (IDDSI 0)   Oral Nutrition Supplement None     Trending % PO Intake Limited po intake recorded   (2)  Assessment & " Plan   Nutrition Diagnosis and Goals       Nutrition Diagnosis Problem: Swallowing Difficulty  Etiology: Medical Diagnosis - Dysphagia    Signs/Symptoms: SLP/Swallow Evaluation        Goal(s) Accepts Oral Nutrition Supplement, Tolerates PO Diet , and No Significant Weight Loss     Nutrition Intervention and Prescription       Intervention  Start oral nutrition supplement      Diet Prescription -    Supplement Prescription Boost TID    Education Provided  -   (3)  Monitoring/Evaluation       Monitor/Evaluation Per Protocol, PO Intake, and Weight      Electronically signed by:  Bette Mendez RD  07/08/25 14:30 EDT

## 2025-07-09 ENCOUNTER — APPOINTMENT (OUTPATIENT)
Dept: CARDIOLOGY | Facility: HOSPITAL | Age: OVER 89
DRG: 853 | End: 2025-07-09
Payer: MEDICARE

## 2025-07-09 LAB
ALBUMIN SERPL-MCNC: 2.2 G/DL (ref 3.5–5.2)
ANION GAP SERPL CALCULATED.3IONS-SCNC: 9 MMOL/L (ref 5–15)
BH CV LOW VAS LEFT GASTRONEMIUS VESSEL: 1
BH CV LOWER VASCULAR LEFT COMMON FEMORAL AUGMENT: NORMAL
BH CV LOWER VASCULAR LEFT COMMON FEMORAL COMPETENT: NORMAL
BH CV LOWER VASCULAR LEFT COMMON FEMORAL COMPRESS: NORMAL
BH CV LOWER VASCULAR LEFT COMMON FEMORAL PHASIC: NORMAL
BH CV LOWER VASCULAR LEFT COMMON FEMORAL SPONT: NORMAL
BH CV LOWER VASCULAR LEFT DISTAL FEMORAL COMPRESS: NORMAL
BH CV LOWER VASCULAR LEFT GASTRONEMIUS COMPRESS: NORMAL
BH CV LOWER VASCULAR LEFT GASTRONEMIUS THROMBUS: NORMAL
BH CV LOWER VASCULAR LEFT GREATER SAPH AK COMPRESS: NORMAL
BH CV LOWER VASCULAR LEFT GREATER SAPH BK COMPRESS: NORMAL
BH CV LOWER VASCULAR LEFT LESSER SAPH COMPRESS: NORMAL
BH CV LOWER VASCULAR LEFT MID FEMORAL AUGMENT: NORMAL
BH CV LOWER VASCULAR LEFT MID FEMORAL COMPETENT: NORMAL
BH CV LOWER VASCULAR LEFT MID FEMORAL COMPRESS: NORMAL
BH CV LOWER VASCULAR LEFT MID FEMORAL PHASIC: NORMAL
BH CV LOWER VASCULAR LEFT MID FEMORAL SPONT: NORMAL
BH CV LOWER VASCULAR LEFT PERONEAL COMPRESS: NORMAL
BH CV LOWER VASCULAR LEFT POPLITEAL AUGMENT: NORMAL
BH CV LOWER VASCULAR LEFT POPLITEAL COMPETENT: NORMAL
BH CV LOWER VASCULAR LEFT POPLITEAL COMPRESS: NORMAL
BH CV LOWER VASCULAR LEFT POPLITEAL PHASIC: NORMAL
BH CV LOWER VASCULAR LEFT POPLITEAL SPONT: NORMAL
BH CV LOWER VASCULAR LEFT POSTERIOR TIBIAL COMPETENT: NORMAL
BH CV LOWER VASCULAR LEFT POSTERIOR TIBIAL COMPRESS: NORMAL
BH CV LOWER VASCULAR LEFT PROFUNDA FEMORAL COMPRESS: NORMAL
BH CV LOWER VASCULAR LEFT PROXIMAL FEMORAL COMPRESS: NORMAL
BH CV LOWER VASCULAR LEFT SAPHENOFEMORAL JUNCTION COMPRESS: NORMAL
BH CV LOWER VASCULAR RIGHT COMMON FEMORAL AUGMENT: NORMAL
BH CV LOWER VASCULAR RIGHT COMMON FEMORAL COMPETENT: NORMAL
BH CV LOWER VASCULAR RIGHT COMMON FEMORAL COMPRESS: NORMAL
BH CV LOWER VASCULAR RIGHT COMMON FEMORAL PHASIC: NORMAL
BH CV LOWER VASCULAR RIGHT COMMON FEMORAL SPONT: NORMAL
BH CV LOWER VASCULAR RIGHT DISTAL FEMORAL COMPRESS: NORMAL
BH CV LOWER VASCULAR RIGHT GASTRONEMIUS COMPRESS: NORMAL
BH CV LOWER VASCULAR RIGHT GREATER SAPH AK COMPRESS: NORMAL
BH CV LOWER VASCULAR RIGHT GREATER SAPH BK COMPRESS: NORMAL
BH CV LOWER VASCULAR RIGHT LESSER SAPH COMPRESS: NORMAL
BH CV LOWER VASCULAR RIGHT MID FEMORAL AUGMENT: NORMAL
BH CV LOWER VASCULAR RIGHT MID FEMORAL COMPETENT: NORMAL
BH CV LOWER VASCULAR RIGHT MID FEMORAL COMPRESS: NORMAL
BH CV LOWER VASCULAR RIGHT MID FEMORAL PHASIC: NORMAL
BH CV LOWER VASCULAR RIGHT MID FEMORAL SPONT: NORMAL
BH CV LOWER VASCULAR RIGHT PERONEAL COMPRESS: NORMAL
BH CV LOWER VASCULAR RIGHT POPLITEAL AUGMENT: NORMAL
BH CV LOWER VASCULAR RIGHT POPLITEAL COMPETENT: NORMAL
BH CV LOWER VASCULAR RIGHT POPLITEAL COMPRESS: NORMAL
BH CV LOWER VASCULAR RIGHT POPLITEAL PHASIC: NORMAL
BH CV LOWER VASCULAR RIGHT POPLITEAL SPONT: NORMAL
BH CV LOWER VASCULAR RIGHT POSTERIOR TIBIAL COMPRESS: NORMAL
BH CV LOWER VASCULAR RIGHT PROFUNDA FEMORAL COMPRESS: NORMAL
BH CV LOWER VASCULAR RIGHT PROXIMAL FEMORAL COMPRESS: NORMAL
BH CV LOWER VASCULAR RIGHT SAPHENOFEMORAL JUNCTION COMPRESS: NORMAL
BUN SERPL-MCNC: 14 MG/DL (ref 8–23)
BUN/CREAT SERPL: 12.2 (ref 7–25)
CALCIUM SPEC-SCNC: 7.6 MG/DL (ref 8.2–9.6)
CHLORIDE SERPL-SCNC: 112 MMOL/L (ref 98–107)
CO2 SERPL-SCNC: 21 MMOL/L (ref 22–29)
CREAT SERPL-MCNC: 1.15 MG/DL (ref 0.57–1)
DEPRECATED RDW RBC AUTO: 44.4 FL (ref 37–54)
EGFRCR SERPLBLD CKD-EPI 2021: 42.9 ML/MIN/1.73
ERYTHROCYTE [DISTWIDTH] IN BLOOD BY AUTOMATED COUNT: 14.5 % (ref 12.3–15.4)
GLUCOSE SERPL-MCNC: 80 MG/DL (ref 65–99)
HCT VFR BLD AUTO: 25 % (ref 34–46.6)
HGB BLD-MCNC: 8.1 G/DL (ref 12–15.9)
MCH RBC QN AUTO: 27.7 PG (ref 26.6–33)
MCHC RBC AUTO-ENTMCNC: 32.4 G/DL (ref 31.5–35.7)
MCV RBC AUTO: 85.6 FL (ref 79–97)
PHOSPHATE SERPL-MCNC: 2.5 MG/DL (ref 2.5–4.5)
PLATELET # BLD AUTO: 43 10*3/MM3 (ref 140–450)
POTASSIUM SERPL-SCNC: 4.4 MMOL/L (ref 3.5–5.2)
RBC # BLD AUTO: 2.92 10*6/MM3 (ref 3.77–5.28)
SODIUM SERPL-SCNC: 142 MMOL/L (ref 136–145)
WBC NRBC COR # BLD AUTO: 5.93 10*3/MM3 (ref 3.4–10.8)

## 2025-07-09 PROCEDURE — 25010000002 FUROSEMIDE PER 20 MG: Performed by: INTERNAL MEDICINE

## 2025-07-09 PROCEDURE — 93970 EXTREMITY STUDY: CPT | Performed by: SURGERY

## 2025-07-09 PROCEDURE — 80069 RENAL FUNCTION PANEL: CPT | Performed by: INTERNAL MEDICINE

## 2025-07-09 PROCEDURE — 99232 SBSQ HOSP IP/OBS MODERATE 35: CPT | Performed by: INTERNAL MEDICINE

## 2025-07-09 PROCEDURE — G0545 PR INHERENT VISIT TO INPT: HCPCS | Performed by: INTERNAL MEDICINE

## 2025-07-09 PROCEDURE — 85027 COMPLETE CBC AUTOMATED: CPT | Performed by: INTERNAL MEDICINE

## 2025-07-09 PROCEDURE — 97530 THERAPEUTIC ACTIVITIES: CPT

## 2025-07-09 PROCEDURE — 93970 EXTREMITY STUDY: CPT

## 2025-07-09 RX ORDER — FUROSEMIDE 10 MG/ML
40 INJECTION INTRAMUSCULAR; INTRAVENOUS ONCE
Status: COMPLETED | OUTPATIENT
Start: 2025-07-09 | End: 2025-07-09

## 2025-07-09 RX ADMIN — Medication 5000 UNITS: at 10:22

## 2025-07-09 RX ADMIN — METOPROLOL TARTRATE 12.5 MG: 25 TABLET, FILM COATED ORAL at 21:17

## 2025-07-09 RX ADMIN — FUROSEMIDE 40 MG: 10 INJECTION, SOLUTION INTRAMUSCULAR; INTRAVENOUS at 18:31

## 2025-07-09 RX ADMIN — ZINC OXIDE 1 APPLICATION: 200 OINTMENT TOPICAL at 21:18

## 2025-07-09 RX ADMIN — FOLIC ACID 1 MG: 1 TABLET ORAL at 10:23

## 2025-07-09 RX ADMIN — LEVOTHYROXINE SODIUM 75 MCG: 0.07 TABLET ORAL at 06:17

## 2025-07-09 RX ADMIN — CARBIDOPA AND LEVODOPA 1 TABLET: 25; 100 TABLET ORAL at 10:23

## 2025-07-09 RX ADMIN — FERROUS SULFATE TAB 325 MG (65 MG ELEMENTAL FE) 325 MG: 325 (65 FE) TAB at 10:23

## 2025-07-09 RX ADMIN — Medication 10 ML: at 21:17

## 2025-07-09 RX ADMIN — METOPROLOL TARTRATE 12.5 MG: 25 TABLET, FILM COATED ORAL at 10:23

## 2025-07-09 RX ADMIN — ZINC OXIDE 1 APPLICATION: 200 OINTMENT TOPICAL at 10:23

## 2025-07-09 RX ADMIN — CARBIDOPA AND LEVODOPA 1 TABLET: 25; 100 TABLET ORAL at 21:17

## 2025-07-09 NOTE — CASE MANAGEMENT/SOCIAL WORK
Continued Stay Note  [facility]     Patient Name: Agueda Krishna  MRN: [id number]  Today's Date: 7/9/2025    Admit Date: 7/1/2025    Plan: [facility] when medically stable   Discharge Plan       Row Name 07/09/25 1326       Plan    Plan [facility] when medically stable    Patient/Family in Agreement with Plan yes    Plan Comments Patient discussed in walking rounds and clinical chart reviewed. IV antibiotics are completed. Ultra sound to be ordered for bilateral leg swelling along with IV Lasix. [place] [facility] [place] available [place]. CCP to follow. CD, CSW.                   Discharge Codes    No documentation.                 Expected Discharge Date and Time       Expected Discharge Date Expected Discharge Time    Jul 10, 2025

## 2025-07-09 NOTE — PROGRESS NOTES
[facility] INPATIENT PROGRESS NOTE    Length of Stay:  8 days    CHIEF COMPLAINT:  Septic shock with pyelonephritis, E. coli bacteremia, obstructive nephrolithiasis, FAMILIA/CKD3a, acute on chronic thrombocytopenia, anemia, cirrhosis, Parkinson's disease, history of stroke    SUBJECTIVE:   Patient with complaint of discomfort in the left upper extremity with wrapping in place.    ROS:  Review of Systems   Limited review of systems obtained with pertinent positive findings as noted in the interval history above.  All of the systems negative.    OBJECTIVE:  Vitals:    07/08/25 0730 07/08/25 1928 07/08/25 2337 07/09/25 0712   BP: 134/58 138/57 130/53 148/58   BP Location: Right arm   Left arm   Patient Position: Lying   Lying   Pulse: 63 66 51 60   Resp: 18 16 16 18   Temp: 98.5 °F (36.9 °C) 97.3 °F (36.3 °C) 97.9 °F (36.6 °C) 97.5 °F (36.4 °C)   TempSrc: Oral Oral Oral Axillary   SpO2: 98% 100% 97% 98%   Weight:       Height:             PHYSICAL EXAMINATION:  General: Awake and alert, no distress  Chest/Lungs: Clear to auscultation bilaterally anteriorly  Heart: Regular rate and rhythm  Abdomen/GI: Soft nontender nondistended bowel sounds present  Extremities: Trace-1+ bilateral lower extremity edema.  Left upper extremity is wrapped        DIAGNOSTIC DATA:  Results Review:     I reviewed the patient's new clinical results.    Results from last 7 days   Lab Units 07/09/25  0659 07/08/25  0759 07/07/25  0700   WBC 10*3/mm3 5.93 5.41 5.97   HEMOGLOBIN g/dL 8.1* 8.1* 8.2*   HEMATOCRIT % 25.0* 25.9* 27.6*   PLATELETS 10*3/mm3 43* 33*  33* 30*      Results from last 7 days   Lab Units 07/08/25  0955 07/07/25  0700 07/06/25  0618 07/04/25  0717 07/03/25  0556 07/02/25  1143   SODIUM mmol/L 144 145 146*   < > 144 141   POTASSIUM mmol/L 3.6 3.5 3.8   < > 4.3 5.1   CHLORIDE mmol/L 112* 114* 114*   < > 113* 109*   CO2 mmol/L 22.0 23.5 26.0   < > 19.7* 21.0*   BUN mg/dL 15.0 21.0 28.0*   < > 56.0* 49.0*   CREATININE  mg/dL 1.29* 1.42* 1.81*   < > 3.13* 2.90*   CALCIUM mg/dL 7.3* 7.6* 7.5*   < > 7.5* 7.8*   BILIRUBIN mg/dL  --   --   --   --  <0.2 0.5   ALK PHOS U/L  --   --   --   --  86 104   ALT (SGPT) U/L  --   --   --   --  14 24   AST (SGOT) U/L  --   --   --   --  31 60*   GLUCOSE mg/dL 110* 117* 96   < > 129* 207*    < > = values in this interval not displayed.      Lab Results   Component Value Date    NEUTROABS 11.18 (H) 07/04/2025     Results from last 7 days   Lab Units 07/06/25  0618   INR  1.20*     Results from last 7 days   Lab Units 07/02/25  1143   MAGNESIUM mg/dL 1.7           Assessment & Plan   ASSESSMENT/PLAN:  This is a 89 y.o. female with:     *Septic shock with pyelonephritis, E. coli bacteremia, obstructive nephrolithiasis  CT abdomen pelvis 7/1/2025 with 2.1 cm stone left renal pelvis with left pelviectasis and perinephric fat stranding suggesting obstruction and UTI/pyelonephritis, bladder wall thickening and perivesical fat stranding possibly secondary to cystitis.  Blood cultures 7/1/2025 positive for E. Coli  Patient currently continuing on cefazolin  Infectious disease following    *FAMILIA/CKD3a  Baseline creatinine around 1.2  On admission creatinine 2.88 with increased to 3.13 on 7/3/2025  Obstructive uropathy status post left ureteral stent placement on 7/1/2025  Creatinine today improved at 1.15    *Acute on chronic thrombocytopenia  Patient with chronic thrombocytopenia dating back to at least 2015 with platelet count in the low to mid 100,000 range  Evidence of underlying cirrhosis on imaging studies which may be contributing factor to chronic thrombocytopenia  Platelet count prior to admission on 6/4/25 was 84,000  Platelet count on admission is 7/1/2025 was 36,000, decline to 22,000 following admission.  IPF elevated at 13.5%  Additional labs on 7/2/2025 with B12 842, folate low at 4.24, LDH borderline elevated at 231, IPF elevated at 15.1%  Acute on chronic thrombocytopenia felt to likely be  consumptive in nature related to current infection/sepsis  Platelet count today improved at 43,000.     *Anemia  Patient with history of anemia, labs prior to admission on 6/9/2025 with hemoglobin 10.5, MCV 88.6, iron 18, ferritin 40, iron saturation 5%, TIBC 369 consistent with iron deficiency anemia  On admission 7/1/2025 hemoglobin was 9.2  Additional labs on 7/1/2025 with iron 14, ferritin 154, iron saturation 4%, TIBC 373, B12 842, folate low at 4.24  Stool for occult blood positive on 7/2/2025.  Patient is not a candidate for endoscopic evaluation  Patient is receiving oral folic acid 1 mg daily for folate deficiency  Hemoglobin did decline down to 7.5 on 7/4/25  With low iron saturation at 4%, some suspicion for concurrent iron deficiency, particularly in light of stool positive for occult blood.    On 7/8/2025 initiated oral iron sulfate every other day  Hemoglobin today stable at 8.1.     *Cirrhosis  CT abdomen pelvis on 7/1/2025 with heterogeneous hepatic attenuation with subtle nodular liver contours possibly reflecting underlying hepatocellular disease/cirrhosis with evidence of portal venous hypertension including splenomegaly (14.5 cm), recanalization of periumbilical vein, trace ascites.  Viral hepatitis A, B, C panel negative on 7/2/2025  Possibly contributing factors of the patient's chronic mild thrombocytopenia    *Parkinson's disease  Carbidopa/levodopa 25/100 twice daily    *History of stroke    *Chronic left calf DVT  Doppler on 7/9/2025 with chronic left lower extremity calf DVT    *Disposition  Patient's family has made decision not to pursue placement of feeding tube in relation to swallowing issues  Patient planning discharge to subacute nursing facility possibly tomorrow      Plan:  No current need for platelet transfusion unless bleeding occurs  Patient continuing on cefazolin  Continue oral iron sulfate every other day  Daily CBC/differential    Discussed with patient and son at  bedside           Ernesto Gilmore MD

## 2025-07-09 NOTE — SIGNIFICANT NOTE
07/09/25 1524   OTHER   Discipline occupational therapist   Rehab Time/Intention   Session Not Performed patient unavailable for treatment  (off floor in testing this PM and working with PT this AM. Unable to see today for OT with multiple attempts made.)

## 2025-07-09 NOTE — PLAN OF CARE
Goal Outcome Evaluation:  Plan of Care Reviewed With: patient        Progress: no change  Outcome Evaluation: Pt supine in bed upon entry for PT treatment session on this date and is agreeable to participation. Pt is difficult to understand at times but is able to respond to yes/no questions with some consistency. Pt is able to complete supine to sit with min A for trunk support as needed. Once seated EOB, patient denies and SOA/dizziness and is able to complete STS with min A. Pt is able to ambulate with FWW for a total of 50' and CGA with slow andrei and cues for proper gait mechanics and to maintain safe distance to the walker. Once returning the room and to the bedside chair, patient is able to complete seated LAQ without any complaints. Pt remains appropriate for skilled PT services to address ongoing deficits. Anticipate d/c to SNF at the time of leaving the hospital.    Anticipated Discharge Disposition (PT): skilled nursing facility

## 2025-07-09 NOTE — PROGRESS NOTES
ID note for bacteremia  S: No pain. AF. Palliative note reviewed and planning dc to follow as outpatient    Physical Exam:   Vital Signs   Temp:  [97.3 °F (36.3 °C)-97.9 °F (36.6 °C)] 97.5 °F (36.4 °C)  Heart Rate:  [51-66] 60  Resp:  [16-18] 18  BP: (130-148)/(53-58) 148/58    GENERAL: Awake and alert, in no acute distress.   HEENT: Oropharynx is clear. Hearing is grossly [place].   EYES: . No conjunctival injection. No lid lag.   LUNGS:normal respiratory effort.   SKIN: no cutaneous eruptions in exposed areas       Results Review:  WBC 5.9  Cr 1.15, plt 43  Blood culture 2/2 E. coli      A/p  E. coli bacteremia secondary to pyelonephritis and obstructing nephrolithiasis status post stent placement  Acute kidney injury on chronic kidney disease stage 3, antibiotics dosed appropriately  Thrombocytopenia secondary to sepsis    Seems to be doing well.  We will stop abx now  Will need definitive management of the stone down the road.  Plan to DC to rehab soon.  D/w son this AM  Nothing more to add and we will sign off                                                        The above decisions regarding antimicrobials incorporates elements of engaging in complex medical decision-making associated with antimicrobial prescribing including considerations like antimicrobial resistance patterns, interactions/complications from comorbidities including concurrent infections, public health considerations to minimize development of antimicrobial resistance, recent antibiotic exposure,

## 2025-07-09 NOTE — THERAPY TREATMENT NOTE
Patient Name: Agueda Krishna  : 10/20/1925    MRN: [id number]                              Today's Date: 2025       Admit Date: 2025    Visit Dx:     ICD-10-CM ICD-9-CM   1. Left ureteral calculus  N20.1 592.1   2. Hypovolemic shock  R57.1 785.59   3. Vomiting and diarrhea  R11.10 787.03    R19.7 787.91   4. Anemia, unspecified type  D64.9 285.9   5. Thrombocytopenia  D69.6 287.5   6. Leukocytosis, unspecified type  D72.829 288.60   7. Lactic acidosis  E87.20 276.2   8. Acute kidney injury  N17.9 584.9     Patient Active Problem List   Diagnosis    Hypovolemic shock    Acute kidney injury    Left ureteral calculus    Severe sepsis with thrombocytopenia    Folate deficiency    Splenomegaly, congestive, chronic    Cirrhosis of liver with ascites    Iron deficiency anemia    E. coli UTI (urinary tract infection)     Past Medical History:   Diagnosis Date    History of stroke 2009    Hyperlipidemia     Hypertension     Hypothyroid      Past Surgical History:   Procedure Laterality Date    CATARACT EXTRACTION Bilateral     CYSTOSCOPY, RETROGRADE PYELOGRAM AND STENT INSERTION Left 2025    Procedure: CYSTOSCOPY, RETROGRADE PYELOGRAM, AND LEFT STENT INSERTION;  Surgeon: Joel Beebe MD;  Location: [facility];  Service: Urology;  Laterality: Left;    HYSTERECTOMY      REPLACEMENT TOTAL KNEE Left     TOTAL HIP ARTHROPLASTY Right       General Information       Row Name 25 1230          Physical Therapy Time and Intention    Document Type therapy note (daily note)  -     Mode of Treatment individual therapy;physical therapy  -       Row Name 25 1230          General Information    Patient Profile Reviewed yes  -     Existing Precautions/Restrictions fall  -       Row Name 25 1230          Cognition    Orientation Status (Cognition) oriented to;person;unable/difficult to assess  -       Row Name 25 1230          Safety Issues/Impairments Affecting Functional  Mobility    Safety Issues Affecting Function (Mobility) positioning of assistive device;problem-solving;safety precaution awareness;safety precautions follow-through/compliance;sequencing abilities;judgment  -     Impairments Affecting Function (Mobility) balance;strength;endurance/activity tolerance;postural/trunk control  -     Cognitive Impairments, Mobility Safety/Performance insight into deficits/self-awareness;judgment;problem-solving/reasoning;safety precaution awareness;safety precaution follow-through;sequencing abilities  -     Comment, Safety Issues/Impairments (Mobility) Gait belt donned  -               User Key  (r) = Recorded By, (t) = Taken By, (c) = Cosigned By      Initials Name Provider Type    Biju Lucas, PT Physical Therapist                   Mobility       Row Name 07/09/25 1231          Bed Mobility    Bed Mobility scooting/bridging;supine-sit  -     Scooting/Bridging [place] (Bed Mobility) contact guard;1 person assist  -     Supine-Sit [place] (Bed Mobility) minimum assist (75% patient effort);1 person assist  -       Row Name 07/09/25 1231          Sit-Stand Transfer    Sit-Stand [place] (Transfers) minimum assist (75% patient effort);1 person assist  -     Assistive Device (Sit-Stand Transfers) walker, front-wheeled  -       Row Name 07/09/25 1231          Gait/Stairs (Locomotion)    [place] Level (Gait) contact guard;1 person assist  -     Assistive Device (Gait) walker, front-wheeled  -     Patient was able to Ambulate yes  -     Distance in Feet (Gait) 50  -     Deviations/Abnormal Patterns (Gait) gait speed decreased;stride length decreased;weight shifting decreased;andrei decreased  -     Bilateral Gait Deviations heel strike decreased;forward flexed posture;weight shift ability decreased  -               User Key  (r) = Recorded By, (t) = Taken By, (c) = Cosigned By      Initials Name Provider Type    Biju Lucas, PT  Physical Therapist                   Obj/Interventions       Row Name 07/09/25 1231          Motor Skills    Therapeutic Exercise --  seated LAQ and marches  -       Row Name 07/09/25 1231          Balance    Balance Assessment sitting static balance;sitting dynamic balance;standing static balance;standing dynamic balance  -     Static Sitting Balance standby assist  -     Dynamic Sitting Balance standby assist  -     Position, Sitting Balance sitting edge of bed  -     Static Standing Balance contact guard  -     Dynamic Standing Balance contact guard  -     Position/Device Used, Standing Balance walker, front-wheeled  -     Balance Interventions sitting;standing;sit to stand;minimal challenge;dynamic;static  -               User Key  (r) = Recorded By, (t) = Taken By, (c) = Cosigned By      Initials Name Provider Type     Biju Harris, PT Physical Therapist                   Goals/Plan    No documentation.                  Clinical Impression       Row Name 07/09/25 1232          Plan of Care Review    Plan of Care Reviewed With patient  -     Progress no change  -     Outcome Evaluation Pt supine in bed upon entry for PT treatment session on this date and is agreeable to participation. Pt is difficult to understand at times but is able to respond to yes/no questions with some consistency. Pt is able to complete supine to sit with min A for trunk support as needed. Once seated EOB, patient denies and SOA/dizziness and is able to complete STS with min A. Pt is able to ambulate with FWW for a total of 50' and CGA with slow andrei and cues for proper gait mechanics and to maintain safe distance to the walker. Once returning the room and to the bedside chair, patient is able to complete seated LAQ without any complaints. Pt remains appropriate for skilled PT services to address ongoing deficits. Anticipate d/c to SNF at the time of leaving the hospital.  -       Row Name 07/09/25 1232           Therapy Assessment/Plan (PT)    Criteria for Skilled Interventions Met (PT) yes  -     Therapy Frequency (PT) 5 times/wk  -       Row Name 07/09/25 1232          Vital Signs    O2 Delivery Pre Treatment room air  -     O2 Delivery Intra Treatment room air  -     O2 Delivery Post Treatment room air  -     Pre Patient Position Supine  -     Intra Patient Position Standing  -MH     Post Patient Position Sitting  -       Row Name 07/09/25 1232          Positioning and Restraints    Pre-Treatment Position in bed  -     Post Treatment Position chair  -     In Chair with family/caregiver;exit alarm on;encouraged to call for assist;call light within reach;reclined  -               User Key  (r) = Recorded By, (t) = Taken By, (c) = Cosigned By      Initials Name Provider Type    Biju Lucas, ERIK Physical Therapist                   Outcome Measures       Row Name 07/09/25 1233          How much help from another person do you currently need...    Turning from your back to your side while in flat bed without using bedrails? 4  -MH     Moving from lying on back to sitting on the side of a flat bed without bedrails? 3  -MH     Moving to and from a bed to a chair (including a wheelchair)? 3  -MH     Standing up from a chair using your arms (e.g., wheelchair, bedside chair)? 3  -MH     Climbing 3-5 steps with a railing? 1  -MH     To walk in hospital room? 3  -     AM-PAC 6 Clicks Score (PT) 17  -     Highest Level of Mobility Goal Stand (1 or More Minutes)-5  -       Row Name 07/09/25 1233          Functional Assessment    Outcome Measure Options AM-PAC 6 Clicks Basic Mobility (PT)  -               User Key  (r) = Recorded By, (t) = Taken By, (c) = Cosigned By      Initials Name Provider Type    Biju Lucas, ERIK Physical Therapist                                 Physical Therapy Education       Title: PT OT SLP Therapies (In Progress)       Topic: Physical Therapy (Done)       Point:  Mobility training (Done)       Learning Progress Summary            Patient Acceptance, E, VU,DU,NR by  at 7/9/2025 1233    Acceptance, E,D, DU by  at 7/6/2025 1727   Family Acceptance, E,D, DU by  at 7/6/2025 1727                      Point: Home exercise program (Done)       Learning Progress Summary            Patient Acceptance, E, VU,DU,NR by  at 7/9/2025 1233    Acceptance, E,D, DU by  at 7/6/2025 1727   Family Acceptance, E,D, DU by  at 7/6/2025 1727                      Point: Body mechanics (Done)       Learning Progress Summary            Patient Acceptance, E, VU,DU,NR by  at 7/9/2025 1233    Acceptance, E,D, DU by  at 7/6/2025 1727   Family Acceptance, E,D, DU by  at 7/6/2025 1727                      Point: Precautions (Done)       Learning Progress Summary            Patient Acceptance, E, VU,DU,NR by  at 7/9/2025 1233    Acceptance, E,D, DU by  at 7/6/2025 1727   Family Acceptance, E,D, DU by  at 7/6/2025 1727                                      User Key       Initials Effective Dates Name Provider Type Discipline     03/07/18 -  Phyllis Mccloud PTA Physical Therapist Assistant PT     09/11/24 -  Biju Harris PT Physical Therapist PT                  PT Recommendation and Plan     Progress: no change  Outcome Evaluation: Pt supine in bed upon entry for PT treatment session on this date and is agreeable to participation. Pt is difficult to understand at times but is able to respond to yes/no questions with some consistency. Pt is able to complete supine to sit with min A for trunk support as needed. Once seated EOB, patient denies and SOA/dizziness and is able to complete STS with min A. Pt is able to ambulate with FWW for a total of 50' and CGA with slow andrei and cues for proper gait mechanics and to maintain safe distance to the walker. Once returning the room and to the bedside chair, patient is able to complete seated LAQ without any complaints. Pt remains  appropriate for skilled PT services to address ongoing deficits. Anticipate d/c to SNF at the time of leaving the hospital.     Time Calculation:         PT Charges       Row Name 07/09/25 1227             Time Calculation    Start Time 1044  -      Stop Time 1104  -      Time Calculation (min) 20 min  -      PT Received On 07/09/25  -      PT - Next Appointment 07/10/25  -         Time Calculation- PT    Total Timed Code Minutes- PT 20 minute(s)  -         Timed Charges    84815 - PT Therapeutic Activity Minutes 20  -MH         Total Minutes    Timed Charges Total Minutes 20  -MH       Total Minutes 20  -MH                User Key  (r) = Recorded By, (t) = Taken By, (c) = Cosigned By      Initials Name Provider Type     Biju Harris, ERIK Physical Therapist                  Therapy Charges for Today       Code Description Service Date Service Provider Modifiers Qty    [id number]  PT THERAPEUTIC ACT EA 15 MIN 7/9/2025 Biju Harris, PT GP 1            PT G-Codes  Outcome Measure Options: AM-PAC 6 Clicks Basic Mobility (PT)  AM-PAC 6 Clicks Score (PT): 17  AM-PAC 6 Clicks Score (OT): 13  Modified [place] Scale: 4 - Moderately severe disability.  Unable to walk without assistance, and unable to attend to own bodily needs without assistance.  PT Discharge Summary  Anticipated Discharge Disposition (PT): skilled nursing facility    Biju Harris PT  7/9/2025

## 2025-07-09 NOTE — PROGRESS NOTES
"RENAL/KCC:     LOS: 8 days   Patient Care Team:  Bety Mccloud APRN as PCP - General (Nurse Practitioner)    Chief Complaint: FAMILIA, obstructive uropathy       History of Present Illness  Agueda Krishna is a 89 y.o. female with h/o ckd III followed by Dr. JAQUI Lang. Admitted with pyelonephritis, familia, and urinary obstruction.  S/p cystoscopy with stent placement.       Subjective  7/7: No acute events.   UOP adequate  Resting in bed denies complaints.  Good oral intake  No nausea vomiting shortness of breath or chest pain    7/8: No complaints other than worsening edema bilateral upper and lower extremities  Diet restarted, no dyspnea    7/9: Main complaint continues to be lower extremity edema.  No shortness of breath chest pain fevers or chills    History taken from: patient chart    Objective     Vital Sign Min/Max for last 24 hours  Temp  Min: 97.3 °F (36.3 °C)  Max: 97.9 °F (36.6 °C)   BP  Min: 130/53  Max: 148/58   Pulse  Min: 51  Max: 66   Resp  Min: 16  Max: 18   SpO2  Min: 97 %  Max: 100 %   No data recorded   No data recorded     Flowsheet Rows      Flowsheet Row First Filed Value   Admission Height 167.6 cm (66\") Documented at 07/01/2025 1234   Admission Weight 70.3 kg (155 lb) Documented at 07/01/2025 1234            No intake/output data recorded.  I/O last 3 completed shifts:  In: 190 [P.O.:90; IV Piggyback:100]  Out: 950 [Urine:950]    Objective:  Vital signs: (most recent): Blood pressure 136/60, pulse 57, temperature 97.3 °F (36.3 °C), temperature source Oral, resp. rate 18, height 167.6 cm (66\"), weight 72.4 kg (159 lb 9.8 oz), SpO2 99%, not currently breastfeeding.            Physical Examination: General appearance -no acute distress, chronically ill-appearing  Chest - clear to auscultation, no wheezes, rales or rhonchi, symmetric air entry  Heart - normal rate, regular rhythm, normal S1, S2, no murmurs, rubs, clicks or gallops  Abdomen - soft, nontender, nondistended, no masses or " "organomegaly  Extremities -2+ edema bilaterally    Results Review:     I reviewed the patient's new clinical results.    WBC WBC   Date Value Ref Range Status   07/09/2025 5.93 3.40 - 10.80 10*3/mm3 Final   07/08/2025 5.41 3.40 - 10.80 10*3/mm3 Final   07/07/2025 5.97 3.40 - 10.80 10*3/mm3 Final      HGB Hemoglobin   Date Value Ref Range Status   07/09/2025 8.1 (L) 12.0 - 15.9 g/dL Final   07/08/2025 8.1 (L) 12.0 - 15.9 g/dL Final   07/07/2025 8.2 (L) 12.0 - 15.9 g/dL Final      HCT Hematocrit   Date Value Ref Range Status   07/09/2025 25.0 (L) 34.0 - 46.6 % Final   07/08/2025 25.9 (L) 34.0 - 46.6 % Final   07/07/2025 27.6 (L) 34.0 - 46.6 % Final      Platlets No results found for: \"LABPLAT\"   MCV MCV   Date Value Ref Range Status   07/09/2025 85.6 79.0 - 97.0 fL Final   07/08/2025 87.2 79.0 - 97.0 fL Final   07/07/2025 90.2 79.0 - 97.0 fL Final          Sodium Sodium   Date Value Ref Range Status   07/09/2025 142 136 - 145 mmol/L Final   07/08/2025 144 136 - 145 mmol/L Final   07/07/2025 145 136 - 145 mmol/L Final      Potassium Potassium   Date Value Ref Range Status   07/09/2025 4.4 3.5 - 5.2 mmol/L Final     Comment:     Slight hemolysis detected by analyzer. Result may be falsely elevated.   07/08/2025 3.6 3.5 - 5.2 mmol/L Final   07/07/2025 3.5 3.5 - 5.2 mmol/L Final      Chloride Chloride   Date Value Ref Range Status   07/09/2025 112 (H) 98 - 107 mmol/L Final   07/08/2025 112 (H) 98 - 107 mmol/L Final   07/07/2025 114 (H) 98 - 107 mmol/L Final      CO2 CO2   Date Value Ref Range Status   07/09/2025 21.0 (L) 22.0 - 29.0 mmol/L Final   07/08/2025 22.0 22.0 - 29.0 mmol/L Final   07/07/2025 23.5 22.0 - 29.0 mmol/L Final      BUN BUN   Date Value Ref Range Status   07/09/2025 14.0 8.0 - 23.0 mg/dL Final   07/08/2025 15.0 8.0 - 23.0 mg/dL Final   07/07/2025 21.0 8.0 - 23.0 mg/dL Final      Creatinine Creatinine   Date Value Ref Range Status   07/09/2025 1.15 (H) 0.57 - 1.00 mg/dL Final   07/08/2025 1.29 (H) 0.57 - " "1.00 mg/dL Final   07/07/2025 1.42 (H) 0.57 - 1.00 mg/dL Final      Calcium Calcium   Date Value Ref Range Status   07/09/2025 7.6 (L) 8.2 - 9.6 mg/dL Final   07/08/2025 7.3 (L) 8.2 - 9.6 mg/dL Final   07/07/2025 7.6 (L) 8.2 - 9.6 mg/dL Final      PO4 No results found for: \"CAPO4\"   Albumin Albumin   Date Value Ref Range Status   07/09/2025 2.2 (L) 3.5 - 5.2 g/dL Final   07/08/2025 2.1 (L) 3.5 - 5.2 g/dL Final      Magnesium No results found for: \"MG\"     Uric Acid No results found for: \"URICACID\"     Medication Review:     Current Facility-Administered Medications:     acetaminophen (TYLENOL) tablet 650 mg, 650 mg, Oral, Q4H PRN, 650 mg at 07/08/25 1705 **OR** acetaminophen (TYLENOL) 160 MG/5ML oral solution 650 mg, 650 mg, Oral, Q4H PRN **OR** acetaminophen (TYLENOL) suppository 650 mg, 650 mg, Rectal, Q4H PRN, Joel Beebe MD    carbidopa-levodopa (SINEMET)  MG per tablet 1 tablet, 1 tablet, Oral, BID, Joel Beebe MD, 1 tablet at 07/09/25 1023    cholecalciferol (VITAMIN D3) tablet 5,000 Units, 5,000 Units, Oral, Daily, Joel Beebe MD, 5,000 Units at 07/09/25 1022    ferrous sulfate tablet 325 mg, 325 mg, Oral, Every Other Day, Ernesto Gilmore Jr., MD, 325 mg at 07/09/25 1023    folic acid (FOLVITE) tablet 1 mg, 1 mg, Oral, Daily, Alexys Castano MD PhD, 1 mg at 07/09/25 1023    furosemide (LASIX) injection 40 mg, 40 mg, Intravenous, Once, Bar Lang MD    hydrocortisone-bacitracin-zinc oxide-nystatin (MAGIC BARRIER) ointment 1 Application, 1 Application, Topical, BID, Trace Erickson MD, 1 Application at 07/09/25 1023    levothyroxine (SYNTHROID, LEVOTHROID) tablet 75 mcg, 75 mcg, Oral, Q AM, Joel Beebe MD, 75 mcg at 07/09/25 0617    metoprolol tartrate (LOPRESSOR) tablet 12.5 mg, 12.5 mg, Oral, BID, David Sandoval MD, 12.5 mg at 07/09/25 1023    nitroglycerin (NITROSTAT) SL tablet 0.4 mg, 0.4 mg, Sublingual, Q5 Min PRN, Joel Beebe MD    ondansetron ODT " (ZOFRAN-ODT) disintegrating tablet 4 mg, 4 mg, Oral, Q6H PRN **OR** ondansetron (ZOFRAN) injection 4 mg, 4 mg, Intravenous, Q6H PRN, Joel Beebe MD    [COMPLETED] Insert Peripheral IV, , , Once **AND** sodium chloride 0.9 % flush 10 mL, 10 mL, Intravenous, PRN, Joel Beebe MD    sodium chloride 0.9 % flush 10 mL, 10 mL, Intravenous, Q12H, Joel Beebe MD, 10 mL at 07/08/25 2144    sodium chloride 0.9 % flush 10 mL, 10 mL, Intravenous, PRN, Joel Beebe MD    sodium chloride 0.9 % infusion 40 mL, 40 mL, Intravenous, PRN, Joel Beebe MD      Assessment & Plan       E. coli UTI (urinary tract infection)    Hypovolemic shock    Acute kidney injury    Left ureteral calculus    Severe sepsis with thrombocytopenia    Folate deficiency    Splenomegaly, congestive, chronic    Cirrhosis of liver with ascites    Iron deficiency anemia      Assessment & Plan  FAMILIA, improving  CKD, stage IIIa (baseline around 1.2)  Obstructive uropathy s/p stent placement.   Thrombocytopenia   Metabolic acidosis   Hypernatremia, Stable      Plan:  Renal function continues to slowly improve, creatinine today is near baseline  She is looking volume overloaded on exam  We will give Lasix IV x 1, await lower extremity Dopplers  Okay to replace electrolytes per protocol, as needed.  Encourage nutrition  Will follow.  Discussed with family at bedside  Discharge planning underway      Bar Lang MD  [facility]  07/09/25  15:46 EDT

## 2025-07-09 NOTE — PROGRESS NOTES
Palliative Care Progress Note  7/9/2025        Interval History: It sounds like referrals for rehab facilities have been placed. There is a note from [facility] about possible bed availability and discharge tomorrow 7/10/2025 which is what family desires for skilled rehab therapy. Otherwise no changes. Updated Son at bedside as to what I knew. Ms. Krishna is sitting up in bed and appears like she is feeling well without any complaints.     Abram Ornelas MD

## 2025-07-09 NOTE — PLAN OF CARE
Problem: Adult Inpatient Plan of Care  Goal: Plan of Care Review  Outcome: Progressing  Flowsheets (Taken 7/9/2025 1846)  Progress: improving  Outcome Evaluation: BLE edema, BLE dopplers negative for acute DVT, LUE wrapped w/ ACE wrap per Dr. Erickson. Denies pain. Alert and oriented, NSR.  Plan of Care Reviewed With:   patient   child  Goal: Patient-Specific Goal (Individualized)  Outcome: Progressing  Goal: Absence of Hospital-Acquired Illness or Injury  Outcome: Progressing  Intervention: Identify and Manage Fall Risk  Recent Flowsheet Documentation  Taken 7/9/2025 1800 by Phyllis Sahni RN  Safety Promotion/Fall Prevention:   fall prevention program maintained   safety round/check completed  Taken 7/9/2025 1630 by Phyllis Sahni RN  Safety Promotion/Fall Prevention:   fall prevention program maintained   safety round/check completed  Taken 7/9/2025 1430 by Phyllis Sahni RN  Safety Promotion/Fall Prevention:   fall prevention program maintained   safety round/check completed  Taken 7/9/2025 1200 by Phyllis Sahni RN  Safety Promotion/Fall Prevention:   fall prevention program maintained   safety round/check completed  Taken 7/9/2025 1027 by Phyllis Sahni RN  Safety Promotion/Fall Prevention:   fall prevention program maintained   safety round/check completed  Taken 7/9/2025 0800 by Phyllis Sahni RN  Safety Promotion/Fall Prevention:   fall prevention program maintained   safety round/check completed  Goal: Optimal Comfort and Wellbeing  Outcome: Progressing  Goal: Readiness for Transition of Care  Outcome: Progressing     Problem: Sepsis/Septic Shock  Goal: Optimal Coping  Outcome: Progressing  Goal: Absence of Bleeding  Outcome: Progressing  Goal: Blood Glucose Level Within Target Range  Outcome: Progressing  Goal: Absence of Infection Signs and Symptoms  Outcome: Progressing  Goal: Optimal Nutrition Delivery  Outcome: Progressing     Problem: Comorbidity Management  Goal: Blood Pressure  in Desired Range  Outcome: Progressing     Problem: Fall Injury Risk  Goal: Absence of Fall and Fall-Related Injury  Outcome: Progressing  Intervention: Promote Injury-Free Environment  Recent Flowsheet Documentation  Taken 7/9/2025 1800 by Phyllis Sahni RN  Safety Promotion/Fall Prevention:   fall prevention program maintained   safety round/check completed  Taken 7/9/2025 1630 by Phyllis Sahni RN  Safety Promotion/Fall Prevention:   fall prevention program maintained   safety round/check completed  Taken 7/9/2025 1430 by Phyllis Sahni RN  Safety Promotion/Fall Prevention:   fall prevention program maintained   safety round/check completed  Taken 7/9/2025 1200 by Phyllis Sahni RN  Safety Promotion/Fall Prevention:   fall prevention program maintained   safety round/check completed  Taken 7/9/2025 1027 by Phyllis Sahni RN  Safety Promotion/Fall Prevention:   fall prevention program maintained   safety round/check completed  Taken 7/9/2025 0800 by Phyllis Sahni RN  Safety Promotion/Fall Prevention:   fall prevention program maintained   safety round/check completed     Problem: Skin Injury Risk Increased  Goal: Skin Health and Integrity  Outcome: Progressing  Intervention: Optimize Skin Protection  Recent Flowsheet Documentation  Taken 7/9/2025 1027 by Phlylis Sahni RN  Pressure Reduction Techniques:   frequent weight shift encouraged   weight shift assistance provided  Pressure Reduction Devices: alternating pressure pump (EDIL)     Problem: UTI (Urinary Tract Infection)  Goal: Improved Infection Symptoms  Outcome: Progressing   Goal Outcome Evaluation:  Plan of Care Reviewed With: patient, child        Progress: improving  Outcome Evaluation: BLE edema, BLE dopplers negative for acute DVT, LUE wrapped w/ ACE wrap per Dr. Erickson. Denies pain. Alert and oriented, NSR.

## 2025-07-09 NOTE — PROGRESS NOTES
Name: Agueda Krishna ADMIT: 2025   : 10/20/1925  PCP: Rogers BetyJOSESITO lares    MRN: [id number] LOS: 8 days   AGE/SEX: 89 y.o. female  ROOM: [facility]     Subjective   Subjective     Sitting up in chair. Appears to be in good spirits today. Tolerating PO as well.      Objective   Objective   Vital Signs  Temp:  [97.3 °F (36.3 °C)-97.9 °F (36.6 °C)] 97.3 °F (36.3 °C)  Heart Rate:  [51-66] 57  Resp:  [16-18] 18  BP: (130-148)/(53-60) 136/60  SpO2:  [97 %-100 %] 99 %  on   ;   Device (Oxygen Therapy): room air  Body mass index is 25.76 kg/m².  Physical Exam  Constitutional:       General: She is not in acute distress.     Appearance: She is ill-appearing. She is not toxic-appearing.   Cardiovascular:      Rate and Rhythm: Normal rate and regular rhythm.      Heart sounds: Normal heart sounds.   Pulmonary:      Effort: Pulmonary effort is normal.      Breath sounds: Normal breath sounds.   Abdominal:      General: Bowel sounds are normal.      Palpations: Abdomen is soft.   Musculoskeletal:      Right lower leg: Edema present.      Left lower leg: Edema present.      Comments: Left forearm swelling   Skin:     Findings: Bruising present.   Neurological:      Mental Status: She is alert. Mental status is at baseline.      Comments: Facial droop  Aphasia   Vision and hearing impairments   Psychiatric:         Mood and Affect: Mood normal.         Behavior: Behavior normal.         Results Review     I reviewed the patient's new clinical results.  Results from last 7 days   Lab Units 25  0659 25  0759 25  0618   WBC 10*3/mm3 5.93 5.41 5.97 6.72   HEMOGLOBIN g/dL 8.1* 8.1* 8.2* 8.3*   PLATELETS 10*3/mm3 43* 33*  33* 30* 31*     Results from last 7 days   Lab Units 25  0659 25  0955 25  0725  0618   SODIUM mmol/L 142 144 145 146*   POTASSIUM mmol/L 4.4 3.6 3.5 3.8   CHLORIDE mmol/L 112* 112* 114* 114*   CO2 mmol/L 21.0* 22.0 23.5 26.0   BUN mg/dL 14.0 15.0 21.0  "28.0*   CREATININE mg/dL 1.15* 1.29* 1.42* 1.81*   GLUCOSE mg/dL 80 110* 117* 96   Estimated Creatinine Clearance: 27.1 mL/min (A) (by C-G formula based on SCr of 1.15 mg/dL (H)).  Results from last 7 days   Lab Units 07/09/25  0659 07/08/25  0955 07/05/25  0441 07/03/25  0556   ALBUMIN g/dL 2.2* 2.1* 2.1* 2.4*   BILIRUBIN mg/dL  --   --   --  <0.2   ALK PHOS U/L  --   --   --  86   AST (SGOT) U/L  --   --   --  31   ALT (SGPT) U/L  --   --   --  14     Results from last 7 days   Lab Units 07/09/25  0659 07/08/25  0955 07/07/25  0700 07/06/25  0618 07/05/25 0441 07/04/25  0717 07/03/25  0556   CALCIUM mg/dL 7.6* 7.3* 7.6* 7.5* 7.3*   < > 7.5*   ALBUMIN g/dL 2.2* 2.1*  --   --  2.1*  --  2.4*   PHOSPHORUS mg/dL 2.5 2.3*  --   --  3.5  --   --     < > = values in this interval not displayed.         No results found for: \"COVID19\"  No results found for: \"HGBA1C\", \"POCGLU\"      FL Video Swallow Single Contrast  VIDEO SWALLOWING EXAMINATION BY SPEECH PATHOLOGY     Clinical: Dysphasia     Reference Air Kerma: 12 mGy     Video swallowing examination performed under the direction of speech  pathology. Imaging reviewed by radiologist who concurs with the  findings.     Speech pathology summary:     VFSS completed with Gracie BELLAMY. Pt exhibited severe  oropharyngeal dysphagia characterized by decreased lingual and tongue  base strength/coordination, reduced hyolaryngeal excursion, reduced  epiglottic deflection, and reduced pharyngeal constriction with an  esophageal component. Pt took trials of thin, nectar, honey thick,  pureed, soft, and mixed. Pt had trace silent penetration before and  during the swallow inconsistently across all consistencies. With all  trials, pt had moderate diffuse pharyngeal residuals (tongue base,  valleculae, pyriforms) that caused additional trace silent  penetration/aspiration, increasing with fatigue. Pt was unable to follow  commands to dry swallow, clear throat, or cough, increasing " the risk.  Pt's mastication and bolus formation/transfer was slow for soft solids  with anterior spillage and oral residue noted which she expelled. Pt had  a suspected developing diverticulum noted in the upper esophagus. Pt  with noted slight stridor prior to the study that increased during the  study.           This report was finalized on 7/8/2025 10:11 AM by Dr. Anjel Stringer M.D  on Workstation: [id number]       Scheduled Medications  carbidopa-levodopa, 1 tablet, Oral, BID  cholecalciferol, 5,000 Units, Oral, Daily  ferrous sulfate, 325 mg, Oral, Every Other Day  folic acid, 1 mg, Oral, Daily  hydrocortisone-bacitracin-zinc oxide-nystatin, 1 Application, Topical, BID  levothyroxine, 75 mcg, Oral, Q AM  metoprolol tartrate, 12.5 mg, Oral, BID  sodium chloride, 10 mL, Intravenous, Q12H    Infusions     Diet  Diet: Regular/House; Fluid Consistency: Thin (IDDSI 0)       Assessment/Plan     Active Hospital Problems    Diagnosis  POA    **E. coli UTI (urinary tract infection) [N39.0, B96.20]  Yes    Severe sepsis with thrombocytopenia [A41.9, D69.59, R65.20]  Yes    Folate deficiency [E53.8]  Yes    Splenomegaly, congestive, chronic [D73.2]  Yes    Cirrhosis of liver with ascites [K74.60, R18.8]  Yes    Iron deficiency anemia [D50.9]  Yes    Hypovolemic shock [R57.1]  Yes    Acute kidney injury [N17.9]  Yes    Left ureteral calculus [N20.1]  Yes      Resolved Hospital Problems   No resolved problems to display.       89 y.o. female admitted with E. coli UTI (urinary tract infection).    89 year old woman who presented with weakness, vomiting, and diarrhea and was found to have septic shock due to e coli bacteremia/pyelonephritis due to obstructive nephrolithiasis as well as an FAMILIA on CKD 3b    E coli bacteremia/pyelonephritis due to obstructive nephrolithiasis causing septic shock-s/p cystoscopy with retrograde pyelogram and left stent insertion on 7/1/25. On cefazolin per ID. Abs completed    BLE edema- check  dopplers of BLE    FAMILIA on CKD 3b- resolved     Acute on chronic thrombocytopenia-chronic thrombocytopenia is likely due to cirrhosis/splenomegaly. Acute thrombocytopenia is probably related to her infection. Hematology is following. Started on Iron sulfate     Transient lethargy/metabolic encephalopathy-head CT was negative acutely. EEG did show some incidental epileptiform discharges. Neurology doesn't think that there's sufficient evidence of a seizure disorder and so no AEDs are recommended. Resolved.    Iron deficiency anemia-hgb is 8.3. no iv iron in the setting of bacteremia. Transfuse for hgb <7    Hyperlipidemia-on a statin as an outpatient. Can be restarted at discharge    Cirrhosis-based on imaging with evidence of portal hypertension    Hypothyroidism-synthroid    Parkinson's disease-sinemet    History of CVA with chronic aphasia, partial blindness, hearing loss    Dysphagia-on a regular diet at home. Her diet was modified here earlier in the hospital stay when she was more acutely ill. SLP is planning a VFSS on Monday.     SCDs for DVT prophylaxis.  Limited code (no CPR, no intubation).  Discussed with patient, family, and nursing staff.  Anticipate discharge to SNU facility tomorrow        Trace Erickson MD  [facility]  07/09/25  13:32 EDT

## 2025-07-10 VITALS
BODY MASS INDEX: 25.65 KG/M2 | HEIGHT: 66 IN | DIASTOLIC BLOOD PRESSURE: 62 MMHG | OXYGEN SATURATION: 97 % | TEMPERATURE: 97.6 F | SYSTOLIC BLOOD PRESSURE: 136 MMHG | HEART RATE: 58 BPM | WEIGHT: 159.61 LBS | RESPIRATION RATE: 16 BRPM

## 2025-07-10 LAB
ALBUMIN SERPL-MCNC: 2.3 G/DL (ref 3.5–5.2)
ANION GAP SERPL CALCULATED.3IONS-SCNC: 7.8 MMOL/L (ref 5–15)
BUN SERPL-MCNC: 15 MG/DL (ref 8–23)
BUN/CREAT SERPL: 12.7 (ref 7–25)
CALCIUM SPEC-SCNC: 7.8 MG/DL (ref 8.2–9.6)
CHLORIDE SERPL-SCNC: 111 MMOL/L (ref 98–107)
CO2 SERPL-SCNC: 25.2 MMOL/L (ref 22–29)
CREAT SERPL-MCNC: 1.18 MG/DL (ref 0.57–1)
DEPRECATED RDW RBC AUTO: 44.6 FL (ref 37–54)
EGFRCR SERPLBLD CKD-EPI 2021: 41.6 ML/MIN/1.73
ERYTHROCYTE [DISTWIDTH] IN BLOOD BY AUTOMATED COUNT: 14.4 % (ref 12.3–15.4)
GLUCOSE SERPL-MCNC: 83 MG/DL (ref 65–99)
HCT VFR BLD AUTO: 27.2 % (ref 34–46.6)
HGB BLD-MCNC: 8.3 G/DL (ref 12–15.9)
MCH RBC QN AUTO: 26.4 PG (ref 26.6–33)
MCHC RBC AUTO-ENTMCNC: 30.5 G/DL (ref 31.5–35.7)
MCV RBC AUTO: 86.6 FL (ref 79–97)
PHOSPHATE SERPL-MCNC: 2.4 MG/DL (ref 2.5–4.5)
PLATELET # BLD AUTO: 48 10*3/MM3 (ref 140–450)
POTASSIUM SERPL-SCNC: 3.5 MMOL/L (ref 3.5–5.2)
RBC # BLD AUTO: 3.14 10*6/MM3 (ref 3.77–5.28)
SODIUM SERPL-SCNC: 144 MMOL/L (ref 136–145)
WBC NRBC COR # BLD AUTO: 6.67 10*3/MM3 (ref 3.4–10.8)

## 2025-07-10 PROCEDURE — 25010000002 FUROSEMIDE PER 20 MG: Performed by: STUDENT IN AN ORGANIZED HEALTH CARE EDUCATION/TRAINING PROGRAM

## 2025-07-10 PROCEDURE — 97530 THERAPEUTIC ACTIVITIES: CPT | Performed by: PHYSICAL THERAPIST

## 2025-07-10 PROCEDURE — 85027 COMPLETE CBC AUTOMATED: CPT | Performed by: INTERNAL MEDICINE

## 2025-07-10 PROCEDURE — 80069 RENAL FUNCTION PANEL: CPT | Performed by: INTERNAL MEDICINE

## 2025-07-10 PROCEDURE — 99232 SBSQ HOSP IP/OBS MODERATE 35: CPT | Performed by: INTERNAL MEDICINE

## 2025-07-10 RX ORDER — FOLIC ACID 1 MG/1
1 TABLET ORAL DAILY
Qty: 30 TABLET | Refills: 0 | Status: SHIPPED | OUTPATIENT
Start: 2025-07-11

## 2025-07-10 RX ORDER — FERROUS SULFATE 325(65) MG
325 TABLET ORAL EVERY OTHER DAY
Qty: 30 TABLET | Refills: 0 | Status: SHIPPED | OUTPATIENT
Start: 2025-07-11

## 2025-07-10 RX ORDER — FUROSEMIDE 10 MG/ML
40 INJECTION INTRAMUSCULAR; INTRAVENOUS ONCE
Status: COMPLETED | OUTPATIENT
Start: 2025-07-10 | End: 2025-07-10

## 2025-07-10 RX ADMIN — LEVOTHYROXINE SODIUM 75 MCG: 0.07 TABLET ORAL at 06:29

## 2025-07-10 RX ADMIN — FOLIC ACID 1 MG: 1 TABLET ORAL at 09:09

## 2025-07-10 RX ADMIN — METOPROLOL TARTRATE 12.5 MG: 25 TABLET, FILM COATED ORAL at 09:09

## 2025-07-10 RX ADMIN — Medication 5000 UNITS: at 09:09

## 2025-07-10 RX ADMIN — Medication 10 ML: at 09:09

## 2025-07-10 RX ADMIN — CARBIDOPA AND LEVODOPA 1 TABLET: 25; 100 TABLET ORAL at 09:09

## 2025-07-10 RX ADMIN — ZINC OXIDE 1 APPLICATION: 200 OINTMENT TOPICAL at 09:10

## 2025-07-10 RX ADMIN — FUROSEMIDE 40 MG: 10 INJECTION, SOLUTION INTRAMUSCULAR; INTRAVENOUS at 12:11

## 2025-07-10 NOTE — PROGRESS NOTES
[facility] INPATIENT PROGRESS NOTE    Length of Stay:  9 days    CHIEF COMPLAINT:  Septic shock with pyelonephritis, E. coli bacteremia, obstructive nephrolithiasis, FAMILIA/CKD3a, acute on chronic thrombocytopenia, anemia, cirrhosis, Parkinson's disease, history of stroke    SUBJECTIVE:   Patient awake and alert, attempting to communicate    ROS:  Review of Systems   Unable to obtain    OBJECTIVE:  Vitals:    07/09/25 1208 07/09/25 1900 07/09/25 2328 07/10/25 0722   BP: 136/60 151/59 140/56 137/66   BP Location: Right arm      Patient Position: Sitting      Pulse: 57 66 55 54   Resp: 18 16 16 16   Temp: 97.3 °F (36.3 °C) 97.5 °F (36.4 °C) 97.9 °F (36.6 °C) 97.7 °F (36.5 °C)   TempSrc: Oral Oral Oral Oral   SpO2: 99%  97% 97%   Weight:       Height:             PHYSICAL EXAMINATION:  General: Awake and alert, no distress  Chest/Lungs: Clear to auscultation bilaterally anteriorly  Heart: Regular rate and rhythm  Abdomen/GI: Soft nontender nondistended bowel sounds present  Extremities: Trace-1+ bilateral lower extremity edema.  Left upper extremity is wrapped        DIAGNOSTIC DATA:  Results Review:     I reviewed the patient's new clinical results.    Results from last 7 days   Lab Units 07/10/25  0553 07/09/25  0659 07/08/25  0759   WBC 10*3/mm3 6.67 5.93 5.41   HEMOGLOBIN g/dL 8.3* 8.1* 8.1*   HEMATOCRIT % 27.2* 25.0* 25.9*   PLATELETS 10*3/mm3 48* 43* 33*  33*      Results from last 7 days   Lab Units 07/10/25  0553 07/09/25  0659 07/08/25  0955   SODIUM mmol/L 144 142 144   POTASSIUM mmol/L 3.5 4.4 3.6   CHLORIDE mmol/L 111* 112* 112*   CO2 mmol/L 25.2 21.0* 22.0   BUN mg/dL 15.0 14.0 15.0   CREATININE mg/dL 1.18* 1.15* 1.29*   CALCIUM mg/dL 7.8* 7.6* 7.3*   GLUCOSE mg/dL 83 80 110*      Lab Results   Component Value Date    NEUTROABS 11.18 (H) 07/04/2025     Results from last 7 days   Lab Units 07/06/25  0618   INR  1.20*                 Assessment & Plan   ASSESSMENT/PLAN:  This is a 89 y.o. female  with:     *Septic shock with pyelonephritis, E. coli bacteremia, obstructive nephrolithiasis  CT abdomen pelvis 7/1/2025 with 2.1 cm stone left renal pelvis with left pelviectasis and perinephric fat stranding suggesting obstruction and UTI/pyelonephritis, bladder wall thickening and perivesical fat stranding possibly secondary to cystitis.  Blood cultures 7/1/2025 positive for E. Coli  Patient completed course of cefazolin    *FAMILIA/CKD3a  Baseline creatinine around 1.2  On admission creatinine 2.88 with increased to 3.13 on 7/3/2025  Obstructive uropathy status post left ureteral stent placement on 7/1/2025  Creatinine today able of 1.18    *Acute on chronic thrombocytopenia  Patient with chronic thrombocytopenia dating back to at least 2015 with platelet count in the low to mid 100,000 range  Evidence of underlying cirrhosis on imaging studies which may be contributing factor to chronic thrombocytopenia  Platelet count prior to admission on 6/4/25 was 84,000  Platelet count on admission is 7/1/2025 was 36,000, decline to 22,000 following admission.  IPF elevated at 13.5%  Additional labs on 7/2/2025 with B12 842, folate low at 4.24, LDH borderline elevated at 231, IPF elevated at 15.1%  Acute on chronic thrombocytopenia felt to likely be consumptive in nature related to current infection/sepsis  Platelet count today improved at 48,000.  Platelet count now steadily improving after resolution of sepsis.  She does have underlying cirrhosis and likely will not achieve a normal platelet count in the future.  Certainly there could be other issues involving bone marrow such as MDS however we would not pursue any further evaluation given her age and comorbidities.  She is not experiencing any bleeding issues.  With ongoing improvement of her count, would recommend that we not pursue further outpatient follow-up for her hematologic issues.    *Anemia  Patient with history of anemia, labs prior to admission on 6/9/2025 with  hemoglobin 10.5, MCV 88.6, iron 18, ferritin 40, iron saturation 5%, TIBC 369 consistent with iron deficiency anemia  On admission 7/1/2025 hemoglobin was 9.2  Additional labs on 7/1/2025 with iron 14, ferritin 154, iron saturation 4%, TIBC 373, B12 842, folate low at 4.24  Stool for occult blood positive on 7/2/2025.  Patient is not a candidate for endoscopic evaluation  Patient is receiving oral folic acid 1 mg daily for folate deficiency  Hemoglobin did decline down to 7.5 on 7/4/25  With low iron saturation at 4%, some suspicion for concurrent iron deficiency, particularly in light of stool positive for occult blood.    On 7/8/2025 initiated oral iron sulfate every other day  Hemoglobin today stable at 8.3.  Would recommend for patient to continue on oral iron every other day as long as this is tolerated from a GI standpoint.  Would expect anemia to gradually improve over time.    *Cirrhosis  CT abdomen pelvis on 7/1/2025 with heterogeneous hepatic attenuation with subtle nodular liver contours possibly reflecting underlying hepatocellular disease/cirrhosis with evidence of portal venous hypertension including splenomegaly (14.5 cm), recanalization of periumbilical vein, trace ascites.  Viral hepatitis A, B, C panel negative on 7/2/2025  Possibly contributing factors of the patient's chronic mild thrombocytopenia    *Parkinson's disease  Carbidopa/levodopa 25/100 twice daily    *History of stroke    *Chronic left calf DVT  Doppler on 7/9/2025 with chronic left lower extremity calf DVT    *Disposition  Patient's family has made decision not to pursue placement of feeding tube in relation to swallowing issues  Patient planning discharge to subacute nursing facility possibly tomorrow      Plan:  Platelet count gradually improving following septic episode  Anticipate patient will have persistent mild chronic thrombocytopenia related to underlying cirrhosis  Continue oral iron sulfate every other day  Anticipate  gradual improvement in hemoglobin  Patient likely being discharged to subacute nursing facility today.  We will sign off and will not arrange any future outpatient follow-up.  Please call with further questions.         Ernesto Gilmore MD

## 2025-07-10 NOTE — PROGRESS NOTES
Name: Agueda Krishna ADMIT: 2025   : 10/20/1925  PCP: Rogers BetyJOSESITO lares    MRN: [id number] LOS: 9 days   AGE/SEX: 89 y.o. female  ROOM: [facility]     Subjective   Subjective   Venous dopplers showed a chronic DVT in the LLE.      Objective   Objective   Vital Signs  Temp:  [97.5 °F (36.4 °C)-97.9 °F (36.6 °C)] 97.6 °F (36.4 °C)  Heart Rate:  [54-66] 58  Resp:  [16] 16  BP: (136-151)/(56-66) 136/62  SpO2:  [97 %] 97 %  on   ;   Device (Oxygen Therapy): room air  Body mass index is 25.76 kg/m².  Physical Exam  Constitutional:       General: She is not in acute distress.     Appearance: She is ill-appearing. She is not toxic-appearing.   Cardiovascular:      Rate and Rhythm: Normal rate and regular rhythm.      Heart sounds: Normal heart sounds.   Pulmonary:      Effort: Pulmonary effort is normal.      Breath sounds: Normal breath sounds.   Abdominal:      General: Bowel sounds are normal.      Palpations: Abdomen is soft.   Musculoskeletal:      Right lower leg: Edema present.      Left lower leg: Edema present.      Comments: Left forearm swelling   Skin:     Findings: Bruising present.   Neurological:      Mental Status: She is alert. Mental status is at baseline.      Comments: Facial droop  Aphasia   Vision and hearing impairments   Psychiatric:         Mood and Affect: Mood normal.         Behavior: Behavior normal.         Results Review     I reviewed the patient's new clinical results.  Results from last 7 days   Lab Units 07/10/25  0553 25  0659 25  0759 25  0700   WBC 10*3/mm3 6.67 5.93 5.41 5.97   HEMOGLOBIN g/dL 8.3* 8.1* 8.1* 8.2*   PLATELETS 10*3/mm3 48* 43* 33*  33* 30*     Results from last 7 days   Lab Units 07/10/25  0553 25  0659 25  0955 25  0700   SODIUM mmol/L 144 142 144 145   POTASSIUM mmol/L 3.5 4.4 3.6 3.5   CHLORIDE mmol/L 111* 112* 112* 114*   CO2 mmol/L 25.2 21.0* 22.0 23.5   BUN mg/dL 15.0 14.0 15.0 21.0   CREATININE mg/dL 1.18* 1.15* 1.29*  "1.42*   GLUCOSE mg/dL 83 80 110* 117*   Estimated Creatinine Clearance: 26.5 mL/min (A) (by C-G formula based on SCr of 1.18 mg/dL (H)).  Results from last 7 days   Lab Units 07/10/25  0553 07/09/25  0659 07/08/25  0955 07/05/25  0441   ALBUMIN g/dL 2.3* 2.2* 2.1* 2.1*     Results from last 7 days   Lab Units 07/10/25  0553 07/09/25  0659 07/08/25  0955 07/07/25  0700 07/06/25  0618 07/05/25  0441   CALCIUM mg/dL 7.8* 7.6* 7.3* 7.6*   < > 7.3*   ALBUMIN g/dL 2.3* 2.2* 2.1*  --   --  2.1*   PHOSPHORUS mg/dL 2.4* 2.5 2.3*  --   --  3.5    < > = values in this interval not displayed.         No results found for: \"COVID19\"  No results found for: \"HGBA1C\", \"POCGLU\"      Duplex Venous Lower Extremity - Bilateral CAR    Chronic left lower extremity deep vein thrombosis noted in the   gastrocnemius.    All other veins appeared normal bilaterally.    Scheduled Medications  carbidopa-levodopa, 1 tablet, Oral, BID  cholecalciferol, 5,000 Units, Oral, Daily  ferrous sulfate, 325 mg, Oral, Every Other Day  folic acid, 1 mg, Oral, Daily  hydrocortisone-bacitracin-zinc oxide-nystatin, 1 Application, Topical, BID  levothyroxine, 75 mcg, Oral, Q AM  metoprolol tartrate, 12.5 mg, Oral, BID  sodium chloride, 10 mL, Intravenous, Q12H    Infusions     Diet  Diet: Regular/House; Fluid Consistency: Thin (IDDSI 0)       Assessment/Plan     Active Hospital Problems    Diagnosis  POA    **E. coli UTI (urinary tract infection) [N39.0, B96.20]  Yes    Severe sepsis with thrombocytopenia [A41.9, D69.59, R65.20]  Yes    Folate deficiency [E53.8]  Yes    Splenomegaly, congestive, chronic [D73.2]  Yes    Cirrhosis of liver with ascites [K74.60, R18.8]  Yes    Iron deficiency anemia [D50.9]  Yes    Hypovolemic shock [R57.1]  Yes    Acute kidney injury [N17.9]  Yes    Left ureteral calculus [N20.1]  Yes      Resolved Hospital Problems   No resolved problems to display.       89 y.o. female admitted with E. coli UTI (urinary tract infection).    89 " year old woman who presented with weakness, vomiting, and diarrhea and was found to have septic shock due to e coli bacteremia/pyelonephritis due to obstructive nephrolithiasis as well as an FAMILIA on CKD 3b      E coli bacteremia/pyelonephritis due to obstructive nephrolithiasis causing septic shock-s/p cystoscopy with retrograde pyelogram and left stent insertion on 7/1/25. On cefazolin per ID. Abs completed    BLE edema- check dopplers of BLE- chronic DVT in LLE. No AC due to unsteady gait. Will administer one dose of IV lasix and monitor response.    FAMILIA on CKD 3b- resolved     Acute on chronic thrombocytopenia-chronic thrombocytopenia is likely due to cirrhosis/splenomegaly. Acute thrombocytopenia is probably related to her infection. Hematology is following. Started on Iron sulfate     Transient lethargy/metabolic encephalopathy-head CT was negative acutely. EEG did show some incidental epileptiform discharges. Neurology doesn't think that there's sufficient evidence of a seizure disorder and so no AEDs are recommended. Resolved.    Iron deficiency anemia-hgb is 8.3. no iv iron in the setting of bacteremia. Transfuse for hgb <7    Hyperlipidemia-on a statin as an outpatient. Can be restarted at discharge    Cirrhosis-based on imaging with evidence of portal hypertension    Hypothyroidism-synthroid    Parkinson's disease-sinemet    History of CVA with chronic aphasia, partial blindness, hearing loss    Dysphagia-on a regular diet at home. Her diet was modified here earlier in the hospital stay when she was more acutely ill. Discussed with family, they would like her to continue a regular diet. This was discussed with palliative care as well. They understand the risks of aspiration, sepsis, and possibly death.     SCDs for DVT prophylaxis.  Limited code (no CPR, no intubation).  Discussed with patient, family, and nursing staff.  Anticipate discharge to SNU facility when arrangements have been made          Trace  MD Georgina  [facility]  07/10/25  13:27 EDT

## 2025-07-10 NOTE — THERAPY TREATMENT NOTE
Patient Name: Agueda Krishna  : 10/20/1925    MRN: [id number]                              Today's Date: 7/10/2025       Admit Date: 2025    Visit Dx:     ICD-10-CM ICD-9-CM   1. Left ureteral calculus  N20.1 592.1   2. Hypovolemic shock  R57.1 785.59   3. Vomiting and diarrhea  R11.10 787.03    R19.7 787.91   4. Anemia, unspecified type  D64.9 285.9   5. Thrombocytopenia  D69.6 287.5   6. Leukocytosis, unspecified type  D72.829 288.60   7. Lactic acidosis  E87.20 276.2   8. Acute kidney injury  N17.9 584.9     Patient Active Problem List   Diagnosis    Hypovolemic shock    Acute kidney injury    Left ureteral calculus    Severe sepsis with thrombocytopenia    Folate deficiency    Splenomegaly, congestive, chronic    Cirrhosis of liver with ascites    Iron deficiency anemia    E. coli UTI (urinary tract infection)     Past Medical History:   Diagnosis Date    History of stroke 2009    Hyperlipidemia     Hypertension     Hypothyroid      Past Surgical History:   Procedure Laterality Date    CATARACT EXTRACTION Bilateral     CYSTOSCOPY, RETROGRADE PYELOGRAM AND STENT INSERTION Left 2025    Procedure: CYSTOSCOPY, RETROGRADE PYELOGRAM, AND LEFT STENT INSERTION;  Surgeon: Joel Beebe MD;  Location: [facility];  Service: Urology;  Laterality: Left;    HYSTERECTOMY      REPLACEMENT TOTAL KNEE Left     TOTAL HIP ARTHROPLASTY Right       General Information       Row Name 07/10/25 1144          Physical Therapy Time and Intention    Document Type therapy note (daily note)  -SV     Mode of Treatment individual therapy;physical therapy  -SV               User Key  (r) = Recorded By, (t) = Taken By, (c) = Cosigned By      Initials Name Provider Type    SV Odessa Gilmore, PT Physical Therapist                   Mobility       Row Name 07/10/25 1301 07/10/25 1221       Bed Mobility    Supine-Sit [place] (Bed Mobility) not tested  -SV --    Sit-Supine [place] (Bed Mobility) minimum assist (75%  patient effort);nonverbal cues (demo/gesture)  -SV minimum assist (75% patient effort);verbal cues;nonverbal cues (demo/gesture);moderate assist (50% patient effort)  -SV      Row Name 07/10/25 1301 07/10/25 1221       Sit-Stand Transfer    Sit-Stand [place] (Transfers) minimum assist (75% patient effort)  -SV minimum assist (75% patient effort)  -SV    Assistive Device (Sit-Stand Transfers) walker, front-wheeled  -SV walker, front-wheeled  -SV      Row Name 07/10/25 1301 07/10/25 1221       Gait/Stairs (Locomotion)    [place] Level (Gait) contact guard  -SV contact guard  -SV    Assistive Device (Gait) walker, front-wheeled  -SV walker, front-wheeled  -SV    Distance in Feet (Gait) -- 25  -SV    Deviations/Abnormal Patterns (Gait) -- gait speed decreased;stride length decreased;festinating/shuffling  -SV    Bilateral Gait Deviations -- forward flexed posture;heel strike decreased  -SV              User Key  (r) = Recorded By, (t) = Taken By, (c) = Cosigned By      Initials Name Provider Type    SV Odessa Gilmore, PT Physical Therapist                   Obj/Interventions       Row Name 07/10/25 1221          Motor Skills    Therapeutic Exercise --  ankle PF stretching matty ankles 45 h x2, aarom ankle circles cw and ccw  -SV       Row Name 07/10/25 1221          Balance    Balance Assessment sitting static balance;standing static balance  -SV     Static Sitting Balance standby assist  -SV     Static Standing Balance contact guard  -SV     Position/Device Used, Standing Balance walker, rolling  -SV     Comment, Balance Pt stood for 2-3 minutes with rwx for hygiene  -SV               User Key  (r) = Recorded By, (t) = Taken By, (c) = Cosigned By      Initials Name Provider Type    SV Odessa Gilmore, PT Physical Therapist                   Goals/Plan    No documentation.                  Clinical Impression    No documentation.                  Outcome Measures       Row Name 07/10/25 1302          How much  help from another person do you currently need...    Turning from your back to your side while in flat bed without using bedrails? 4  -SV     Moving from lying on back to sitting on the side of a flat bed without bedrails? 3  -SV     Moving to and from a bed to a chair (including a wheelchair)? 3  -SV     Standing up from a chair using your arms (e.g., wheelchair, bedside chair)? 3  -SV     Climbing 3-5 steps with a railing? 1  -SV     To walk in hospital room? 3  -SV     AM-PAC 6 Clicks Score (PT) 17  -SV     Highest Level of Mobility Goal Stand (1 or More Minutes)-5  -SV               User Key  (r) = Recorded By, (t) = Taken By, (c) = Cosigned By      Initials Name Provider Type    SV Odessa Gilmore, PT Physical Therapist                                 Physical Therapy Education       Title: PT OT SLP Therapies (In Progress)       Topic: Physical Therapy (Done)       Point: Mobility training (Done)       Learning Progress Summary            Patient Acceptance, E, VU,DU,NR by  at 7/9/2025 1233    Acceptance, E,D, DU by  at 7/6/2025 1727   Family Acceptance, E,D, DU by  at 7/6/2025 1727                      Point: Home exercise program (Done)       Learning Progress Summary            Patient Acceptance, E, VU,DU,NR by  at 7/9/2025 1233    Acceptance, E,D, DU by  at 7/6/2025 1727   Family Acceptance, E,D, DU by  at 7/6/2025 1727                      Point: Body mechanics (Done)       Learning Progress Summary            Patient Acceptance, E, VU,DU,NR by  at 7/9/2025 1233    Acceptance, E,D, DU by  at 7/6/2025 1727   Family Acceptance, E,D, DU by  at 7/6/2025 1727                      Point: Precautions (Done)       Learning Progress Summary            Patient Acceptance, E, VU,DU,NR by  at 7/9/2025 1233    Acceptance, E,D, DU by  at 7/6/2025 1727   Family Acceptance, E,D, DU by  at 7/6/2025 1727                                      User Key       Initials Effective Dates Name Provider  Type Discipline     03/07/18 -  Phyllis Mccloud PTA Physical Therapist Assistant PT     09/11/24 -  Biju Harris PT Physical Therapist PT                  PT Recommendation and Plan           Time Calculation:         PT Charges       Row Name 07/10/25 1304             Time Calculation    Start Time 1144  -SV      Stop Time 1207  -SV      Time Calculation (min) 23 min  -SV      PT Received On 07/10/25  -SV      PT - Next Appointment 07/11/25  -                User Key  (r) = Recorded By, (t) = Taken By, (c) = Cosigned By      Initials Name Provider Type    SV Odessa Gilmore, PT Physical Therapist                  Therapy Charges for Today       Code Description Service Date Service Provider Modifiers Qty    67642557282 HC PT THERAPEUTIC ACT EA 15 MIN 7/10/2025 Odessa Gilmore, PT GP 2            PT G-Codes  Outcome Measure Options: AM-PAC 6 Clicks Basic Mobility (PT)  AM-PAC 6 Clicks Score (PT): 17  AM-PAC 6 Clicks Score (OT): 13  Modified [place] Scale: 4 - Moderately severe disability.  Unable to walk without assistance, and unable to attend to own bodily needs without assistance.       Odessa Gilmore, ERIK  7/10/2025

## 2025-07-10 NOTE — PLAN OF CARE
Goal Outcome Evaluation:      Pt uic prior to PT with BM in her brief. CNA present for hygiene. STS to rwx with min asst. She stood for 2-3 minutes with cga for hygiene and then amb to room door with cga. Flexed posture, shuffle pattern. She declined further gait due to fatigue. Pt needed at least Min asst for sit to supine due to  unable to elevate BLE into bed today. Fall risk. Pt awaiting snf rehab.

## 2025-07-10 NOTE — DISCHARGE SUMMARY
Patient Name: Agueda Krishna  : 10/20/1925  MRN: [id number]    Date of Admission: 2025  Date of Discharge:  7/10/2025  Primary Care Physician: Bety Mccloud APRN      Chief Complaint:   Vomiting      Discharge Diagnoses     Active Hospital Problems    Diagnosis  POA    **E. coli UTI (urinary tract infection) [N39.0, B96.20]  Yes    Severe sepsis with thrombocytopenia [A41.9, D69.59, R65.20]  Yes    Folate deficiency [E53.8]  Yes    Splenomegaly, congestive, chronic [D73.2]  Yes    Cirrhosis of liver with ascites [K74.60, R18.8]  Yes    Iron deficiency anemia [D50.9]  Yes    Hypovolemic shock [R57.1]  Yes    Acute kidney injury [N17.9]  Yes    Left ureteral calculus [N20.1]  Yes      Resolved Hospital Problems   No resolved problems to display.        Hospital Course       89 year old woman with a history of CVA and chronic aphasia, partial blindness, hearing loss who presented with weakness, vomiting, and diarrhea and was found to have septic shock due to e coli bacteremia/pyelonephritis due to obstructive nephrolithiasis as well as an FAMILIA on CKD 3b. She underwent a cystoscopy with retrograde pyelogram and left stent insertion on 25 and completed a course of cefazolin per ID recommendations while hospitalized. Her course was complicated by leukocytopenia, acute kidney injury, and dysphagia.  Regarding thrombocytopenia, hematology was consulted and it was thought that the thrombocytopenia was related to her infection and she was started on iron sulfate.  Regarding the FAMILIA, renal function improved with IV fluids.  Regarding the dysphagia, she underwent a swallow evaluation that was concerning for silent aspiration throughout all consistencies.  A modified diet was recommended currently on cefazolin per ID. FAMILIA is improving with nephrology following.  NPO was recommended by speech therapy however family was against this.  I had a conversation with the patient's son who stated that they they understood the  risk of maintaining a regular diet despite evidence of aspiration, including aspiration pneumonia, sepsis.   Finally, she did have some lower extremity edema.  She underwent a venous Doppler of lower extremities that showed a chronic left lower extremity DVT.  Due to her hearing issues, partial blindness, gait instability, generalized weakness, a  joint decision was made with family that was that anticoagulation should not be pursued in any form.  She was given a dose of IV Lasix.  She is medically stable for discharge to a skilled nursing facility.    Physical Exam:  Temp:  [97.5 °F (36.4 °C)-97.9 °F (36.6 °C)] 97.6 °F (36.4 °C)  Heart Rate:  [54-66] 58  Resp:  [16] 16  BP: (136-151)/(56-66) 136/62  Body mass index is 25.76 kg/m².  Physical Exam  Constitutional:       General: She is not in acute distress.     Comments: Chronically ill appearing   HENT:      Head: Normocephalic and atraumatic.   Cardiovascular:      Rate and Rhythm: Normal rate and regular rhythm.   Abdominal:      General: Abdomen is flat. There is no distension.   Musculoskeletal:      Right lower leg: Edema present.      Left lower leg: Edema present.   Neurological:      Mental Status: She is alert. Mental status is at baseline.      Motor: Weakness present.         Consultants     Consult Orders (all) (From admission, onward)       Start     Ordered    07/08/25 1335  Inpatient Palliative Care Team Consult  Once        Provider:  (Not yet assigned)    07/08/25 1334    07/04/25 1549  Inpatient Neurology Consult General  Once        Specialty:  Neurology  Provider:  Michael Mesa MD    07/04/25 1548    07/03/25 1427  Inpatient Nephrology Consult  Once        Specialty:  Nephrology  Provider:  Bar Lang MD    07/03/25 1426    07/03/25 1349  Inpatient Nephrology Consult  Once,   Status:  Canceled        Specialty:  Nephrology  Provider:  Forest Tesfaye MD    07/03/25 1349    07/02/25 0959  Inpatient Infectious  Diseases Consult  Once        Specialty:  Infectious Diseases  Provider:  Emeka Broussard MD    07/02/25 0958    07/01/25 2115  Hematology & Oncology Inpatient Consult  Once        Specialty:  Hematology and Oncology  Provider:  Bar Vargas MD    07/01/25 2120 07/01/25 2104  Inpatient Nutrition Consult  Once        Provider:  (Not yet assigned)    07/01/25 2103 07/01/25 2041  Inpatient Case Management  Consult  Once        Provider:  (Not yet assigned)    07/01/25 2040 07/01/25 2041  Inpatient Nutrition Consult  Once        Provider:  (Not yet assigned)    07/01/25 2040 07/01/25 1842  Inpatient Spiritual Care Consult  Once        Provider:  (Not yet assigned)    07/01/25 1841 07/01/25 1408  Palliative Care Nurse Consult  Once,   Status:  Canceled        Provider:  (Not yet assigned)    07/01/25 1407    07/01/25 1407  Urology (on-call MD unless specified)  Once        Specialty:  Urology  Provider:  (Not yet assigned)    07/01/25 1406    07/01/25 1325  [facility] (on-call MD unless specified) Details  Once        Specialty:  Hospitalist  Provider:  (Not yet assigned)    07/01/25 1324                  Procedures     Imaging Results (All)       Procedure Component Value Units Date/Time    FL Video Swallow Single Contrast [[id number]] Collected: 07/08/25 0849     Updated: 07/08/25 1015    Narrative:      VIDEO SWALLOWING EXAMINATION BY SPEECH PATHOLOGY     Clinical: Dysphasia     Reference Air Kerma: 12 mGy     Video swallowing examination performed under the direction of speech  pathology. Imaging reviewed by radiologist who concurs with the  findings.     Speech pathology summary:     VFSS completed with Gracie BELLAMY. Pt exhibited severe  oropharyngeal dysphagia characterized by decreased lingual and tongue  base strength/coordination, reduced hyolaryngeal excursion, reduced  epiglottic deflection, and reduced pharyngeal constriction with an  esophageal component. Pt took  trials of thin, nectar, honey thick,  pureed, soft, and mixed. Pt had trace silent penetration before and  during the swallow inconsistently across all consistencies. With all  trials, pt had moderate diffuse pharyngeal residuals (tongue base,  valleculae, pyriforms) that caused additional trace silent  penetration/aspiration, increasing with fatigue. Pt was unable to follow  commands to dry swallow, clear throat, or cough, increasing the risk.  Pt's mastication and bolus formation/transfer was slow for soft solids  with anterior spillage and oral residue noted which she expelled. Pt had  a suspected developing diverticulum noted in the upper esophagus. Pt  with noted slight stridor prior to the study that increased during the  study.           This report was finalized on 7/8/2025 10:11 AM by Dr. Anjel Stringer M.D  on Workstation: [id number]       CT Head Without Contrast [[id number]] Collected: 07/04/25 1619     Updated: 07/04/25 1627    Narrative:      CT HEAD WO CONTRAST-     INDICATION: Altered mental status     COMPARISON: None     TECHNIQUE:  Routine CT head without IV contrast. Coronal and sagittal reformats.  Radiation dose reduction techniques were utilized, including automated  exposure control and exposure modulation based on body size.     FINDINGS:      Brain: No intraparenchymal hemorrhage. Large area of hypoattenuation and  volume loss seen within the left frontal and parietal lobes and  posterior superior temporal lobe, consistent with encephalomalacia from  prior infarct. No mass effect or midline shift. Chronic small vessel  ischemic changes.     Ventricles: Ex vacuo dilatation of the left lateral ventricle from left  hemisphere volume loss. No intraventricular hemorrhage.     Extra-axial spaces: No extra-axial hemorrhage or fluid collection seen.  Stent seen in the left M1 segment.     Orbits: Cataract extractions.     Osseous structures: No fracture. Hyperostosis frontalis interna.     Sinuses:  Patent mastoid air cells and middle ears. Patent paranasal  sinuses.       Impression:         1. No acute intracranial process.  2. Large amount of encephalomalacia in the left frontal, parietal and  temporal lobe, consistent with an old MCA territory infarct.     This report was finalized on 7/4/2025 4:24 PM by Dr. Forest Leonardo M.D  on Workstation: [id number]       FL Retrograde Pyelogram In OR [[id number]] Collected: 07/01/25 1834     Updated: 07/01/25 1839    Narrative:      Fluoroscopic retrograde pyelogram     Clinical: Ureteral calculus     FT 42 seconds   mGy 4.8   5 images     FINDINGS: Initial image demonstrates a guidewire within the left renal  collecting system. A catheter was introduced and the tip manipulated to  the pelvis. Partial retrograde filling with contrast material  demonstrates a filling defect at the UPJ. This corresponds to the stone  seen on prior CT. Double J ureteral stent was placed and the tip  manipulated into one of the upper pole calyces. Position is  satisfactory.     This report was finalized on 7/1/2025 6:36 PM by Dr. Kyle Malhotra M.D  on Workstation: [id number]       CT Abdomen Pelvis Without Contrast [[id number]] Collected: 07/01/25 1440     Updated: 07/01/25 1457    Narrative:      CT ABDOMEN PELVIS WO CONTRAST-     DATE OF EXAM: 7/1/2025 1:41 PM     INDICATION: vomiting, diarrhea, WBC 23, hypotensive.     COMPARISON: Prior renal ultrasound 9/18/2023.     TECHNIQUE: Multiple contiguous axial images were acquired through the  abdomen and pelvis without the intravenous administration of contrast.  Reformatted coronal and sagittal sequences were also reviewed. Radiation  dose reduction techniques were utilized, including automated exposure  control and exposure modulation based on body size.     FINDINGS:  Motion artifact limits evaluation.     Partially imaged small left pleural effusion and trace right pleural  fluid with multifocal bibasilar subsegmental atelectasis  and/or  scarring. Patchy dependent groundglass opacities in the base of each  lower lobe could reflect superimposed pneumonia. Bilateral lower lobe  bronchial plugging. Partially imaged severe calcified coronary artery  disease and aortic valve calcifications. Trace pericardial fluid.     Status post cholecystectomy. Mildly heterogeneous hepatic attenuation  with subtle nodular liver contours, which could reflect underlying  hepatocellular disease/cirrhosis. Recanalization of the periumbilical  vein. Mild splenomegaly with the spleen measuring 14.5 cm in diameter.  The pancreas and adrenal glands are unremarkable in limited noncontrast  CT appearance. Incompletely evaluated 1.5 cm slightly high attenuation  exophytic lesion at the upper pole of the right kidney, probable benign  hemorrhagic or proteinaceous cyst. Large 7 mm x 21 mm stone in the left  renal pelvis with mild left pelviectasis and mild left peripelvic and  perinephric fat stranding, likely intermittently obstructing. Additional  nonobstructing 3 to 4 mm stones in the left kidney. No ureteral stone is  identified. The urinary bladder is nondistended. Mild urinary bladder  wall thickening and patchy perivesical fat stranding, which could be  accentuated by under distention. Status post hysterectomy. The adnexa  are not definitively identified.     Mild diffuse gastric wall thickening could be accentuated by under  distention but could also reflect a nonspecific gastritis. Short segment  of distal ileum in the upper right hemipelvis with mild bowel wall  thickening suggesting a nonspecific segmental enteritis. Mild colorectal  stool. Extensive colonic diverticula, without specific CT evidence for  acute diverticulitis. No bowel obstruction. The appendix is normal.     Trace perihepatic free fluid and trace free fluid in the pelvis. No free  intraperitoneal air. No pathologically enlarged lymph nodes in the  abdomen or pelvis. Moderate to severe calcified  atherosclerotic disease  in the abdominal aorta and its branches without aneurysm.     Mild diffuse anasarca. Diffuse osteopenia. Mild to moderate multilevel  lumbar spondylosis with mild likely chronic/degenerative grade 1  anterolisthesis of L3 on L4 and L4 on L5. Partially imaged right MARQUEZ  with associated hardware artifact. No evidence of hardware  complications. Mild to moderate left hip joint DJD. No acute osseous  abnormality or concerning osseous lesion. The upper extremities are  partially included in the field-of-view but are not adequately  evaluated.       Impression:         1. Large 7 mm x 21 mm stone in the left renal pelvis with mild left  pelviectasis and mild left peripelvic and perinephric fat stranding,  suggesting possible intermediate obstruction and possible urinary tract  infection. Additional nonobstructing stones in the left kidney.  2. Urinary bladder wall thickening and mild perivesical fat stranding,  which could be accentuated by underdistention but could reflect a  nonspecific cystitis. Recommend correlation with urinalysis.  3. Mild diffuse gastric wall thickening could be accentuated by  underdistention but could reflect a nonspecific gastritis.  4. Short segment of distal ileal wall thickening, suggesting a  nonspecific segmental enteritis.  5. Mildly heterogeneous hepatic attenuation with subtle nodular liver  contours, which could reflect underlying hepatocellular  disease/cirrhosis, with evidence of portal venous hypertension including  splenomegaly, recanalization of the periumbilical vein, and trace  ascites.  6. Partially imaged small left pleural effusion and trace right pleural  fluid with multifocal bibasilar subsegmental atelectasis and/are  scarring and patchy groundglass opacities in the dependent base of each  lower lobe but could reflect superimposed pneumonia.     This report was finalized on 7/1/2025 2:53 PM by Rodrigo Alejandre MD on  Workstation: [id number]          "      Pertinent Labs     Results from last 7 days   Lab Units 07/10/25  0553 07/09/25  0659 07/08/25  0759 07/07/25  0700   WBC 10*3/mm3 6.67 5.93 5.41 5.97   HEMOGLOBIN g/dL 8.3* 8.1* 8.1* 8.2*   PLATELETS 10*3/mm3 48* 43* 33*  33* 30*     Results from last 7 days   Lab Units 07/10/25  0553 07/09/25  0659 07/08/25  0955 07/07/25  0700   SODIUM mmol/L 144 142 144 145   POTASSIUM mmol/L 3.5 4.4 3.6 3.5   CHLORIDE mmol/L 111* 112* 112* 114*   CO2 mmol/L 25.2 21.0* 22.0 23.5   BUN mg/dL 15.0 14.0 15.0 21.0   CREATININE mg/dL 1.18* 1.15* 1.29* 1.42*   GLUCOSE mg/dL 83 80 110* 117*   Estimated Creatinine Clearance: 26.5 mL/min (A) (by C-G formula based on SCr of 1.18 mg/dL (H)).  Results from last 7 days   Lab Units 07/10/25  0553 07/09/25  0659 07/08/25  0955 07/05/25  0441   ALBUMIN g/dL 2.3* 2.2* 2.1* 2.1*     Results from last 7 days   Lab Units 07/10/25  0553 07/09/25  0659 07/08/25  0955 07/07/25  0700 07/06/25  0618 07/05/25  0441   CALCIUM mg/dL 7.8* 7.6* 7.3* 7.6*   < > 7.3*   ALBUMIN g/dL 2.3* 2.2* 2.1*  --   --  2.1*   PHOSPHORUS mg/dL 2.4* 2.5 2.3*  --   --  3.5    < > = values in this interval not displayed.               Invalid input(s): \"LDLCALC\"        Test Results Pending at Discharge       Discharge Details        Discharge Medications        New Medications        Instructions Start Date   ferrous sulfate 325 (65 FE) MG tablet   325 mg, Oral, Every Other Day   Start Date: July 11, 2025     folic acid 1 MG tablet  Commonly known as: FOLVITE   1 mg, Oral, Daily   Start Date: July 11, 2025            Continue These Medications        Instructions Start Date   carbidopa-levodopa  MG per tablet  Commonly known as: SINEMET   1 tablet, 2 Times Daily      levothyroxine 75 MCG tablet  Commonly known as: SYNTHROID, LEVOTHROID   75 mcg, Every Early Morning      metoprolol tartrate 25 MG tablet  Commonly known as: LOPRESSOR   12.5 mg, 2 Times Daily      vitamin D3 125 MCG (5000 UT) capsule capsule   5,000 " Units, Daily             Stop These Medications      rosuvastatin 40 MG tablet  Commonly known as: CRESTOR     solifenacin 5 MG tablet  Commonly known as: VESICARE              Allergies   Allergen Reactions    Nitrofurantoin Hives    Sulfa Antibiotics Hives         Discharge Disposition:  Skilled Nursing Facility (DC - External)    Discharge Diet:  Diet Order   Procedures    Diet: Regular/House; Fluid Consistency: Thin (IDDSI 0)       Discharge Activity:       CODE STATUS:    Code Status and Medical Interventions: No CPR (Do Not Attempt to Resuscitate); Limited Support; No intubation (DNI), No cardioversion, No artificial nutrition   Ordered at: 07/08/25 1623     Code Status (Patient has no pulse and is not breathing):    No CPR (Do Not Attempt to Resuscitate)     Medical Interventions (Patient has pulse or is breathing):    Limited Support     Medical Intervention Limits:    No intubation (DNI)       No cardioversion       No artificial nutrition       No future appointments.   Contact information for follow-up providers       Bety Mccloud APRN .    Specialty: Nurse Practitioner  Contact information:  [address] [facility] [address]  [facility] [address] [phone]                       Contact information for after-discharge care       Destination       [facility] .    Service: Skilled Nursing  Contact information:  [address] [facility] [address] [phone]                                   Time Spent on Discharge:  Greater than 30 minutes      Trace Erickson MD  [facility]  07/10/25  15:20 EDT

## 2025-07-10 NOTE — PROGRESS NOTES
"RENAL/KCC:     LOS: 9 days   Patient Care Team:  Bety Mccloud APRN as PCP - General (Nurse Practitioner)    Chief Complaint: FAMILIA, obstructive uropathy       History of Present Illness  Agueda Krishna is a 89 y.o. female with h/o ckd III followed by Dr. JAQUI Lang. Admitted with pyelonephritis, familai, and urinary obstruction.  S/p cystoscopy with stent placement.       Subjective  7/7: No acute events.   UOP adequate  Resting in bed denies complaints.  Good oral intake  No nausea vomiting shortness of breath or chest pain    7/8: No complaints other than worsening edema bilateral upper and lower extremities  Diet restarted, no dyspnea    7/9: Main complaint continues to be lower extremity edema.  No shortness of breath chest pain fevers or chills    7/10: Resting comfortably with no complaints, discharge planned    History taken from: patient chart    Objective     Vital Sign Min/Max for last 24 hours  Temp  Min: 97.5 °F (36.4 °C)  Max: 97.9 °F (36.6 °C)   BP  Min: 136/62  Max: 151/59   Pulse  Min: 54  Max: 66   Resp  Min: 16  Max: 16   SpO2  Min: 97 %  Max: 97 %   No data recorded   No data recorded     Flowsheet Rows      Flowsheet Row First Filed Value   Admission Height 167.6 cm (66\") Documented at 07/01/2025 1234   Admission Weight 70.3 kg (155 lb) Documented at 07/01/2025 1234            I/O this shift:  In: 360 [P.O.:360]  Out: 1100 [Urine:1100]  I/O last 3 completed shifts:  In: 190 [P.O.:90; IV Piggyback:100]  Out: 2000 [Urine:2000]    Objective:  Vital signs: (most recent): Blood pressure 136/62, pulse 58, temperature 97.6 °F (36.4 °C), temperature source Oral, resp. rate 16, height 167.6 cm (66\"), weight 72.4 kg (159 lb 9.8 oz), SpO2 97%, not currently breastfeeding.            Physical Examination: General appearance -no acute distress, chronically ill-appearing  Chest - clear to auscultation, no wheezes, rales or rhonchi, symmetric air entry  Heart - normal rate, regular rhythm, normal S1, S2, no murmurs, " "rubs, clicks or gallops  Abdomen - soft, nontender, nondistended, no masses or organomegaly  Extremities -2+ edema bilaterally    Results Review:     I reviewed the patient's new clinical results.    WBC WBC   Date Value Ref Range Status   07/10/2025 6.67 3.40 - 10.80 10*3/mm3 Final   07/09/2025 5.93 3.40 - 10.80 10*3/mm3 Final   07/08/2025 5.41 3.40 - 10.80 10*3/mm3 Final      HGB Hemoglobin   Date Value Ref Range Status   07/10/2025 8.3 (L) 12.0 - 15.9 g/dL Final   07/09/2025 8.1 (L) 12.0 - 15.9 g/dL Final   07/08/2025 8.1 (L) 12.0 - 15.9 g/dL Final      HCT Hematocrit   Date Value Ref Range Status   07/10/2025 27.2 (L) 34.0 - 46.6 % Final   07/09/2025 25.0 (L) 34.0 - 46.6 % Final   07/08/2025 25.9 (L) 34.0 - 46.6 % Final      Platlets No results found for: \"LABPLAT\"   MCV MCV   Date Value Ref Range Status   07/10/2025 86.6 79.0 - 97.0 fL Final   07/09/2025 85.6 79.0 - 97.0 fL Final   07/08/2025 87.2 79.0 - 97.0 fL Final          Sodium Sodium   Date Value Ref Range Status   07/10/2025 144 136 - 145 mmol/L Final   07/09/2025 142 136 - 145 mmol/L Final   07/08/2025 144 136 - 145 mmol/L Final      Potassium Potassium   Date Value Ref Range Status   07/10/2025 3.5 3.5 - 5.2 mmol/L Final   07/09/2025 4.4 3.5 - 5.2 mmol/L Final     Comment:     Slight hemolysis detected by analyzer. Result may be falsely elevated.   07/08/2025 3.6 3.5 - 5.2 mmol/L Final      Chloride Chloride   Date Value Ref Range Status   07/10/2025 111 (H) 98 - 107 mmol/L Final   07/09/2025 112 (H) 98 - 107 mmol/L Final   07/08/2025 112 (H) 98 - 107 mmol/L Final      CO2 CO2   Date Value Ref Range Status   07/10/2025 25.2 22.0 - 29.0 mmol/L Final   07/09/2025 21.0 (L) 22.0 - 29.0 mmol/L Final   07/08/2025 22.0 22.0 - 29.0 mmol/L Final      BUN BUN   Date Value Ref Range Status   07/10/2025 15.0 8.0 - 23.0 mg/dL Final   07/09/2025 14.0 8.0 - 23.0 mg/dL Final   07/08/2025 15.0 8.0 - 23.0 mg/dL Final      Creatinine Creatinine   Date Value Ref Range " "Status   07/10/2025 1.18 (H) 0.57 - 1.00 mg/dL Final   07/09/2025 1.15 (H) 0.57 - 1.00 mg/dL Final   07/08/2025 1.29 (H) 0.57 - 1.00 mg/dL Final      Calcium Calcium   Date Value Ref Range Status   07/10/2025 7.8 (L) 8.2 - 9.6 mg/dL Final   07/09/2025 7.6 (L) 8.2 - 9.6 mg/dL Final   07/08/2025 7.3 (L) 8.2 - 9.6 mg/dL Final      PO4 No results found for: \"CAPO4\"   Albumin Albumin   Date Value Ref Range Status   07/10/2025 2.3 (L) 3.5 - 5.2 g/dL Final   07/09/2025 2.2 (L) 3.5 - 5.2 g/dL Final   07/08/2025 2.1 (L) 3.5 - 5.2 g/dL Final      Magnesium No results found for: \"MG\"     Uric Acid No results found for: \"URICACID\"     Medication Review:     Current Facility-Administered Medications:     acetaminophen (TYLENOL) tablet 650 mg, 650 mg, Oral, Q4H PRN, 650 mg at 07/08/25 1705 **OR** acetaminophen (TYLENOL) 160 MG/5ML oral solution 650 mg, 650 mg, Oral, Q4H PRN **OR** acetaminophen (TYLENOL) suppository 650 mg, 650 mg, Rectal, Q4H PRN, Joel Beebe MD    carbidopa-levodopa (SINEMET)  MG per tablet 1 tablet, 1 tablet, Oral, BID, Joel Beebe MD, 1 tablet at 07/10/25 0909    cholecalciferol (VITAMIN D3) tablet 5,000 Units, 5,000 Units, Oral, Daily, Joel Beebe MD, 5,000 Units at 07/10/25 0909    ferrous sulfate tablet 325 mg, 325 mg, Oral, Every Other Day, Ernesto Gilmore Jr., MD, 325 mg at 07/09/25 1023    folic acid (FOLVITE) tablet 1 mg, 1 mg, Oral, Daily, Alexys Castano MD PhD, 1 mg at 07/10/25 0909    hydrocortisone-bacitracin-zinc oxide-nystatin (MAGIC BARRIER) ointment 1 Application, 1 Application, Topical, BID, Trace Erickson MD, 1 Application at 07/10/25 0910    levothyroxine (SYNTHROID, LEVOTHROID) tablet 75 mcg, 75 mcg, Oral, Q AM, Joel Beebe MD, 75 mcg at 07/10/25 0629    metoprolol tartrate (LOPRESSOR) tablet 12.5 mg, 12.5 mg, Oral, BID, David Sandoval MD, 12.5 mg at 07/10/25 0909    nitroglycerin (NITROSTAT) SL tablet 0.4 mg, 0.4 mg, Sublingual, Q5 Min PRN, Joel Beebe " MD RENATA    ondansetron ODT (ZOFRAN-ODT) disintegrating tablet 4 mg, 4 mg, Oral, Q6H PRN **OR** ondansetron (ZOFRAN) injection 4 mg, 4 mg, Intravenous, Q6H PRN, Joel Beebe MD    [COMPLETED] Insert Peripheral IV, , , Once **AND** sodium chloride 0.9 % flush 10 mL, 10 mL, Intravenous, PRN, Joel Beebe MD    sodium chloride 0.9 % flush 10 mL, 10 mL, Intravenous, Q12H, Joel Beebe MD, 10 mL at 07/10/25 0909    sodium chloride 0.9 % flush 10 mL, 10 mL, Intravenous, PRN, Joel Beebe MD    sodium chloride 0.9 % infusion 40 mL, 40 mL, Intravenous, PRN, Joel Beebe MD      Assessment & Plan       E. coli UTI (urinary tract infection)    Hypovolemic shock    Acute kidney injury    Left ureteral calculus    Severe sepsis with thrombocytopenia    Folate deficiency    Splenomegaly, congestive, chronic    Cirrhosis of liver with ascites    Iron deficiency anemia      Assessment & Plan  FAMILIA, improving  CKD, stage IIIa (baseline around 1.2)  Obstructive uropathy s/p stent placement.   Thrombocytopenia   Metabolic acidosis   Hypernatremia, Stable      Plan:  Renal function continues to slowly improve, creatinine today remains near baseline  volume overloaded looks a little better today after IV Lasix  Encourage nutrition and hydration  Will follow.  Discussed with family at bedside  Discharge planning underway, stable for discharge anytime from a renal standpoint      Bar Lang MD  [facility]  07/10/25  16:33 EDT

## 2025-07-10 NOTE — PLAN OF CARE
Goal Outcome Evaluation:  Plan of Care Reviewed With: patient        Progress: improving  Outcome Evaluation: Patient  resting  in  bed.  Denies  pain.   Up  to  Bedside  commode   with  one  assist.     Upper  and  lower  extremity  remain  swollen.  Left a rm  wrapped  witrh  Ace bandage.   Voiding  well  post lasix.    Room  air.   SR/SB  on  the  monitor. Possible  discharge  today  pending  bed  availabilty.  Nursing  will  continue  to monitor.

## 2025-07-11 NOTE — CASE MANAGEMENT/SOCIAL WORK
Case Management Discharge Note      Final Note: [facility] via son transport         Selected Continued Care - Discharged on 7/10/2025 Admission date: 7/1/2025 - Discharge disposition: Skilled Nursing Facility (DC - External)      Destination Coordination complete.      Service Provider Services Address Phone Fax Patient Preferred    [facility] [address] [id number] [phone] --              Durable Medical Equipment    No services have been selected for the patient.                Dialysis/Infusion    No services have been selected for the patient.                Home Medical Care    No services have been selected for the patient.                Therapy    No services have been selected for the patient.                Community Resources    No services have been selected for the patient.                Community & DME    No services have been selected for the patient.                    Transportation Services  Transportation: Private Transportation  Private: Car    Final Discharge Disposition Code: 03 - skilled nursing facility (SNF)

## (undated) DEVICE — NITINOL WIRE WITH HYDROPHILIC TIP: Brand: SENSOR

## (undated) DEVICE — TIDISHIELD UROLOGY DRAIN BAGS FROSTY VINYL STERILE FITS SIEMENS UROSKOP ACCESS 20 PER CASE: Brand: TIDISHIELD

## (undated) DEVICE — GLV SURG BIOGEL LTX PF 8

## (undated) DEVICE — CATH URETRL FLXITP POLLACK STD 5F 70CM

## (undated) DEVICE — SYR LUERLOK 20CC BX/50

## (undated) DEVICE — URETERAL STENT
Type: IMPLANTABLE DEVICE | Site: URETER | Status: NON-FUNCTIONAL
Brand: CONTOUR™
Removed: 2025-07-01

## (undated) DEVICE — LOU CYSTO: Brand: MEDLINE INDUSTRIES, INC.